# Patient Record
Sex: MALE | Race: OTHER | HISPANIC OR LATINO | URBAN - METROPOLITAN AREA
[De-identification: names, ages, dates, MRNs, and addresses within clinical notes are randomized per-mention and may not be internally consistent; named-entity substitution may affect disease eponyms.]

---

## 2024-04-16 ENCOUNTER — INPATIENT HOSPITAL (OUTPATIENT)
Dept: URBAN - METROPOLITAN AREA HOSPITAL 84 | Facility: HOSPITAL | Age: 34
End: 2024-04-16
Payer: MEDICAID

## 2024-04-16 ENCOUNTER — HOSPITAL ENCOUNTER (INPATIENT)
Facility: HOSPITAL | Age: 34
LOS: 6 days | Discharge: HOME | End: 2024-04-22
Attending: STUDENT IN AN ORGANIZED HEALTH CARE EDUCATION/TRAINING PROGRAM | Admitting: INTERNAL MEDICINE
Payer: MEDICAID

## 2024-04-16 ENCOUNTER — APPOINTMENT (OUTPATIENT)
Dept: GENERAL RADIOLOGY | Facility: HOSPITAL | Age: 34
End: 2024-04-16
Payer: MEDICAID

## 2024-04-16 ENCOUNTER — APPOINTMENT (OUTPATIENT)
Dept: CT IMAGING | Facility: HOSPITAL | Age: 34
End: 2024-04-16
Payer: MEDICAID

## 2024-04-16 DIAGNOSIS — F10.10 ETOH ABUSE: Primary | ICD-10-CM

## 2024-04-16 DIAGNOSIS — D72.829 ELEVATED WHITE BLOOD CELL COUNT, UNSPECIFIED: ICD-10-CM

## 2024-04-16 DIAGNOSIS — D53.9 NUTRITIONAL ANEMIA, UNSPECIFIED: ICD-10-CM

## 2024-04-16 DIAGNOSIS — R74.01 ELEVATION OF LEVELS OF LIVER TRANSAMINASE LEVELS: ICD-10-CM

## 2024-04-16 DIAGNOSIS — K72.00 ACUTE AND SUBACUTE HEPATIC FAILURE WITHOUT COMA: ICD-10-CM

## 2024-04-16 DIAGNOSIS — E87.8 OTHER DISORDERS OF ELECTROLYTE AND FLUID BALANCE, NOT ELSEWH: ICD-10-CM

## 2024-04-16 DIAGNOSIS — R17 UNSPECIFIED JAUNDICE: ICD-10-CM

## 2024-04-16 DIAGNOSIS — F10.10 ALCOHOL ABUSE, UNCOMPLICATED: ICD-10-CM

## 2024-04-16 LAB
ALBUMIN SERPL-MCNC: 2.6 G/DL (ref 3.5–5.2)
ALBUMIN SERPL-MCNC: 3.3 G/DL (ref 3.5–5.2)
ALBUMIN/GLOB SERPL: 0.6 G/DL
ALBUMIN/GLOB SERPL: 0.7 G/DL
ALP SERPL-CCNC: 190 U/L (ref 39–117)
ALP SERPL-CCNC: 268 U/L (ref 39–117)
ALPHA1 GLOB MFR UR ELPH: 252 MG/DL (ref 90–200)
ALT SERPL W P-5'-P-CCNC: 52 U/L (ref 1–41)
ALT SERPL W P-5'-P-CCNC: 65 U/L (ref 1–41)
AMMONIA BLD-SCNC: 92 UMOL/L (ref 16–60)
AMMONIA BLD-SCNC: 99 UMOL/L (ref 16–60)
ANION GAP SERPL CALCULATED.3IONS-SCNC: 15 MMOL/L (ref 5–15)
ANION GAP SERPL CALCULATED.3IONS-SCNC: 15 MMOL/L (ref 5–15)
AST SERPL-CCNC: 150 U/L (ref 1–40)
AST SERPL-CCNC: 203 U/L (ref 1–40)
BACTERIA UR QL AUTO: ABNORMAL /HPF
BASOPHILS # BLD AUTO: 0.04 10*3/MM3 (ref 0–0.2)
BASOPHILS # BLD AUTO: 0.08 10*3/MM3 (ref 0–0.2)
BASOPHILS NFR BLD AUTO: 0.2 % (ref 0–1.5)
BASOPHILS NFR BLD AUTO: 0.4 % (ref 0–1.5)
BILIRUB SERPL-MCNC: 25 MG/DL (ref 0–1.2)
BILIRUB SERPL-MCNC: 30.2 MG/DL (ref 0–1.2)
BILIRUB UR QL STRIP: ABNORMAL
BUN SERPL-MCNC: 3 MG/DL (ref 6–20)
BUN SERPL-MCNC: 3 MG/DL (ref 6–20)
BUN/CREAT SERPL: 6.1 (ref 7–25)
BUN/CREAT SERPL: 8.8 (ref 7–25)
CALCIUM SPEC-SCNC: 8 MG/DL (ref 8.6–10.5)
CALCIUM SPEC-SCNC: 9.1 MG/DL (ref 8.6–10.5)
CERULOPLASMIN SERPL-MCNC: 33 MG/DL (ref 16–31)
CHLORIDE SERPL-SCNC: 94 MMOL/L (ref 98–107)
CHLORIDE SERPL-SCNC: 99 MMOL/L (ref 98–107)
CLARITY UR: CLEAR
CO2 SERPL-SCNC: 20 MMOL/L (ref 22–29)
CO2 SERPL-SCNC: 22 MMOL/L (ref 22–29)
COLOR UR: ABNORMAL
CREAT SERPL-MCNC: 0.34 MG/DL (ref 0.76–1.27)
CREAT SERPL-MCNC: 0.49 MG/DL (ref 0.76–1.27)
DEPRECATED RDW RBC AUTO: 60.1 FL (ref 37–54)
DEPRECATED RDW RBC AUTO: 60.4 FL (ref 37–54)
EGFRCR SERPLBLD CKD-EPI 2021: 139.8 ML/MIN/1.73
EGFRCR SERPLBLD CKD-EPI 2021: 156.1 ML/MIN/1.73
EOSINOPHIL # BLD AUTO: 0.11 10*3/MM3 (ref 0–0.4)
EOSINOPHIL # BLD AUTO: 0.29 10*3/MM3 (ref 0–0.4)
EOSINOPHIL NFR BLD AUTO: 0.6 % (ref 0.3–6.2)
EOSINOPHIL NFR BLD AUTO: 1.3 % (ref 0.3–6.2)
ERYTHROCYTE [DISTWIDTH] IN BLOOD BY AUTOMATED COUNT: 16.9 % (ref 12.3–15.4)
ERYTHROCYTE [DISTWIDTH] IN BLOOD BY AUTOMATED COUNT: 17.1 % (ref 12.3–15.4)
FERRITIN SERPL-MCNC: 1801 NG/ML (ref 30–400)
FOLATE SERPL-MCNC: 12.4 NG/ML (ref 4.78–24.2)
GLOBULIN UR ELPH-MCNC: 4.5 GM/DL
GLOBULIN UR ELPH-MCNC: 4.9 GM/DL
GLUCOSE SERPL-MCNC: 109 MG/DL (ref 65–99)
GLUCOSE SERPL-MCNC: 185 MG/DL (ref 65–99)
GLUCOSE UR STRIP-MCNC: NEGATIVE MG/DL
HAV IGM SERPL QL IA: NORMAL
HAV IGM SERPL QL IA: NORMAL
HBV CORE IGM SERPL QL IA: NORMAL
HBV CORE IGM SERPL QL IA: NORMAL
HBV SURFACE AG SERPL QL IA: NORMAL
HBV SURFACE AG SERPL QL IA: NORMAL
HCT VFR BLD AUTO: 29.8 % (ref 37.5–51)
HCT VFR BLD AUTO: 32.3 % (ref 37.5–51)
HCV AB SER DONR QL: NORMAL
HCV AB SER DONR QL: NORMAL
HGB BLD-MCNC: 10.8 G/DL (ref 13–17.7)
HGB BLD-MCNC: 11.6 G/DL (ref 13–17.7)
HGB UR QL STRIP.AUTO: ABNORMAL
HYALINE CASTS UR QL AUTO: ABNORMAL /LPF
IMM GRANULOCYTES # BLD AUTO: 0.25 10*3/MM3 (ref 0–0.05)
IMM GRANULOCYTES # BLD AUTO: 0.29 10*3/MM3 (ref 0–0.05)
IMM GRANULOCYTES NFR BLD AUTO: 1.3 % (ref 0–0.5)
IMM GRANULOCYTES NFR BLD AUTO: 1.4 % (ref 0–0.5)
INR PPP: 1.62 (ref 0.93–1.1)
IRON 24H UR-MRATE: 94 MCG/DL (ref 59–158)
KETONES UR QL STRIP: NEGATIVE
LEUKOCYTE ESTERASE UR QL STRIP.AUTO: ABNORMAL
LYMPHOCYTES # BLD AUTO: 0.55 10*3/MM3 (ref 0.7–3.1)
LYMPHOCYTES # BLD AUTO: 1.8 10*3/MM3 (ref 0.7–3.1)
LYMPHOCYTES NFR BLD AUTO: 3 % (ref 19.6–45.3)
LYMPHOCYTES NFR BLD AUTO: 7.9 % (ref 19.6–45.3)
MAGNESIUM SERPL-MCNC: 2 MG/DL (ref 1.6–2.6)
MAGNESIUM SERPL-MCNC: 2 MG/DL (ref 1.6–2.6)
MCH RBC QN AUTO: 35.3 PG (ref 26.6–33)
MCH RBC QN AUTO: 35.5 PG (ref 26.6–33)
MCHC RBC AUTO-ENTMCNC: 35.9 G/DL (ref 31.5–35.7)
MCHC RBC AUTO-ENTMCNC: 36.2 G/DL (ref 31.5–35.7)
MCV RBC AUTO: 98 FL (ref 79–97)
MCV RBC AUTO: 98.2 FL (ref 79–97)
MONOCYTES # BLD AUTO: 0.8 10*3/MM3 (ref 0.1–0.9)
MONOCYTES # BLD AUTO: 1.61 10*3/MM3 (ref 0.1–0.9)
MONOCYTES NFR BLD AUTO: 4.3 % (ref 5–12)
MONOCYTES NFR BLD AUTO: 7.1 % (ref 5–12)
NEUTROPHILS NFR BLD AUTO: 16.71 10*3/MM3 (ref 1.7–7)
NEUTROPHILS NFR BLD AUTO: 18.59 10*3/MM3 (ref 1.7–7)
NEUTROPHILS NFR BLD AUTO: 82 % (ref 42.7–76)
NEUTROPHILS NFR BLD AUTO: 90.5 % (ref 42.7–76)
NITRITE UR QL STRIP: POSITIVE
NRBC BLD AUTO-RTO: 0.2 /100 WBC (ref 0–0.2)
NRBC BLD AUTO-RTO: 0.2 /100 WBC (ref 0–0.2)
PH UR STRIP.AUTO: 7.5 [PH] (ref 5–8)
PHOSPHATE SERPL-MCNC: 1.8 MG/DL (ref 2.5–4.5)
PHOSPHATE SERPL-MCNC: 2 MG/DL (ref 2.5–4.5)
PHOSPHATE SERPL-MCNC: 2.1 MG/DL (ref 2.5–4.5)
PLATELET # BLD AUTO: 211 10*3/MM3 (ref 140–450)
PLATELET # BLD AUTO: 225 10*3/MM3 (ref 140–450)
PMV BLD AUTO: 10.5 FL (ref 6–12)
PMV BLD AUTO: 10.8 FL (ref 6–12)
POTASSIUM SERPL-SCNC: 2.9 MMOL/L (ref 3.5–5.2)
POTASSIUM SERPL-SCNC: 3.1 MMOL/L (ref 3.5–5.2)
PROT SERPL-MCNC: 7.1 G/DL (ref 6–8.5)
PROT SERPL-MCNC: 8.2 G/DL (ref 6–8.5)
PROT UR QL STRIP: NEGATIVE
PROTHROMBIN TIME: 17 SECONDS (ref 9.6–11.7)
RBC # BLD AUTO: 3.04 10*6/MM3 (ref 4.14–5.8)
RBC # BLD AUTO: 3.29 10*6/MM3 (ref 4.14–5.8)
RBC # UR STRIP: ABNORMAL /HPF
REF LAB TEST METHOD: ABNORMAL
SODIUM SERPL-SCNC: 131 MMOL/L (ref 136–145)
SODIUM SERPL-SCNC: 134 MMOL/L (ref 136–145)
SP GR UR STRIP: 1.01 (ref 1–1.03)
SQUAMOUS #/AREA URNS HPF: ABNORMAL /HPF
UROBILINOGEN UR QL STRIP: ABNORMAL
VIT B12 BLD-MCNC: 968 PG/ML (ref 211–946)
WBC # UR STRIP: ABNORMAL /HPF
WBC NRBC COR # BLD AUTO: 18.46 10*3/MM3 (ref 3.4–10.8)
WBC NRBC COR # BLD AUTO: 22.66 10*3/MM3 (ref 3.4–10.8)

## 2024-04-16 PROCEDURE — 85025 COMPLETE CBC W/AUTO DIFF WBC: CPT | Performed by: NURSE PRACTITIONER

## 2024-04-16 PROCEDURE — 71046 X-RAY EXAM CHEST 2 VIEWS: CPT

## 2024-04-16 PROCEDURE — 99222 1ST HOSP IP/OBS MODERATE 55: CPT | Performed by: NURSE PRACTITIONER

## 2024-04-16 PROCEDURE — 86038 ANTINUCLEAR ANTIBODIES: CPT | Performed by: NURSE PRACTITIONER

## 2024-04-16 PROCEDURE — 85610 PROTHROMBIN TIME: CPT | Performed by: NURSE PRACTITIONER

## 2024-04-16 PROCEDURE — 82103 ALPHA-1-ANTITRYPSIN TOTAL: CPT | Performed by: NURSE PRACTITIONER

## 2024-04-16 PROCEDURE — 82607 VITAMIN B-12: CPT | Performed by: NURSE PRACTITIONER

## 2024-04-16 PROCEDURE — 25010000002 THIAMINE HCL 200 MG/2ML SOLUTION: Performed by: NURSE PRACTITIONER

## 2024-04-16 PROCEDURE — 83540 ASSAY OF IRON: CPT | Performed by: NURSE PRACTITIONER

## 2024-04-16 PROCEDURE — 25010000002 ACETYLCYSTEINE PER 100 MG: Performed by: NURSE PRACTITIONER

## 2024-04-16 PROCEDURE — 86015 ACTIN ANTIBODY EACH: CPT | Performed by: NURSE PRACTITIONER

## 2024-04-16 PROCEDURE — 83735 ASSAY OF MAGNESIUM: CPT | Performed by: NURSE PRACTITIONER

## 2024-04-16 PROCEDURE — 81001 URINALYSIS AUTO W/SCOPE: CPT | Performed by: NURSE PRACTITIONER

## 2024-04-16 PROCEDURE — 82746 ASSAY OF FOLIC ACID SERUM: CPT | Performed by: NURSE PRACTITIONER

## 2024-04-16 PROCEDURE — 86381 MITOCHONDRIAL ANTIBODY EACH: CPT | Performed by: NURSE PRACTITIONER

## 2024-04-16 PROCEDURE — 80053 COMPREHEN METABOLIC PANEL: CPT | Performed by: NURSE PRACTITIONER

## 2024-04-16 PROCEDURE — 63710000001 METHYLPREDNISOLONE PER 4 MG: Performed by: INTERNAL MEDICINE

## 2024-04-16 PROCEDURE — 82140 ASSAY OF AMMONIA: CPT | Performed by: NURSE PRACTITIONER

## 2024-04-16 PROCEDURE — 82728 ASSAY OF FERRITIN: CPT | Performed by: NURSE PRACTITIONER

## 2024-04-16 PROCEDURE — 0 DEXTROSE 5 % SOLUTION 250 ML FLEX CONT: Performed by: NURSE PRACTITIONER

## 2024-04-16 PROCEDURE — 86376 MICROSOMAL ANTIBODY EACH: CPT | Performed by: NURSE PRACTITIONER

## 2024-04-16 PROCEDURE — 82390 ASSAY OF CERULOPLASMIN: CPT | Performed by: NURSE PRACTITIONER

## 2024-04-16 PROCEDURE — 74176 CT ABD & PELVIS W/O CONTRAST: CPT

## 2024-04-16 PROCEDURE — 0 DEXTROSE 5 % SOLUTION 500 ML FLEX CONT: Performed by: NURSE PRACTITIONER

## 2024-04-16 PROCEDURE — 80074 ACUTE HEPATITIS PANEL: CPT | Performed by: NURSE PRACTITIONER

## 2024-04-16 PROCEDURE — 25810000003 SODIUM CHLORIDE 0.9 % SOLUTION: Performed by: NURSE PRACTITIONER

## 2024-04-16 PROCEDURE — 0 DEXTROSE 5 % SOLUTION 1,000 ML FLEX CONT: Performed by: NURSE PRACTITIONER

## 2024-04-16 PROCEDURE — 84100 ASSAY OF PHOSPHORUS: CPT | Performed by: INTERNAL MEDICINE

## 2024-04-16 PROCEDURE — 84100 ASSAY OF PHOSPHORUS: CPT | Performed by: NURSE PRACTITIONER

## 2024-04-16 PROCEDURE — 25010000002 CEFTRIAXONE PER 250 MG: Performed by: NURSE PRACTITIONER

## 2024-04-16 RX ORDER — POLYETHYLENE GLYCOL 3350 17 G/17G
17 POWDER, FOR SOLUTION ORAL DAILY PRN
Status: DISCONTINUED | OUTPATIENT
Start: 2024-04-16 | End: 2024-04-22 | Stop reason: HOSPADM

## 2024-04-16 RX ORDER — POTASSIUM CHLORIDE 20 MEQ/1
40 TABLET, EXTENDED RELEASE ORAL EVERY 4 HOURS
Qty: 6 TABLET | Refills: 0 | Status: COMPLETED | OUTPATIENT
Start: 2024-04-16 | End: 2024-04-17

## 2024-04-16 RX ORDER — SODIUM CHLORIDE 0.9 % (FLUSH) 0.9 %
10 SYRINGE (ML) INJECTION EVERY 12 HOURS SCHEDULED
Status: DISCONTINUED | OUTPATIENT
Start: 2024-04-16 | End: 2024-04-22 | Stop reason: HOSPADM

## 2024-04-16 RX ORDER — FENTANYL/ROPIVACAINE/NS/PF 2-625MCG/1
15 PLASTIC BAG, INJECTION (ML) EPIDURAL
Qty: 500 ML | Refills: 0 | Status: COMPLETED | OUTPATIENT
Start: 2024-04-16 | End: 2024-04-16

## 2024-04-16 RX ORDER — SODIUM CHLORIDE 9 MG/ML
40 INJECTION, SOLUTION INTRAVENOUS AS NEEDED
Status: DISCONTINUED | OUTPATIENT
Start: 2024-04-16 | End: 2024-04-22 | Stop reason: HOSPADM

## 2024-04-16 RX ORDER — POTASSIUM CHLORIDE 20 MEQ/1
40 TABLET, EXTENDED RELEASE ORAL ONCE
Status: COMPLETED | OUTPATIENT
Start: 2024-04-16 | End: 2024-04-16

## 2024-04-16 RX ORDER — BISACODYL 5 MG/1
5 TABLET, DELAYED RELEASE ORAL DAILY PRN
Status: DISCONTINUED | OUTPATIENT
Start: 2024-04-16 | End: 2024-04-22 | Stop reason: HOSPADM

## 2024-04-16 RX ORDER — ZINC SULFATE 50(220)MG
220 CAPSULE ORAL DAILY
Status: DISCONTINUED | OUTPATIENT
Start: 2024-04-16 | End: 2024-04-22 | Stop reason: HOSPADM

## 2024-04-16 RX ORDER — LORAZEPAM 2 MG/ML
2 INJECTION INTRAMUSCULAR
Status: ACTIVE | OUTPATIENT
Start: 2024-04-16 | End: 2024-04-21

## 2024-04-16 RX ORDER — AMOXICILLIN 250 MG
2 CAPSULE ORAL 2 TIMES DAILY PRN
Status: DISCONTINUED | OUTPATIENT
Start: 2024-04-16 | End: 2024-04-22 | Stop reason: HOSPADM

## 2024-04-16 RX ORDER — BISACODYL 10 MG
10 SUPPOSITORY, RECTAL RECTAL DAILY PRN
Status: DISCONTINUED | OUTPATIENT
Start: 2024-04-16 | End: 2024-04-22 | Stop reason: HOSPADM

## 2024-04-16 RX ORDER — ONDANSETRON 2 MG/ML
4 INJECTION INTRAMUSCULAR; INTRAVENOUS EVERY 6 HOURS PRN
Status: DISCONTINUED | OUTPATIENT
Start: 2024-04-16 | End: 2024-04-22 | Stop reason: HOSPADM

## 2024-04-16 RX ORDER — METHYLPREDNISOLONE 4 MG/1
40 TABLET ORAL
Status: DISCONTINUED | OUTPATIENT
Start: 2024-04-16 | End: 2024-04-18

## 2024-04-16 RX ORDER — DIPHENOXYLATE HYDROCHLORIDE AND ATROPINE SULFATE 2.5; .025 MG/1; MG/1
1 TABLET ORAL DAILY
Status: DISCONTINUED | OUTPATIENT
Start: 2024-04-16 | End: 2024-04-22 | Stop reason: HOSPADM

## 2024-04-16 RX ORDER — LORAZEPAM 1 MG/1
2 TABLET ORAL EVERY 6 HOURS
Qty: 8 TABLET | Refills: 0 | Status: COMPLETED | OUTPATIENT
Start: 2024-04-16 | End: 2024-04-16

## 2024-04-16 RX ORDER — THIAMINE HYDROCHLORIDE 100 MG/ML
200 INJECTION, SOLUTION INTRAMUSCULAR; INTRAVENOUS EVERY 8 HOURS SCHEDULED
Qty: 30 ML | Refills: 0 | Status: COMPLETED | OUTPATIENT
Start: 2024-04-16 | End: 2024-04-20

## 2024-04-16 RX ORDER — LACTULOSE 10 G/15ML
30 SOLUTION ORAL 3 TIMES DAILY
Status: DISCONTINUED | OUTPATIENT
Start: 2024-04-16 | End: 2024-04-17

## 2024-04-16 RX ORDER — LORAZEPAM 2 MG/ML
1 INJECTION INTRAMUSCULAR
Status: ACTIVE | OUTPATIENT
Start: 2024-04-16 | End: 2024-04-21

## 2024-04-16 RX ORDER — NITROGLYCERIN 0.4 MG/1
0.4 TABLET SUBLINGUAL
Status: DISCONTINUED | OUTPATIENT
Start: 2024-04-16 | End: 2024-04-22 | Stop reason: HOSPADM

## 2024-04-16 RX ORDER — LORAZEPAM 2 MG/ML
2 INJECTION INTRAMUSCULAR
Status: DISPENSED | OUTPATIENT
Start: 2024-04-16 | End: 2024-04-21

## 2024-04-16 RX ORDER — LACTULOSE 10 G/15ML
300 SOLUTION ORAL ONCE
Status: DISCONTINUED | OUTPATIENT
Start: 2024-04-16 | End: 2024-04-16

## 2024-04-16 RX ORDER — LORAZEPAM 1 MG/1
1 TABLET ORAL
Status: DISPENSED | OUTPATIENT
Start: 2024-04-16 | End: 2024-04-21

## 2024-04-16 RX ORDER — LORAZEPAM 1 MG/1
2 TABLET ORAL
Status: ACTIVE | OUTPATIENT
Start: 2024-04-16 | End: 2024-04-21

## 2024-04-16 RX ORDER — NICOTINE 21 MG/24HR
1 PATCH, TRANSDERMAL 24 HOURS TRANSDERMAL DAILY
Status: DISCONTINUED | OUTPATIENT
Start: 2024-04-16 | End: 2024-04-22 | Stop reason: HOSPADM

## 2024-04-16 RX ORDER — LORAZEPAM 1 MG/1
1 TABLET ORAL EVERY 6 HOURS
Qty: 4 TABLET | Refills: 0 | Status: DISPENSED | OUTPATIENT
Start: 2024-04-17 | End: 2024-04-18

## 2024-04-16 RX ORDER — FOLIC ACID 1 MG/1
1 TABLET ORAL DAILY
Status: DISCONTINUED | OUTPATIENT
Start: 2024-04-16 | End: 2024-04-22 | Stop reason: HOSPADM

## 2024-04-16 RX ORDER — SODIUM CHLORIDE 0.9 % (FLUSH) 0.9 %
10 SYRINGE (ML) INJECTION AS NEEDED
Status: DISCONTINUED | OUTPATIENT
Start: 2024-04-16 | End: 2024-04-22 | Stop reason: HOSPADM

## 2024-04-16 RX ADMIN — ACETYLCYSTEINE 11380 MG: 6 INJECTION, SOLUTION INTRAVENOUS at 08:44

## 2024-04-16 RX ADMIN — Medication 220 MG: at 10:08

## 2024-04-16 RX ADMIN — THIAMINE HYDROCHLORIDE 200 MG: 100 INJECTION, SOLUTION INTRAMUSCULAR; INTRAVENOUS at 14:31

## 2024-04-16 RX ADMIN — Medication 10 ML: at 21:24

## 2024-04-16 RX ADMIN — THERA TABS 1 TABLET: TAB at 10:08

## 2024-04-16 RX ADMIN — POTASSIUM CHLORIDE 40 MEQ: 1500 TABLET, EXTENDED RELEASE ORAL at 02:07

## 2024-04-16 RX ADMIN — THIAMINE HYDROCHLORIDE 200 MG: 100 INJECTION, SOLUTION INTRAMUSCULAR; INTRAVENOUS at 01:18

## 2024-04-16 RX ADMIN — POTASSIUM PHOSPHATE, MONOBASIC AND POTASSIUM PHOSPHATE, DIBASIC 15 MMOL: 224; 236 INJECTION, SOLUTION, CONCENTRATE INTRAVENOUS at 03:10

## 2024-04-16 RX ADMIN — POTASSIUM, SODIUM PHOSPHATES 280 MG-160 MG-250 MG ORAL POWDER PACKET 2 PACKET: POWDER IN PACKET at 15:42

## 2024-04-16 RX ADMIN — LORAZEPAM 2 MG: 1 TABLET ORAL at 21:23

## 2024-04-16 RX ADMIN — ACETYLCYSTEINE 6.25 MG/KG/HR: 200 INJECTION, SOLUTION INTRAVENOUS at 14:31

## 2024-04-16 RX ADMIN — RIFAXIMIN 550 MG: 550 TABLET ORAL at 21:23

## 2024-04-16 RX ADMIN — Medication 10 ML: at 01:19

## 2024-04-16 RX ADMIN — LACTULOSE 30 G: 20 SOLUTION ORAL at 02:34

## 2024-04-16 RX ADMIN — CEFTRIAXONE 2000 MG: 2 INJECTION, POWDER, FOR SOLUTION INTRAMUSCULAR; INTRAVENOUS at 02:34

## 2024-04-16 RX ADMIN — FOLIC ACID 1 MG: 1 TABLET ORAL at 08:10

## 2024-04-16 RX ADMIN — RIFAXIMIN 550 MG: 550 TABLET ORAL at 10:08

## 2024-04-16 RX ADMIN — Medication 10 ML: at 10:09

## 2024-04-16 RX ADMIN — Medication 1 PATCH: at 01:19

## 2024-04-16 RX ADMIN — THIAMINE HYDROCHLORIDE 200 MG: 100 INJECTION, SOLUTION INTRAMUSCULAR; INTRAVENOUS at 21:24

## 2024-04-16 RX ADMIN — DEXTROSE MONOHYDRATE 3800 MG: 50 INJECTION, SOLUTION INTRAVENOUS at 10:20

## 2024-04-16 RX ADMIN — POTASSIUM CHLORIDE 40 MEQ: 1500 TABLET, EXTENDED RELEASE ORAL at 21:23

## 2024-04-16 RX ADMIN — LACTULOSE 30 G: 20 SOLUTION ORAL at 15:42

## 2024-04-16 RX ADMIN — METHYLPREDNISOLONE 40 MG: 4 TABLET ORAL at 08:43

## 2024-04-16 RX ADMIN — LORAZEPAM 2 MG: 1 TABLET ORAL at 01:18

## 2024-04-16 RX ADMIN — LORAZEPAM 2 MG: 1 TABLET ORAL at 08:10

## 2024-04-16 RX ADMIN — POTASSIUM PHOSPHATE, MONOBASIC AND POTASSIUM PHOSPHATE, DIBASIC 15 MMOL: 224; 236 INJECTION, SOLUTION, CONCENTRATE INTRAVENOUS at 06:06

## 2024-04-16 RX ADMIN — LACTULOSE 30 G: 20 SOLUTION ORAL at 21:24

## 2024-04-16 RX ADMIN — LORAZEPAM 2 MG: 1 TABLET ORAL at 14:30

## 2024-04-16 RX ADMIN — RIFAXIMIN 550 MG: 550 TABLET ORAL at 02:34

## 2024-04-16 RX ADMIN — LACTULOSE 30 G: 20 SOLUTION ORAL at 08:44

## 2024-04-16 RX ADMIN — LORAZEPAM 1 MG: 1 TABLET ORAL at 22:43

## 2024-04-16 RX ADMIN — POTASSIUM CHLORIDE 40 MEQ: 1500 TABLET, EXTENDED RELEASE ORAL at 15:42

## 2024-04-16 NOTE — PLAN OF CARE
Goal Outcome Evaluation:      Pt resting in bed with no complaints of pain. Weakness in lower extremities noted by RN, walker provided as well as stand-by assistance when ambulating. Acetylcysteine currently infusing continuously, see MAR. Pt CIWA score throughout the day less than 2, scheduled Ativan given as ordered. Safety measures in place and call light within reach.

## 2024-04-16 NOTE — PROGRESS NOTES
[facility] MEDICINE SERVICE  DAILY PROGRESS NOTE    NAME: Duglas Candelaria  : 1990  MRN: [id number]      LOS: 0 days     PROVIDER OF SERVICE: Austen Montanez MD    Chief Complaint: Jaundice    Subjective:     Interval History: Patient feels well today, denies any significant withdrawal symptoms at this time.  He denies any nausea, vomiting or abdominal pain.    Review of Systems:   Review of Systems    Objective:     Vital Signs  Temp:  [97.8 °F (36.6 °C)-98.9 °F (37.2 °C)] 98.9 °F (37.2 °C)  Heart Rate:  [] 121  Resp:  [17-27] 27  BP: (115-132)/(66-73) 115/66   Body mass index is 27.01 kg/m².    Physical Exam  Physical Exam  General Appearance:  Alert, cooperative, no distress, appears stated age  Head:  Normocephalic, without obvious abnormality, atraumatic  Eyes:  PERRL, conjunctiva/corneas clear, EOM's intact, fundi benign, bilateral icteric sclera  Ears:  Normal TM's and external ear canals, both ears  Nose: Nares normal, septum midline, mucosa normal, no drainage or sinus tenderness  Throat: Lips, mucosa, and tongue normal; teeth and gums normal  Neck: Supple, symmetrical, trachea midline, no adenopathy, thyroid: not enlarged, symmetric, no tenderness/mass/nodules, no carotid bruit or JVD  Lungs:   Clear to auscultation bilaterally, respirations unlabored  Heart:  Regular rate and rhythm, S1, S2 normal, no murmur, rub or gallop  Abdomen:  Soft, non-tender, bowel sounds active all four quadrants,  no masses, no organomegaly  Extremities: Extremities normal, atraumatic, no cyanosis or edema  Pulses: 2+ and symmetric  Skin: Skin color, texture, turgor normal, no rashes or lesions, jaundiced  Neurologic: Normal      Scheduled Meds   acetylcysteine (ACETADOTE) 3,800 mg in dextrose (D5W) 5 % 500 mL infusion, 50 mg/kg, Intravenous, Once  cefTRIAXone, 2,000 mg, Intravenous, Q24H  folic acid, 1 mg, Oral, Daily  lactulose, 30 g, Oral, TID  LORazepam, 2 mg, Oral, Q6H   Followed by  [START ON 2024]  LORazepam, 1 mg, Oral, Q6H  methylPREDNISolone, 40 mg, Oral, Daily With Breakfast  multivitamin, 1 tablet, Oral, Daily  nicotine, 1 patch, Transdermal, Daily  rifAXIMin, 550 mg, Oral, Q12H  sodium chloride, 10 mL, Intravenous, Q12H  thiamine (B-1) IV, 200 mg, Intravenous, Q8H   Followed by  [START ON 4/21/2024] thiamine, 100 mg, Oral, Daily  zinc sulfate, 220 mg, Oral, Daily       PRN Meds     senna-docusate sodium **AND** polyethylene glycol **AND** bisacodyl **AND** bisacodyl    LORazepam **OR** LORazepam **OR** LORazepam **OR** LORazepam **OR** LORazepam **OR** LORazepam    Magnesium Standard Dose Replacement - Follow Nurse / BPA Driven Protocol    nitroglycerin    ondansetron    sodium chloride    sodium chloride   Infusions  acetylcysteine (ACETADOTE) 6000 mg in D5W infusion (NON-APAP LIVER FAILURE) Third Dose, 6.25 mg/kg/hr          Diagnostic Data    Results from last 7 days   Lab Units 04/16/24  1159   WBC 10*3/mm3 18.46*   HEMOGLOBIN g/dL 10.8*   HEMATOCRIT % 29.8*   PLATELETS 10*3/mm3 211   GLUCOSE mg/dL 185*   CREATININE mg/dL 0.34*   BUN mg/dL 3*   SODIUM mmol/L 134*   POTASSIUM mmol/L 3.1*   AST (SGOT) U/L 150*   ALT (SGPT) U/L 52*   ALK PHOS U/L 190*   BILIRUBIN mg/dL 25.0*   ANION GAP mmol/L 15.0       XR Chest PA & Lateral    Result Date: 4/16/2024  Impression: No acute cardiopulmonary findings. Electronically Signed: Barron Mcdaniel MD  4/16/2024 10:14 AM EDT  Workstation ID: [id number]    CT Abdomen Pelvis Without Contrast    Result Date: 4/16/2024  Impression: 1. Hepatosplenomegaly. 2. Mesenteric edema and small volume ascites likely related to portal hypertension. 3. Areas of colonic wall thickening and adjacent edema that might relate to portal hypertensive colopathy or possibly colitis. Correlate with symptoms. Electronically Signed: Almas Werner MD  4/16/2024 2:22 AM EDT  Workstation ID: [id number]       I reviewed the patient's new clinical results.    Assessment/Plan:     Active and Resolved  Problems  Active Hospital Problems    Diagnosis  POA    **Jaundice [R17]  Yes      Resolved Hospital Problems   No resolved problems to display.       Alcohol abuse with acute alcoholic hepatitis  -Patient has daily alcohol use for the last several years-presents with significant other bilirubin, slightly elevated AST and ALT consistent with alcoholic hepatitis  -Elevated INR consistent with coagulopathy related to acute liver dysfunction  -Maddrey discriminant function score of 57.8 suggests patient would benefit from steroid therapy; I have initiated oral methylprednisolone  -Check Lille score on day 7 to assess efficacy of steroid therapy  -Initiated on N-acetylcysteine protocol per GI  -CT of the abdomen/pelvis does not show any acute pathology, although there is evidence of hepatosplenomegaly with mesenteric edema and small ascites all consistent with liver dysfunction  -Encourage p.o. intake  -Initiated on CIWA protocol for possible alcohol withdrawal with addition of multivitamin, folic acid, zinc, thiamine  -Continue Xifaxan  -May need to initiate lactulose to titrate for 2-3 BM per day    Acute hypophosphatemia  -Replete    Acute hypokalemia  -Replete        DVT prophylaxis:  Mechanical DVT prophylaxis orders are present.         Code status is   Code Status and Medical Interventions:   Ordered at: 04/16/24 0046     Code Status (Patient has no pulse and is not breathing):    CPR (Attempt to Resuscitate)     Medical Interventions (Patient has pulse or is breathing):    Full Support       Plan for disposition: Anticipate patient will discharge home hopefully on 4/20    Time: 30 minutes    Signature: Electronically signed by Austen Montanez MD, 04/16/24, 13:35 EDT.  [facility] Hospitalist Team

## 2024-04-16 NOTE — CASE MANAGEMENT/SOCIAL WORK
Social Work Assessment   [place]     Patient Name: Duglas Candelaria  MRN: [id number]  Today's Date: 4/16/2024    Admit Date: 4/16/2024     Discharge Needs Assessment       Row Name 04/16/24 1519       Living Environment    People in Home alone    Current Living Arrangements home    Potentially Unsafe Housing Conditions none    In the past 12 months has the electric, gas, oil, or water company threatened to shut off services in your home? No    Primary Care Provided by self    Provides Primary Care For no one    Family Caregiver if Needed friend(s)    Quality of Family Relationships helpful;supportive    Able to Return to Prior Arrangements yes       Resource/Environmental Concerns    Resource/Environmental Concerns reliable transportation    Transportation Concerns no car       Transportation Needs    In the past 12 months, has lack of transportation kept you from medical appointments or from getting medications? no  Relies on friends for transport.    In the past 12 months, has lack of transportation kept you from meetings, work, or from getting things needed for daily living? No       Food Insecurity    Within the past 12 months, you worried that your food would run out before you got the money to buy more. Never true    Within the past 12 months, the food you bought just didn't last and you didn't have money to get more. Never true       Transition Planning    Patient/Family Anticipated Services at Transition none    Transportation Anticipated family or friend will provide  States a friend could transport, does not have Medicaid transport (PE coverage).       Discharge Needs Assessment    Readmission Within the Last 30 Days no previous admission in last 30 days    Equipment Currently Used at Home none    Concerns to be Addressed care coordination/care conferences;discharge planning;substance/tobacco abuse/use    Anticipated Changes Related to Illness none    Equipment Needed After Discharge none    Current Discharge  Risk substance use/abuse                   Discharge Plan       Row Name 04/16/24 1525       Plan    Plan Anticipate return home alone at d/c. [facility] PCP apt ([place]) to be made closer to d/c.    Patient/Family in Agreement with Plan yes    Plan Comments LSW met with patient for ETOH screen and completed discharge planning/SDOH while in room. Patient's primary language is Qatari, utilized  through [employer] (: [place] (?), ID: [id number]). See note details. Patient reports living at home alone and works as a . Patient relies on friends for transport or walks to work. Patient handles his own cooking, cleaning, hygiene, etc. Patient reports a friend may be able to transport at d/c. Agreeable to PCP apt closer in [place], [place]. [facility]acy[facility] ([place]), but agreeable to [facility]. Patient with PE coveage. DME: none. Will follow.              Demographic Summary       Row Name 04/16/24 1515       General Information    Admission Type inpatient    Arrived From hospital  Transfer from [facility]    Referral Source admission list    Reason for Consult care coordination/care conference;discharge planning;substance use concerns    Preferred Language English       Contact Information    Permission Granted to Share Info With                    Functional Status       Row Name 04/16/24 1516       Functional Status    Usual Activity Tolerance good    Current Activity Tolerance good       Functional Status, IADL    Medications independent    Meal Preparation independent    Housekeeping independent    Laundry independent    Shopping independent    IADL Comments Does not have a car, walks to work or catches a ride with friends.       Mental Status    General Appearance WDL WDL       Mental Status Summary    Recent Changes in Mental Status/Cognitive Functioning no changes       Employment/    Employment Status employed full-time    Shift Worked first shift    Current or  Previous Occupation service industry    Employment/ Comments Employed at [employer]                   Substance Abuse       Row Name 04/16/24 1518       Substance Use    Substance Use Status current alcohol use    Last Alcohol Use 04/15/24    Environment Typically Uses Alcohol private residence;bar/restaurant    Readiness to Change Alcohol Use contemplation    Attempts to Quit Alcohol none    Alcohol Withdrawal Pattern nausea;tremor(s);vomiting    Previous Substance Use Treatment none    Substance Use Comment Patient reports ETOH use of 10-12 beers per day. Informed he was told he needed to pursue cessation. Agreeable to stopping. Receptive to resources for ETOH treatment. Left IP and 1:1 mental health list. Patient to explore local AA options. Reports he was drinking for fun after work, because he enjoyed it. Denies any mental health history contributing.              04/16/24 1508   Living Situation   Current Living Arrangements home   Potentially Unsafe Housing Conditions none   Food Insecurity   Within the past 12 months, you worried that your food would run out before you got the money to buy more. Never true   Within the past 12 months, the food you bought just didn't last and you didn't have money to get more. Never true   Transportation Needs   In the past 12 months, has lack of transportation kept you from medical appointments or from getting medications? no   In the past 12 months, has lack of transportation kept you from meetings, work, or from getting things needed for daily living? No  (Does not have a car, employment is within walking distance. Friends assist with transport to other needs and for groceries.)   Utilities   In the past 12 months has the electric, gas, oil, or water company threatened to shut off services in your home? No   Abuse Screen (yes response referral indicated)   Feels Unsafe at Home or Work/School no   Feels Threatened by Someone no   Does Anyone Try to Keep  You From Having Contact with Others or Doing Things Outside Your Home? no   Physical Signs of Abuse Present no   Financial Resource Strain   How hard is it for you to pay for the very basics like food, housing, medical care, and heating? Not hard   Employment   Do you want help finding or keeping work or a job? I do not need or want help  (Employed as a  at a resturant.)   Family and Community Support   If for any reason you need help with day-to-day activities such as bathing, preparing meals, shopping, managing finances, etc., do you get the help you need? I don't need any help   How often do you feel lonely or isolated from those around you? Never   Education   Preferred Language English   Do you want help with school or training? For example, starting or completing job training or getting a high school diploma, GED or equivalent No   Physical Activity   On average, how many days per week do you engage in moderate to strenuous exercise (like a brisk walk)? 0 days  (Walks to work at times.)   On average, how many minutes do you engage in exercise at this level? 0 min   Number of minutes of exercise per week (!) 0   Alcohol Use   Q1: How often do you have a drink containing alcohol? 4 or more ti   Q2: How many drinks containing alcohol do you have on a typical day when you are drinking? 10 or more   Q3: How often do you have six or more drinks on one occasion? Daily   Stress   Do you feel stress - tense, restless, nervous, or anxious, or unable to sleep at night because your mind is troubled all the time - these days? Not at all   Mental Health   Little Interest or Pleasure in Doing Things 0-->not at all   Feeling Down, Depressed or Hopeless 0-->not at all   Disabilities   Concentrating, Remembering or Making Decisions Difficulty no   Doing Errands Independently Difficulty (such as shopping) yes   Errands Management Relies on friends for transport.     Sofiya Miranda, TERRANCEW, LSW, CCM    Phone:  [phone]  Cell: [phone]  Fax: [phone]  [email or web address]

## 2024-04-16 NOTE — PLAN OF CARE
Goal Outcome Evaluation:           Progress: no change  Outcome Evaluation: Pt is Mauritian SPEAKING. Direct admit from [facility]. Pt arrived with #22 to R Hand that was SL. #20 placed to LAC upon admission. Pt skin & sclera are jaundiced, prompting pt to seek tx. Pt reports having diarrhea previously but none today. ABD is distended & firm. Trace edema to legs & feet. Pt reports that he drinks 8-10 beers/day, pt reports last drink was the previous noc. Pt reports that he does not take any home medications, nor does he have a pharmacy. GI has been consulted per orders. K+, Phos replaced per orders. Pt is NPO except sips of water, ice chips, & PO medications. No C/O voiced per pt at this time. Pt resting abed with call light within reach. Plan of care ongoing.        Problem: Adult Inpatient Plan of Care  Goal: Plan of Care Review  Outcome: Ongoing, Progressing  Flowsheets (Taken 4/16/2024 0352)  Progress: no change  Outcome Evaluation: Pt is Maur[place] SPEAKING. Direct admit from [facility]. Pt arrived with #22 to R Hand that was SL. #20 placed to LAC upon admission. Pt skin & sclera are jaundiced, prompting pt to seek tx. Pt reports having diarrhea previously but none today. ABD is distended & firm. Trace edema to legs & feet. Pt reports that he drinks 8-10 beers/day, pt reports last drink was the previous noc. Pt reports that he does not take any home medications, nor does he have a pharmacy. GI has been consulted per orders. K+, Phos replaced per orders. Pt is NPO except sips of water, ice chips, & PO medications. No C/O voiced per pt at this time. Pt resting abed with call light within reach. Plan of care ongoing.  Goal: Patient-Specific Goal (Individualized)  Outcome: Ongoing, Progressing  Goal: Absence of Hospital-Acquired Illness or Injury  Outcome: Ongoing, Progressing  Intervention: Identify and Manage Fall Risk  Recent Flowsheet Documentation  Taken 4/16/2024 0200 by Nate Noble, CLINT  Safety  Promotion/Fall Prevention:   safety round/check completed   fall prevention program maintained  Taken 4/16/2024 0128 by Nate Noble RN  Safety Promotion/Fall Prevention:   safety round/check completed   nonskid shoes/slippers when out of bed   fall prevention program maintained   clutter free environment maintained   assistive device/personal items within reach  Intervention: Prevent Skin Injury  Recent Flowsheet Documentation  Taken 4/16/2024 0128 by Nate Noble RN  Body Position: position changed independently  Skin Protection:   adhesive use limited   tubing/devices free from skin contact  Intervention: Prevent and Manage VTE (Venous Thromboembolism) Risk  Recent Flowsheet Documentation  Taken 4/16/2024 0128 by Nate Noble RN  Activity Management: up ad mason  Intervention: Prevent Infection  Recent Flowsheet Documentation  Taken 4/16/2024 0200 by Nate Noble RN  Infection Prevention:   hand hygiene promoted   personal protective equipment utilized   rest/sleep promoted   single patient room provided  Taken 4/16/2024 0128 by Nate Noble RN  Infection Prevention:   hand hygiene promoted   personal protective equipment utilized   rest/sleep promoted   single patient room provided  Goal: Optimal Comfort and Wellbeing  Outcome: Ongoing, Progressing  Intervention: Monitor Pain and Promote Comfort  Recent Flowsheet Documentation  Taken 4/16/2024 0128 by Nate Noble RN  Pain Management Interventions:   care clustered   pillow support provided   position adjusted   quiet environment facilitated   relaxation techniques promoted   unnecessary movement minimized  Intervention: Provide Person-Centered Care  Recent Flowsheet Documentation  Taken 4/16/2024 0128 by Nate Noble RN  Trust Relationship/Rapport:   care explained   choices provided   questions answered   questions encouraged   reassurance provided   thoughts/feelings acknowledged  Goal: Readiness for Transition of Care  Outcome:  Ongoing, Progressing  Intervention: Mutually Develop Transition Plan  Recent Flowsheet Documentation  Taken 4/16/2024 0128 by Nate Noble RN  Equipment Currently Used at Home: none  Transportation Anticipated:   family or friend will provide   health plan transportation  Transportation Concerns: no car  Patient/Family Anticipated Services at Transition:      durable medical equipment   home health care  Patient/Family Anticipates Transition to: home     Problem: Alcohol Withdrawal  Goal: Alcohol Withdrawal Symptom Control  Outcome: Ongoing, Progressing  Intervention: Minimize or Manage Alcohol Withdrawal Symptoms  Recent Flowsheet Documentation  Taken 4/16/2024 0128 by Nate Noble RN  Sensory Stimulation Regulation:   care clustered   lighting decreased   quiet environment promoted     Problem: Acute Neurologic Deterioration (Alcohol Withdrawal)  Goal: Optimal Neurologic Function  Outcome: Ongoing, Progressing  Intervention: Minimize or Manage Acute Neurologic Symptoms  Recent Flowsheet Documentation  Taken 4/16/2024 0128 by Nate Noble RN  Sensory Stimulation Regulation:   care clustered   lighting decreased   quiet environment promoted  Airway/Ventilation Management:   pulmonary hygiene promoted   airway patency maintained  Cerebral Perfusion Promotion: blood pressure monitored     Problem: Substance Misuse (Alcohol Withdrawal)  Goal: Readiness for Change Identified  Outcome: Ongoing, Progressing  Intervention: Partner to Facilitate Behavior Change  Recent Flowsheet Documentation  Taken 4/16/2024 0128 by Nate Noble RN  Supportive Measures:   active listening utilized   verbalization of feelings encouraged     Problem: Adjustment to Illness (Liver Failure)  Goal: Optimal Coping with Liver Failure  Outcome: Ongoing, Progressing  Intervention: Support Response to Liver Disease  Recent Flowsheet Documentation  Taken 4/16/2024 0128 by Nate Noble RN  Supportive Measures:   active  listening utilized   verbalization of feelings encouraged  Diversional Activities: smartphone     Problem: Fluid and Electrolyte Imbalance (Liver Failure)  Goal: Fluid and Electrolyte Balance  Outcome: Ongoing, Progressing     Problem: Gastrointestinal Complications (Liver Failure)  Goal: Optimal Gastrointestinal Function  Outcome: Ongoing, Progressing  Intervention: Monitor and Support Gastrointestinal Function  Recent Flowsheet Documentation  Taken 4/16/2024 0128 by Nate Noble RN  Body Position: position changed independently     Problem: Impaired Coagulation (Liver Failure)  Goal: Optimal Coagulation Function  Outcome: Ongoing, Progressing     Problem: Infection (Liver Failure)  Goal: Absence of Infection Signs and Symptoms  Outcome: Ongoing, Progressing     Problem: Neurologic Function Impaired (Liver Failure)  Goal: Optimal Neurologic Function  Outcome: Ongoing, Progressing  Intervention: Monitor and Optimize Neurologic Status  Recent Flowsheet Documentation  Taken 4/16/2024 0128 by Nate Noble RN  Sensory Stimulation Regulation:   care clustered   lighting decreased   quiet environment promoted     Problem: Oral Intake Inadequate (Liver Failure)  Goal: Improved Oral Intake  Outcome: Ongoing, Progressing  Intervention: Promote and Optimize Nutrition  Recent Flowsheet Documentation  Taken 4/16/2024 0128 by Nate Noble RN  Environmental Support: calm environment promoted     Problem: Pain (Liver Failure)  Goal: Optimal Pain Control  Outcome: Ongoing, Progressing  Intervention: Prevent or Manage Pain  Recent Flowsheet Documentation  Taken 4/16/2024 0128 by Nate Noble RN  Pain Management Interventions:   care clustered   pillow support provided   position adjusted   quiet environment facilitated   relaxation techniques promoted   unnecessary movement minimized  Sleep/Rest Enhancement:   awakenings minimized   consistent schedule promoted   regular sleep/rest pattern promoted   relaxation  techniques promoted     Problem: Renal Dysfunction (Liver Failure)  Goal: Optimize Renal Function  Outcome: Ongoing, Progressing     Problem: Respiratory Compromise (Liver Failure)  Goal: Effective Oxygenation and Ventilation  Outcome: Ongoing, Progressing  Intervention: Optimize Oxygenation and Ventilation  Recent Flowsheet Documentation  Taken 4/16/2024 0128 by Nate Noble, RN  Head of Bed (HOB) Positioning: HOB elevated  Airway/Ventilation Management:   pulmonary hygiene promoted   airway patency maintained  Intervention: Promote Airway Secretion Clearance  Recent Flowsheet Documentation  Taken 4/16/2024 0128 by Nate Noble, RN  Activity Management: up ad mason  Breathing Techniques/Airway Clearance: deep/controlled cough encouraged  Cough And Deep Breathing: done independently per patient

## 2024-04-16 NOTE — H&P
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  2. Mesenteric edema and small volume ascites likely related to portal hypertension.  3. Areas of colonic wall thickening and adjacent edema that might relate to portal hypertensive colopathy or possibly colitis. Correlate with symptoms.  - GI consult    Electrolyte abnormality  Hypokalemia  Hyponatremia  - check a mag-2.o  - phos 1.8- will replace   - give 40meq oral potassium and repeat in 4hrs     Alcohol dependency  - CIWA protocol  - pt reports consumes 11-12 beers /day    Tobacco dependency  - nicotine patch       Code Status (Patient has no pulse and is not breathing): CPR (Attempt to Resuscitate)  Medical Interventions (Patient has pulse or is breathing): Full Support       Nutrition:   NPO Diet NPO Type: Sips with Meds, Ice Chips     DVT prophylaxis:  Mechanical DVT prophylaxis orders are present.         History of Present illness     Duglas Candelaria is a 34 y.o. male with no significant medial presented to the hospital as a transfer from [facility] and was admitted with a principal diagnosis of Jaundice.     Patient is [place]itanian-speaking and history has been obtained through tele-   Patient reports he presented for treatment because skin and eyes were yellow in color.  Denies any chest pain, shortness of breath, edema, nausea or vomiting or change in bowel habits.  Possibly some slight diarrhea.  Patient reports he is positive for tobacco  use and consumes approximately 11-12 beers a day that he is done over the past several years.  Over the past 5 to 6 weeks he had noticed a change in skin color and eye color.  Mild cough.  Patient reports that has had symptoms of withdrawal in the past    Patient arrived noted a disc was sent but no printed report of labs or imaging.  Was reported per charge nurse that facility had reported was attempting to get records from another outside facility where patient had labs drawn.  Get CT of abdomen pelvis and repeat labs.  Consult GI in place on [facility] protocol      Subjective / Review of systems     Review of Systems   Skin:  Positive for color change.   All other systems reviewed and are negative.       Past Medical/Surgical/Social/Family History & Allergies     History reviewed. No pertinent past medical history.   History reviewed. No pertinent surgical history.   Social History     Socioeconomic History    Marital status: Single   Tobacco Use    Smoking status: Every Day     Current packs/day: 0.25     Types: Cigarettes     Passive exposure: Current   Vaping Use    Vaping status: Unknown   Substance and Sexual Activity    Alcohol use: Yes    Drug use: Never    Sexual activity: Defer      History reviewed. No pertinent family history.   No Known Allergies   Social Determinants of Health     Tobacco Use: High Risk (4/16/2024)    Patient History     Smoking Tobacco Use: Every Day     Smokeless Tobacco Use: Unknown     Passive Exposure: Current   Alcohol Use: Alcohol Misuse (4/16/2024)    AUDIT-C     Frequency of Alcohol Consumption: 4 or more times a week     Average Number of Drinks: 10 or more     Frequency of Binge Drinking: Daily or almost daily   Financial Resource Strain: Not on file   Food Insecurity: Not on file   Transportation Needs: Not on file   Physical Activity: Not on file   Stress: Not on file   Social Connections: Unknown (4/15/2024)    Family and Community Support     Help with Day-to-Day  Activities: Not on file     Lonely or Isolated: Not on file   Interpersonal Safety: Not At Risk (4/16/2024)    Abuse Screen     Unsafe at Home or Work/School: no     Feels Threatened by Someone?: no     Does Anyone Keep You from Contacting Others or Doint Things Outside the Home?: no     Physical Sign of Abuse Present: no   Depression: Not on file   Housing Stability: Unknown (4/16/2024)    Housing Stability     Current Living Arrangements: home     Potentially Unsafe Housing Conditions: Not on file   Utilities: Not on file   Health Literacy: Unknown (4/15/2024)    Education     Help with school or training?: Not on file     Preferred Language: Not on file   Employment: Unknown (4/15/2024)    Employment     Do you want help finding or keeping work or a job?: Not on file   Disabilities: At Risk (4/16/2024)    Disabilities     Concentrating, Remembering, or Making Decisions Difficulty: no     Doing Errands Independently Difficulty: yes        Home Medications     Prior to Admission medications    Not on File        Objective / Physical Exam     Vital signs:  Pending     Admission Weight: pending     Physical Exam  Constitutional:       Appearance: Normal appearance.   Eyes:      General: Scleral icterus present.   Cardiovascular:      Rate and Rhythm: Normal rate and regular rhythm.   Pulmonary:      Effort: Pulmonary effort is normal.      Breath sounds: Normal breath sounds.   Abdominal:      General: There is distension.      Palpations: Abdomen is soft.   Musculoskeletal:         General: Normal range of motion.      Right lower leg: No edema.      Left lower leg: No edema.   Skin:     Coloration: Skin is jaundiced.   Neurological:      Mental Status: He is alert and oriented to person, place, and time.   Psychiatric:         Behavior: Behavior normal.            Labs     pending    Imaging     CT abdomen and pelvis pending     Current Medications     Scheduled Meds:  cefTRIAXone, 2,000 mg, Intravenous,  Q24H  folic acid, 1 mg, Oral, Daily  lactulose, 30 g, Oral, TID  LORazepam, 2 mg, Oral, Q6H   Followed by  [START ON 4/17/2024] LORazepam, 1 mg, Oral, Q6H  nicotine, 1 patch, Transdermal, Daily  potassium phosphate, 15 mmol, Intravenous, Q3H  rifAXIMin, 550 mg, Oral, Q8H  sodium chloride, 10 mL, Intravenous, Q12H  thiamine (B-1) IV, 200 mg, Intravenous, Q8H   Followed by  [START ON 4/21/2024] thiamine, 100 mg, Oral, Daily           [facility]  04/16/24   02:18 EDT

## 2024-04-16 NOTE — CONSULTS
GI CONSULT  NOTE:    Referring Provider:  Dr. Montanez    Chief complaint: Acute liver failure    Subjective . Worsening jaundice for 6 weeks    History of present illness: Duglas Candelaria is a 34 y.o. male who presents with changes of jaundice that have been mildly worsening over the last 6 weeks.  No history of similar complaints.  He reports 2 looser bowel movements per day but no melena or rectal bleeding.  No abdominal pain.  No nausea or vomiting.  No difficulty swallowing.  No known personal history of liver disease.  No NSAID, new medications or antibiotic use.  He drinks 10-12 standard beers per day.  He does report a history of getting shaky when he does not drink but reports no issues currently with last drink 2 days ago.  Nurse assisted with history taking with  saud as patient is Egyptian-speaking.    Endoscopic history:  None    Past Medical History:  History reviewed. No pertinent past medical history.    Past Surgical History:  History reviewed. No pertinent surgical history.    Social History:  Social History     Tobacco Use    Smoking status: Every Day     Current packs/day: 0.25     Types: Cigarettes     Passive exposure: Current   Vaping Use    Vaping status: Unknown   Substance Use Topics    Alcohol use: Yes    Drug use: Never   Drinks 10-12 beers per day    Family History:  No family history of liver disease    Medications:  No medications prior to admission.       Scheduled Meds:acetylcysteine (ACETADOTE) 3,800 mg in dextrose (D5W) 5 % 500 mL infusion, 50 mg/kg, Intravenous, Once  cefTRIAXone, 2,000 mg, Intravenous, Q24H  folic acid, 1 mg, Oral, Daily  lactulose, 30 g, Oral, TID  LORazepam, 2 mg, Oral, Q6H   Followed by  [START ON 4/17/2024] LORazepam, 1 mg, Oral, Q6H  methylPREDNISolone, 40 mg, Oral, Daily With Breakfast  multivitamin, 1 tablet, Oral, Daily  nicotine, 1 patch, Transdermal, Daily  rifAXIMin, 550 mg, Oral, Q12H  sodium chloride, 10 mL, Intravenous, Q12H  thiamine (B-1) IV,  "200 mg, Intravenous, Q8H   Followed by  [START ON 4/21/2024] thiamine, 100 mg, Oral, Daily  zinc sulfate, 220 mg, Oral, Daily      Continuous Infusions:acetylcysteine (ACETADOTE) 6000 mg in D5W infusion (NON-APAP LIVER FAILURE) Third Dose, 6.25 mg/kg/hr      PRN Meds:.  senna-docusate sodium **AND** polyethylene glycol **AND** bisacodyl **AND** bisacodyl    LORazepam **OR** LORazepam **OR** LORazepam **OR** LORazepam **OR** LORazepam **OR** LORazepam    Magnesium Standard Dose Replacement - Follow Nurse / BPA Driven Protocol    nitroglycerin    ondansetron    sodium chloride    sodium chloride    ALLERGIES:  Patient has no known allergies.    ROS:  The following systems were reviewed   Constitution:  No fevers, chills, no unintentional weight loss  Skin: no rash, no jaundice  Eyes:  No blurry vision, no eye pain  HENT:  No change in hearing or smell  Resp:  No dyspnea or cough  CV:  No chest pain or palpitations  :  No dysuria, hematuria  Musculoskeletal:  No leg cramps or arthralgias  Neuro:  No tremor, no numbness  Psych:  No depression or confusion    Objective resting in bed, no acute distress, no family present but nurse in room    Vital Signs:   Vitals:    04/16/24 0046 04/16/24 0513 04/16/24 0935   BP: 132/70 118/73 115/66   BP Location: Left arm Left arm Right arm   Patient Position: Lying Lying Lying   Pulse: 95 105 (!) 121   Resp: 17 21 27   Temp: 98.2 °F (36.8 °C) 97.8 °F (36.6 °C) 98.9 °F (37.2 °C)   TempSrc: Oral Oral Oral   SpO2: 100% 100% 99%   Weight: 75.9 kg (167 lb 5.3 oz) 75.9 kg (167 lb 5.3 oz)    Height: 167.6 cm (66\")         Physical Exam:       General Appearance:    Awake and alert, in no acute distress   Head:    Normocephalic, without obvious abnormality, atraumatic   Throat:   No oral lesions, no thrush, oral mucosa moist   Lungs:     Respirations regular, even and unlabored   Chest Wall:    No abnormalities observed   Abdomen:   Nondistended   Rectal:     Deferred   Extremities:   " "Moves all extremities, no edema, no cyanosis       Skin:   No rash, + jaundice, normal palpation       Neurologic:   Cranial nerves 2 - 12 grossly intact       Results Review:   I reviewed the patient's labs and imaging.  CBC    Results from last 7 days   Lab Units 04/16/24  0055   WBC 10*3/mm3 22.66*   HEMOGLOBIN g/dL 11.6*   PLATELETS 10*3/mm3 225     CMP   Results from last 7 days   Lab Units 04/16/24  0111 04/16/24  0055   SODIUM mmol/L  --  131*   POTASSIUM mmol/L  --  2.9*   CHLORIDE mmol/L  --  94*   CO2 mmol/L  --  22.0   BUN mg/dL  --  3*   CREATININE mg/dL  --  0.49*   GLUCOSE mg/dL  --  109*   ALBUMIN g/dL  --  3.3*   BILIRUBIN mg/dL  --  30.2*   ALK PHOS U/L  --  268*   AST (SGOT) U/L  --  203*   ALT (SGPT) U/L  --  65*   MAGNESIUM mg/dL  --  2.0   PHOSPHORUS mg/dL  --  1.8*   AMMONIA umol/L 92*  --      Cr Clearance Estimated Creatinine Clearance: 232.3 mL/min (A) (by C-G formula based on SCr of 0.49 mg/dL (L)).  Coag   Results from last 7 days   Lab Units 04/16/24  0055   INR  1.62*     HbA1C No results found for: \"HGBA1C\"  Blood Glucose No results found for: \"POCGLU\"  Infection     UA      Imaging Results (Last 72 Hours)       Procedure Component Value Units Date/Time    XR Chest PA & Lateral [[id number]] Collected: 04/16/24 1012     Updated: 04/16/24 1016    Narrative:      XR CHEST PA AND LATERAL    Date of Exam: 4/16/2024 10:03 AM EDT    Indication: leukocytosis    Comparison: None available.    Findings:  No focal pulmonary consolidations. Pulmonary vascularity appears within normal limits. No pneumothorax or pleural fluid identified. Heart size and mediastinal contour are within normal limits.      Impression:      Impression:  No acute cardiopulmonary findings.      Electronically Signed: Barron Mcdaniel MD    4/16/2024 10:14 AM EDT    Workstation ID: [id number]    CT Abdomen Pelvis Without Contrast [[id number]] Collected: 04/16/24 0217     Updated: 04/16/24 0224    Narrative:      CT ABDOMEN PELVIS WO " CONTRAST    Date of Exam: 4/16/2024 1:50 AM EDT    Indication: Liver failure.    Comparison: None available.    Technique: Axial CT images were obtained of the abdomen and pelvis without the administration of contrast. Sagittal and coronal reconstructions were performed.  Automated exposure control and iterative reconstruction methods were used.      Findings:  The lung bases are clear. Distal esophagus is unremarkable. Heart is normal size. No acute findings in the superficial soft tissues. No acute osseous abnormalities or destructive bone lesions. No significant osseous degenerative changes.    The liver is enlarged measuring up to 23 cm craniocaudal. The liver appears homogeneous. There is gallbladder wall thickening. The bile ducts, pancreas, stomach and adrenal glands appear within normal limits. The spleen is enlarged measuring up to 15.1   cm. Kidneys appear homogeneous. Ureters are nondistended. Urinary bladder is nondistended. The appendix is adjacent to the liver in the right upper quadrant and appears unremarkable. There is mild low-density proximal colon wall thickening and there is   thickening of the wall of the rectum and mid sigmoid colon with adjacent fat stranding. This could be related to colitis or portal hypertensive colopathy. Small bowel is nondistended. There is mild diffuse mesenteric edema. There is a small volume of   perihepatic ascites. No pneumoperitoneum or lymphadenopathy. Aorta is normal diameter.      Impression:      Impression:    1. Hepatosplenomegaly.  2. Mesenteric edema and small volume ascites likely related to portal hypertension.  3. Areas of colonic wall thickening and adjacent edema that might relate to portal hypertensive colopathy or possibly colitis. Correlate with symptoms.            Electronically Signed: Almas Werner MD    4/16/2024 2:22 AM EDT    Workstation ID: [id number]            ASSESSMENT:  Elevated LFTs/jaundice  Elevated ammonia  Mild macrocytic  anemia  Leukocytosis  Alcohol abuse  Electrolyte abnormalities    PLAN:  Patient is a 34-year-old male who presents with a 6-week history of jaundice in the setting of daily alcohol abuse.  Last drink of alcohol 2 days ago.    Total bilirubin 30.2, alk phos 268,  with ALT 65.  INR 1.62.  Maddrey discriminant function 57.8, suggesting need for prednisolone.  Primary already started prednisolone but significant leukocytosis with WBC 22.6.  We will check UA and chest x-ray to rule out infection.  CT abdomen/pelvis WO showed hepatosplenomegaly with mesenteric edema and small ascites with changes of portal hypertension.  No abdominal pain.  Would recommend Acetadote per protocol.  Good nutrition encouraged, add Ensure/snacks for nutritional support.    Monitor for alcohol withdrawal, on CIWA protocol.  Add multivitamin, folic acid, zinc and thiamine.  Elevated ammonia at 92, add Xifaxan twice daily.  He is currently having 2 bowel movements daily, monitor stool output and consider lactulose if needed.  Full liver serology workup ordered, acute hepatitis panel negative.    Magnesium 2.0, Phos 1.8, sodium 131 and potassium 2.9.  Electrolyte replacement per primary.  Hemoglobin 11.6 with MCV 98.2, check B12, folate and iron.  Supplementation as indicated.  Continue supportive care and we will follow.  Discussed with bedside RN this morning on rounds.    I discussed the patients findings and my recommendations with the patient.    We appreciate the referral    Electronically signed by GULSHAN Brantley, 04/16/24, 10:48 AM EDT.

## 2024-04-17 ENCOUNTER — INPATIENT HOSPITAL (OUTPATIENT)
Dept: URBAN - METROPOLITAN AREA HOSPITAL 84 | Facility: HOSPITAL | Age: 34
End: 2024-04-17
Payer: MEDICAID

## 2024-04-17 DIAGNOSIS — R94.5 ABNORMAL RESULTS OF LIVER FUNCTION STUDIES: ICD-10-CM

## 2024-04-17 LAB
ALBUMIN SERPL-MCNC: 2.5 G/DL (ref 3.5–5.2)
ALBUMIN/GLOB SERPL: 0.6 G/DL
ALP SERPL-CCNC: 188 U/L (ref 39–117)
ALT SERPL W P-5'-P-CCNC: 48 U/L (ref 1–41)
AMMONIA BLD-SCNC: 87 UMOL/L (ref 16–60)
ANA SER QL: NEGATIVE
ANION GAP SERPL CALCULATED.3IONS-SCNC: 11 MMOL/L (ref 5–15)
AST SERPL-CCNC: 111 U/L (ref 1–40)
BASOPHILS # BLD AUTO: 0.03 10*3/MM3 (ref 0–0.2)
BASOPHILS NFR BLD AUTO: 0.2 % (ref 0–1.5)
BILIRUB SERPL-MCNC: 24 MG/DL (ref 0–1.2)
BUN SERPL-MCNC: 5 MG/DL (ref 6–20)
BUN/CREAT SERPL: 16.1 (ref 7–25)
CALCIUM SPEC-SCNC: 8.2 MG/DL (ref 8.6–10.5)
CHLORIDE SERPL-SCNC: 99 MMOL/L (ref 98–107)
CO2 SERPL-SCNC: 22 MMOL/L (ref 22–29)
CREAT SERPL-MCNC: 0.31 MG/DL (ref 0.76–1.27)
DEPRECATED RDW RBC AUTO: 61.2 FL (ref 37–54)
EGFRCR SERPLBLD CKD-EPI 2021: 160.6 ML/MIN/1.73
EOSINOPHIL # BLD AUTO: 0 10*3/MM3 (ref 0–0.4)
EOSINOPHIL NFR BLD AUTO: 0 % (ref 0.3–6.2)
ERYTHROCYTE [DISTWIDTH] IN BLOOD BY AUTOMATED COUNT: 17.1 % (ref 12.3–15.4)
GLOBULIN UR ELPH-MCNC: 4.4 GM/DL
GLUCOSE SERPL-MCNC: 159 MG/DL (ref 65–99)
HCT VFR BLD AUTO: 28.5 % (ref 37.5–51)
HGB BLD-MCNC: 10.3 G/DL (ref 13–17.7)
IMM GRANULOCYTES # BLD AUTO: 0.25 10*3/MM3 (ref 0–0.05)
IMM GRANULOCYTES NFR BLD AUTO: 1.3 % (ref 0–0.5)
INR PPP: 1.96 (ref 0.93–1.1)
LKM-1 AB SER-ACNC: 1.1 UNITS (ref 0–20)
LYMPHOCYTES # BLD AUTO: 0.77 10*3/MM3 (ref 0.7–3.1)
LYMPHOCYTES NFR BLD AUTO: 4.1 % (ref 19.6–45.3)
MAGNESIUM SERPL-MCNC: 2 MG/DL (ref 1.6–2.6)
MCH RBC QN AUTO: 35.6 PG (ref 26.6–33)
MCHC RBC AUTO-ENTMCNC: 36.1 G/DL (ref 31.5–35.7)
MCV RBC AUTO: 98.6 FL (ref 79–97)
MITOCHONDRIA M2 IGG SER-ACNC: <20 UNITS (ref 0–20)
MONOCYTES # BLD AUTO: 0.7 10*3/MM3 (ref 0.1–0.9)
MONOCYTES NFR BLD AUTO: 3.7 % (ref 5–12)
NEUTROPHILS NFR BLD AUTO: 17.1 10*3/MM3 (ref 1.7–7)
NEUTROPHILS NFR BLD AUTO: 90.7 % (ref 42.7–76)
NRBC BLD AUTO-RTO: 0.3 /100 WBC (ref 0–0.2)
PHOSPHATE SERPL-MCNC: 1.8 MG/DL (ref 2.5–4.5)
PHOSPHATE SERPL-MCNC: 1.8 MG/DL (ref 2.5–4.5)
PLATELET # BLD AUTO: 179 10*3/MM3 (ref 140–450)
PMV BLD AUTO: 10.7 FL (ref 6–12)
POTASSIUM SERPL-SCNC: 3 MMOL/L (ref 3.5–5.2)
POTASSIUM SERPL-SCNC: 3.5 MMOL/L (ref 3.5–5.2)
POTASSIUM SERPL-SCNC: 4.5 MMOL/L (ref 3.5–5.2)
PROT SERPL-MCNC: 6.9 G/DL (ref 6–8.5)
PROTHROMBIN TIME: 20.3 SECONDS (ref 9.6–11.7)
RBC # BLD AUTO: 2.89 10*6/MM3 (ref 4.14–5.8)
SMA IGG SER-ACNC: 6 UNITS (ref 0–19)
SODIUM SERPL-SCNC: 132 MMOL/L (ref 136–145)
WBC NRBC COR # BLD AUTO: 18.85 10*3/MM3 (ref 3.4–10.8)

## 2024-04-17 PROCEDURE — 63710000001 METHYLPREDNISOLONE PER 4 MG: Performed by: INTERNAL MEDICINE

## 2024-04-17 PROCEDURE — 25010000002 PHYTONADIONE 10 MG/ML SOLUTION: Performed by: INTERNAL MEDICINE

## 2024-04-17 PROCEDURE — 80053 COMPREHEN METABOLIC PANEL: CPT | Performed by: NURSE PRACTITIONER

## 2024-04-17 PROCEDURE — 0 DEXTROSE 5 % SOLUTION 1,000 ML FLEX CONT: Performed by: NURSE PRACTITIONER

## 2024-04-17 PROCEDURE — 84100 ASSAY OF PHOSPHORUS: CPT | Performed by: INTERNAL MEDICINE

## 2024-04-17 PROCEDURE — 25010000002 ACETYLCYSTEINE PER 100 MG: Performed by: NURSE PRACTITIONER

## 2024-04-17 PROCEDURE — 25010000002 LORAZEPAM PER 2 MG: Performed by: NURSE PRACTITIONER

## 2024-04-17 PROCEDURE — 84132 ASSAY OF SERUM POTASSIUM: CPT | Performed by: INTERNAL MEDICINE

## 2024-04-17 PROCEDURE — 97161 PT EVAL LOW COMPLEX 20 MIN: CPT

## 2024-04-17 PROCEDURE — 25810000003 SODIUM CHLORIDE 0.9 % SOLUTION: Performed by: INTERNAL MEDICINE

## 2024-04-17 PROCEDURE — 85610 PROTHROMBIN TIME: CPT | Performed by: NURSE PRACTITIONER

## 2024-04-17 PROCEDURE — 85025 COMPLETE CBC W/AUTO DIFF WBC: CPT | Performed by: NURSE PRACTITIONER

## 2024-04-17 PROCEDURE — 99232 SBSQ HOSP IP/OBS MODERATE 35: CPT | Performed by: NURSE PRACTITIONER

## 2024-04-17 PROCEDURE — 25010000002 CEFTRIAXONE PER 250 MG: Performed by: NURSE PRACTITIONER

## 2024-04-17 PROCEDURE — 82140 ASSAY OF AMMONIA: CPT | Performed by: NURSE PRACTITIONER

## 2024-04-17 PROCEDURE — 25010000002 THIAMINE PER 100 MG: Performed by: NURSE PRACTITIONER

## 2024-04-17 PROCEDURE — 83735 ASSAY OF MAGNESIUM: CPT | Performed by: NURSE PRACTITIONER

## 2024-04-17 RX ORDER — LACTULOSE 10 G/15ML
10 SOLUTION ORAL DAILY
Status: DISCONTINUED | OUTPATIENT
Start: 2024-04-17 | End: 2024-04-22 | Stop reason: HOSPADM

## 2024-04-17 RX ORDER — FENTANYL/ROPIVACAINE/NS/PF 2-625MCG/1
15 PLASTIC BAG, INJECTION (ML) EPIDURAL ONCE
Status: COMPLETED | OUTPATIENT
Start: 2024-04-17 | End: 2024-04-17

## 2024-04-17 RX ORDER — FENTANYL/ROPIVACAINE/NS/PF 2-625MCG/1
15 PLASTIC BAG, INJECTION (ML) EPIDURAL ONCE
Qty: 250 ML | Refills: 0 | Status: COMPLETED | OUTPATIENT
Start: 2024-04-17 | End: 2024-04-17

## 2024-04-17 RX ORDER — POTASSIUM CHLORIDE 20 MEQ/1
20 TABLET, EXTENDED RELEASE ORAL ONCE
Status: COMPLETED | OUTPATIENT
Start: 2024-04-17 | End: 2024-04-17

## 2024-04-17 RX ORDER — POTASSIUM CHLORIDE 1.5 G/1.58G
40 POWDER, FOR SOLUTION ORAL EVERY 4 HOURS
Status: COMPLETED | OUTPATIENT
Start: 2024-04-17 | End: 2024-04-17

## 2024-04-17 RX ORDER — PHYTONADIONE 2 MG/ML
5 INJECTION, EMULSION INTRAMUSCULAR; INTRAVENOUS; SUBCUTANEOUS ONCE
Status: COMPLETED | OUTPATIENT
Start: 2024-04-17 | End: 2024-04-17

## 2024-04-17 RX ADMIN — LORAZEPAM 2 MG: 2 INJECTION INTRAMUSCULAR; INTRAVENOUS at 23:10

## 2024-04-17 RX ADMIN — RIFAXIMIN 550 MG: 550 TABLET ORAL at 21:30

## 2024-04-17 RX ADMIN — THIAMINE HYDROCHLORIDE 200 MG: 100 INJECTION, SOLUTION INTRAMUSCULAR; INTRAVENOUS at 13:32

## 2024-04-17 RX ADMIN — ACETYLCYSTEINE 6.25 MG/KG/HR: 200 INJECTION, SOLUTION INTRAVENOUS at 03:12

## 2024-04-17 RX ADMIN — POTASSIUM CHLORIDE 20 MEQ: 1500 TABLET, EXTENDED RELEASE ORAL at 05:58

## 2024-04-17 RX ADMIN — THIAMINE HYDROCHLORIDE 200 MG: 100 INJECTION, SOLUTION INTRAMUSCULAR; INTRAVENOUS at 21:30

## 2024-04-17 RX ADMIN — ACETYLCYSTEINE 6.25 MG/KG/HR: 200 INJECTION, SOLUTION INTRAVENOUS at 18:00

## 2024-04-17 RX ADMIN — POTASSIUM CHLORIDE 40 MEQ: 1500 TABLET, EXTENDED RELEASE ORAL at 01:55

## 2024-04-17 RX ADMIN — POTASSIUM PHOSPHATE, MONOBASIC AND POTASSIUM PHOSPHATE, DIBASIC 15 MMOL: 224; 236 INJECTION, SOLUTION, CONCENTRATE INTRAVENOUS at 03:12

## 2024-04-17 RX ADMIN — LACTULOSE 30 G: 20 SOLUTION ORAL at 09:09

## 2024-04-17 RX ADMIN — POTASSIUM CHLORIDE 40 MEQ: 1.5 POWDER, FOR SOLUTION ORAL at 16:54

## 2024-04-17 RX ADMIN — LORAZEPAM 1 MG: 1 TABLET ORAL at 09:09

## 2024-04-17 RX ADMIN — THIAMINE HYDROCHLORIDE 200 MG: 100 INJECTION, SOLUTION INTRAMUSCULAR; INTRAVENOUS at 05:58

## 2024-04-17 RX ADMIN — METHYLPREDNISOLONE 40 MG: 4 TABLET ORAL at 09:08

## 2024-04-17 RX ADMIN — Medication 10 ML: at 09:09

## 2024-04-17 RX ADMIN — POTASSIUM PHOSPHATE, MONOBASIC AND POTASSIUM PHOSPHATE, DIBASIC 15 MMOL: 224; 236 INJECTION, SOLUTION, CONCENTRATE INTRAVENOUS at 13:31

## 2024-04-17 RX ADMIN — CEFTRIAXONE 2000 MG: 2 INJECTION, POWDER, FOR SOLUTION INTRAMUSCULAR; INTRAVENOUS at 01:54

## 2024-04-17 RX ADMIN — Medication 10 ML: at 21:31

## 2024-04-17 RX ADMIN — POTASSIUM CHLORIDE 40 MEQ: 1.5 POWDER, FOR SOLUTION ORAL at 13:31

## 2024-04-17 RX ADMIN — RIFAXIMIN 550 MG: 550 TABLET ORAL at 09:09

## 2024-04-17 RX ADMIN — PHYTONADIONE 5 MG: 10 INJECTION, EMULSION INTRAMUSCULAR; INTRAVENOUS; SUBCUTANEOUS at 10:58

## 2024-04-17 RX ADMIN — LORAZEPAM 1 MG: 1 TABLET ORAL at 13:32

## 2024-04-17 RX ADMIN — Medication 220 MG: at 09:09

## 2024-04-17 RX ADMIN — FOLIC ACID 1 MG: 1 TABLET ORAL at 09:09

## 2024-04-17 RX ADMIN — LORAZEPAM 1 MG: 1 TABLET ORAL at 21:30

## 2024-04-17 RX ADMIN — POTASSIUM, SODIUM PHOSPHATES 280 MG-160 MG-250 MG ORAL POWDER PACKET 2 PACKET: POWDER IN PACKET at 22:42

## 2024-04-17 RX ADMIN — THERA TABS 1 TABLET: TAB at 09:09

## 2024-04-17 NOTE — PROGRESS NOTES
" LOS: 1 day   Patient Care Team:  Provider, No Known as PCP - General      Subjective \"Doing ok\"    Interval History:     Subjective: No abdominal pain.  No nausea or vomiting.  Patient reports he is eating a lot.    ROS:   No chest pain, shortness of breath, or cough.      Medication Review:     Current Facility-Administered Medications:     [COMPLETED] acetylcysteine (ACETADOTE) 11,380 mg in dextrose (D5W) 5 % 200 mL infusion, 150 mg/kg, Intravenous, Once, Last Rate: 256.9 mL/hr at 04/16/24 0844, 11,380 mg at 04/16/24 0844 **FOLLOWED BY** [COMPLETED] acetylcysteine (ACETADOTE) 3,800 mg in dextrose (D5W) 5 % 500 mL infusion, 50 mg/kg, Intravenous, Once, Last Rate: 125 mL/hr at 04/16/24 1020, 3,800 mg at 04/16/24 1020 **FOLLOWED BY** acetylcysteine (ACETADOTE) 6,000 mg in dextrose (D5W) 5 % 1,000 mL infusion, 6.25 mg/kg/hr, Intravenous, Continuous, Keturah Barrett APRN, Last Rate: 79.1 mL/hr at 04/17/24 0312, 6.25 mg/kg/hr at 04/17/24 0312    sennosides-docusate (PERICOLACE) 8.6-50 MG per tablet 2 tablet, 2 tablet, Oral, BID PRN **AND** polyethylene glycol (MIRALAX) packet 17 g, 17 g, Oral, Daily PRN **AND** bisacodyl (DULCOLAX) EC tablet 5 mg, 5 mg, Oral, Daily PRN **AND** bisacodyl (DULCOLAX) suppository 10 mg, 10 mg, Rectal, Daily PRN, Ashlee Pitt, APRN    cefTRIAXone (ROCEPHIN) 2,000 mg in sodium chloride 0.9 % 100 mL MBP, 2,000 mg, Intravenous, Q24H, Ashlee Pitt APRTENNILLE, Last Rate: 200 mL/hr at 04/17/24 0154, 2,000 mg at 04/17/24 0154    folic acid (FOLVITE) tablet 1 mg, 1 mg, Oral, Daily, Ashlee Pitt APRN, 1 mg at 04/17/24 0909    lactulose (CHRONULAC) 10 GM/15ML solution 30 g, 30 g, Oral, TID, Ashlee Pitt APRN, 30 g at 04/17/24 0909    LORazepam (ATIVAN) tablet 1 mg, 1 mg, Oral, Q1H PRN, 1 mg at 04/16/24 2248 **OR** LORazepam (ATIVAN) injection 1 mg, 1 mg, Intravenous, Q1H PRN **OR** LORazepam (ATIVAN) tablet 2 mg, 2 mg, Oral, Q1H PRN **OR** LORazepam (ATIVAN) injection 2 mg, 2 mg, " Intravenous, Q1H PRN **OR** LORazepam (ATIVAN) injection 2 mg, 2 mg, Intravenous, Q15 Min PRN **OR** LORazepam (ATIVAN) injection 2 mg, 2 mg, Intramuscular, Q15 Min PRN, Ashlee Pitt APRN    [COMPLETED] LORazepam (ATIVAN) tablet 2 mg, 2 mg, Oral, Q6H, 2 mg at 04/16/24 2123 **FOLLOWED BY** LORazepam (ATIVAN) tablet 1 mg, 1 mg, Oral, Q6H, Ashlee Pitt APRN, 1 mg at 04/17/24 0909    Magnesium Standard Dose Replacement - Follow Nurse / BPA Driven Protocol, , Does not apply, Yoadny ELEDR Vanessa, APRN    methylPREDNISolone (MEDROL) tablet 40 mg, 40 mg, Oral, Daily With Breakfast, Austen Montanez MD, 40 mg at 04/17/24 0908    multivitamin (THERAGRAN) tablet 1 tablet, 1 tablet, Oral, Daily, Keturah Barrett APRN, 1 tablet at 04/17/24 0909    nicotine (NICODERM CQ) 21 MG/24HR patch 1 patch, 1 patch, Transdermal, Daily, Ashlee Pitt APRN, 1 patch at 04/16/24 0119    nitroglycerin (NITROSTAT) SL tablet 0.4 mg, 0.4 mg, Sublingual, Q5 Min PRN, Ashlee Pitt APRN    ondansetron (ZOFRAN) injection 4 mg, 4 mg, Intravenous, Q6H PRN, Ashlee Pitt APRN    Phosphorus Replacement - Follow Nurse / BPA Driven Protocol, , Does not apply, Tarik ELDER Tushar, MD    Potassium Replacement - Follow Nurse / BPA Driven Protocol, , Does not apply, Tarik ELDER Tushar, MD    riFAXIMin (XIFAXAN) tablet 550 mg, 550 mg, Oral, Q12H, Keturah Barrett APRN, 550 mg at 04/17/24 0909    sodium chloride 0.9 % flush 10 mL, 10 mL, Intravenous, Q12H, Ashlee Pitt APRN, 10 mL at 04/17/24 0909    sodium chloride 0.9 % flush 10 mL, 10 mL, Intravenous, PRN, Ashlee Pitt, GULSHAN    sodium chloride 0.9 % infusion 40 mL, 40 mL, Intravenous, PRN, Ashlee Pitt APRN    thiamine (B-1) injection 200 mg, 200 mg, Intravenous, Q8H, 200 mg at 04/17/24 0558 **FOLLOWED BY** [START ON 4/21/2024] thiamine (VITAMIN B-1) tablet 100 mg, 100 mg, Oral, Daily, Ashlee Pitt APRN    vitamin K1 (PHYTONADIONE) 1 MG/1 ML oral solution 5 mg, 5 mg,  Oral, Once, Austen Montanez MD    zinc sulfate (ZINCATE) capsule 220 mg, 220 mg, Oral, Daily, BarrettKeturah, APRN, 220 mg at 04/17/24 0909      Objective resting in bed, no acute distress, no family present    Vital Signs  Vitals:    04/17/24 0048 04/17/24 0500 04/17/24 0529 04/17/24 0928   BP: 123/77  113/59 141/86   BP Location: Right arm  Left arm    Patient Position: Lying  Lying    Pulse: 99  98 113   Resp: 22  25 20   Temp: 97.8 °F (36.6 °C)  98.3 °F (36.8 °C) 98.2 °F (36.8 °C)   TempSrc: Oral  Oral Oral   SpO2: 97%  97%    Weight:  76.2 kg (167 lb 15.9 oz)     Height:           Physical Exam:     General Appearance:    Awake and alert, in no acute distress   Head:    Normocephalic, without obvious abnormality   Eyes:          Conjunctivae normal, icteric sclera   Throat:   No oral lesions, no thrush, oral mucosa moist   Neck:   No adenopathy, supple, no JVD   Lungs:     respirations regular, even and unlabored   Abdomen:   Nondistended   Rectal:     Deferred   Extremities:   No edema, no cyanosis   Skin:   No bruising or rash, + jaundice        Results Review:    CBC    Results from last 7 days   Lab Units 04/17/24  0309 04/16/24  1159 04/16/24  0055   WBC 10*3/mm3 18.85* 18.46* 22.66*   HEMOGLOBIN g/dL 10.3* 10.8* 11.6*   PLATELETS 10*3/mm3 179 211 225     CMP   Results from last 7 days   Lab Units 04/17/24  0309 04/16/24  2229 04/16/24  1159 04/16/24  0111 04/16/24  0055   SODIUM mmol/L 132*  --  134*  --  131*   POTASSIUM mmol/L 3.0*  --  3.1*  --  2.9*   CHLORIDE mmol/L 99  --  99  --  94*   CO2 mmol/L 22.0  --  20.0*  --  22.0   BUN mg/dL 5*  --  3*  --  3*   CREATININE mg/dL 0.31*  --  0.34*  --  0.49*   GLUCOSE mg/dL 159*  --  185*  --  109*   ALBUMIN g/dL 2.5*  --  2.6*  --  3.3*   BILIRUBIN mg/dL 24.0*  --  25.0*  --  30.2*   ALK PHOS U/L 188*  --  190*  --  268*   AST (SGOT) U/L 111*  --  150*  --  203*   ALT (SGPT) U/L 48*  --  52*  --  65*   MAGNESIUM mg/dL 2.0  --  2.0  --  2.0   PHOSPHORUS  "mg/dL  --  2.1* 2.0*  --  1.8*   AMMONIA umol/L 87*  --  99* 92*  --      Cr Clearance Estimated Creatinine Clearance: 368.7 mL/min (A) (by C-G formula based on SCr of 0.31 mg/dL (L)).  Coag   Results from last 7 days   Lab Units 04/17/24  0309 04/16/24  0055   INR  1.96* 1.62*     HbA1C No results found for: \"HGBA1C\"      Infection     UA    Results from last 7 days   Lab Units 04/16/24  1635   NITRITE UA  Positive*   WBC UA /HPF 0-2   BACTERIA UA /HPF None Seen   SQUAM EPITHEL UA /HPF 0-2     Microbiology Results (last 10 days)       ** No results found for the last 240 hours. **          Imaging Results (Last 72 Hours)       Procedure Component Value Units Date/Time    XR Chest PA & Lateral [[id number]] Collected: 04/16/24 1012     Updated: 04/16/24 1016    Narrative:      XR CHEST PA AND LATERAL    Date of Exam: 4/16/2024 10:03 AM EDT    Indication: leukocytosis    Comparison: None available.    Findings:  No focal pulmonary consolidations. Pulmonary vascularity appears within normal limits. No pneumothorax or pleural fluid identified. Heart size and mediastinal contour are within normal limits.      Impression:      Impression:  No acute cardiopulmonary findings.      Electronically Signed: Barron Mcdaniel MD    4/16/2024 10:14 AM EDT    Workstation ID: [id number]    CT Abdomen Pelvis Without Contrast [[id number]] Collected: 04/16/24 0217     Updated: 04/16/24 0224    Narrative:      CT ABDOMEN PELVIS WO CONTRAST    Date of Exam: 4/16/2024 1:50 AM EDT    Indication: Liver failure.    Comparison: None available.    Technique: Axial CT images were obtained of the abdomen and pelvis without the administration of contrast. Sagittal and coronal reconstructions were performed.  Automated exposure control and iterative reconstruction methods were used.      Findings:  The lung bases are clear. Distal esophagus is unremarkable. Heart is normal size. No acute findings in the superficial soft tissues. No acute osseous " abnormalities or destructive bone lesions. No significant osseous degenerative changes.    The liver is enlarged measuring up to 23 cm craniocaudal. The liver appears homogeneous. There is gallbladder wall thickening. The bile ducts, pancreas, stomach and adrenal glands appear within normal limits. The spleen is enlarged measuring up to 15.1   cm. Kidneys appear homogeneous. Ureters are nondistended. Urinary bladder is nondistended. The appendix is adjacent to the liver in the right upper quadrant and appears unremarkable. There is mild low-density proximal colon wall thickening and there is   thickening of the wall of the rectum and mid sigmoid colon with adjacent fat stranding. This could be related to colitis or portal hypertensive colopathy. Small bowel is nondistended. There is mild diffuse mesenteric edema. There is a small volume of   perihepatic ascites. No pneumoperitoneum or lymphadenopathy. Aorta is normal diameter.      Impression:      Impression:    1. Hepatosplenomegaly.  2. Mesenteric edema and small volume ascites likely related to portal hypertension.  3. Areas of colonic wall thickening and adjacent edema that might relate to portal hypertensive colopathy or possibly colitis. Correlate with symptoms.            Electronically Signed: Almas Werner MD    4/16/2024 2:22 AM EDT    Workstation ID: [id number]            Assessment & Plan   Elevated LFTs/jaundice  Elevated ammonia  Mild macrocytic anemia  Leukocytosis  Alcohol abuse  Electrolyte abnormalities     PLAN:  Patient is a 34-year-old male who presents with a 6-week history of jaundice in the setting of daily alcohol abuse.  Last drink of alcohol 2 days prior to admission.    Total bilirubin improved to 24 from 25.  Alk phos 188,  and ALT 48.  Maddrey discriminant function 57.8 on admission and primary started prednisolone.  Continue Acetadote per protocol.  Chest x-ray negative but UA concerning for UTI.  On ceftriaxone.  Leukocytosis  remains elevated at 18.85, no bands.  CT abdomen/pelvis WO showed hepatosplenomegaly with mesenteric edema and small ascites with changes of portal hypertension.  No abdominal pain.   Full liver serology workup in progress, acute hepatitis panel negative.    He is eating well including boost/Ensure.  Continue good nutritional support.  Elevated ammonia noted, continue Xifaxan twice daily.  We will add a single dose of lactulose daily, monitor stool output.  He reports 2 bowel movements daily at baseline.    Macrocytic anemia noted without report of overt GI bleeding.  B12 968, folate 12.4 and iron 94.     Electrolyte replacement per primary.  Sodium 132, potassium 3.0, magnesium 2.0 with Phos 2.1.  Continue supportive care we will follow.    Electronically signed by GULSHAN Brantley, 04/17/24, 10:29 AM EDT.

## 2024-04-17 NOTE — PLAN OF CARE
Goal Outcome Evaluation:  Plan of Care Reviewed With: patient        Progress: no change  Outcome Evaluation: Patient remains on CIWA protocol and seizure precautions. Patient continues on acetylcysteine gtt. Patient receiving potassium and phosphate replacement. On room air.  being used.

## 2024-04-17 NOTE — THERAPY EVALUATION
Patient Name: Duglas Candelaria  : 1990    MRN: [id number]                              Today's Date: 2024       Admit Date: 2024    Visit Dx: No diagnosis found.  Patient Active Problem List   Diagnosis    Jaundice     History reviewed. No pertinent past medical history.  History reviewed. No pertinent surgical history.   General Information       Row Name 24 1455          Physical Therapy Time and Intention    Document Type evaluation  -AO     Mode of Treatment physical therapy  -AO       Row Name 24 1455          General Information    Patient Profile Reviewed yes  -AO     Prior Level of Function --  Independent w/ mobility/ADLs, works as a , friends assist with transportation or patient walks to work  -AO     Existing Precautions/Restrictions seizures  CIWA protocol  -AO     Barriers to Rehab none identified  -AO       Row Name 24 145          Living Environment    People in Home alone  -AO       Row Name 24 145          Cognition    Orientation Status (Cognition) unable/difficult to assess  -AO       Row Name 24 1450          Safety Issues, Functional Mobility    Impairments Affecting Function (Mobility) balance;endurance/activity tolerance;strength  -AO               User Key  (r) = Recorded By, (t) = Taken By, (c) = Cosigned By      Initials Name Provider Type    AO Luzmaria Reid, PT Physical Therapist                   Mobility       Row Name 24 1453          Bed Mobility    Bed Mobility supine-sit  -AO     Supine-Sit [place] (Bed Mobility) supervision  -AO     Assistive Device (Bed Mobility) bed rails  -AO       Row Name 24 1453          Sit-Stand Transfer    Sit-Stand [place] (Transfers) contact guard  -AO     Assistive Device (Sit-Stand Transfers) walker, front-wheeled  -AO       Row Name 24 1459          Gait/Stairs (Locomotion)    [place] Level (Gait) minimum assist (75% patient effort)  -AO     Assistive Device  (Gait) walker, front-wheeled  -AO     Patient was able to Ambulate yes  -AO     Distance in Feet (Gait) 15  -AO     Comment, (Gait/Stairs) Tremulous gait, mild ataxia  -AO               User Key  (r) = Recorded By, (t) = Taken By, (c) = Cosigned By      Initials Name Provider Type    AO Luzmaria Reid, PT Physical Therapist                   Obj/Interventions       Row Name 04/17/24 1455          Range of Motion Comprehensive    General Range of Motion no range of motion deficits identified  -AO       Row Name 04/17/24 1455          Strength Comprehensive (MMT)    General Manual Muscle Testing (MMT) Assessment no strength deficits identified  -AO       Row Name 04/17/24 1455          Balance    Balance Assessment sitting static balance;sitting dynamic balance;sit to stand dynamic balance;standing static balance;standing dynamic balance  -AO     Static Sitting Balance independent  -AO     Dynamic Sitting Balance standby assist  -AO     Position, Sitting Balance unsupported;sitting edge of bed  -AO     Sit to Stand Dynamic Balance contact guard  -AO     Static Standing Balance contact guard  -AO     Dynamic Standing Balance minimal assist  -AO     Position/Device Used, Standing Balance supported;walker, rolling  -AO       Row Name 04/17/24 1454          Sensory Assessment (Somatosensory)    Sensory Assessment (Somatosensory) sensation intact  -AO               User Key  (r) = Recorded By, (t) = Taken By, (c) = Cosigned By      Initials Name Provider Type    AO Luzmaria Reid PT Physical Therapist                   Goals/Plan       Row Name 04/17/24 1457          Bed Mobility Goal 1 (PT)    Activity/Assistive Device (Bed Mobility Goal 1, PT) bed mobility activities, all  -AO     [place] Level/Cues Needed (Bed Mobility Goal 1, PT) independent  -AO     Time Frame (Bed Mobility Goal 1, PT) long term goal (LTG);2 weeks  -AO     Progress/Outcomes (Bed Mobility Goal 1, PT) goal not met  -AO       Row Name 04/17/24  1455          Transfer Goal 1 (PT)    Activity/Assistive Device (Transfer Goal 1, PT) transfers, all  -AO     [place] Level/Cues Needed (Transfer Goal 1, PT) independent  -AO     Time Frame (Transfer Goal 1, PT) long term goal (LTG);2 weeks  -AO     Progress/Outcome (Transfer Goal 1, PT) goal not met  -AO       Row Name 04/17/24 1455          Gait Training Goal 1 (PT)    Activity/Assistive Device (Gait Training Goal 1, PT) gait (walking locomotion)  -AO     [place] Level (Gait Training Goal 1, PT) independent  -AO     Distance (Gait Training Goal 1, PT) 400 ft  -AO     Time Frame (Gait Training Goal 1, PT) long term goal (LTG);2 weeks  -AO     Progress/Outcome (Gait Training Goal 1, PT) goal not met  -AO       Row Name 04/17/24 1455          Therapy Assessment/Plan (PT)    Planned Therapy Interventions (PT) balance training;bed mobility training;gait training;home exercise program;neuromuscular re-education;patient/family education;strengthening;transfer training  -AO               User Key  (r) = Recorded By, (t) = Taken By, (c) = Cosigned By      Initials Name Provider Type    AO Luzmaria Reid, PT Physical Therapist                   Clinical Impression       Row Name 04/17/24 1450          Pain    Pretreatment Pain Rating 0/10 - no pain  -AO     Posttreatment Pain Rating 0/10 - no pain  -AO       Row Name 04/17/24 1455          Plan of Care Review    Plan of Care Reviewed With patient  -AO     Progress no change  -AO     Outcome Evaluation Pt is a 33 y/o M who presented to [facility] w/ jaundice and trace BLE edema, currently on acetylcysteine protocol for liver failure, direct admit from [place] (drinks 10-12 beers/day). At baseline, pt Speaks Japanese (social hx/PLOF obtained through chart review) and lives alone, was working as a  and would either walk to work or receive transportation from friends. During eval, pt supine in bed, no tremors noted, required supervision for bed mobility, CGA for  transfers with RW with onset of tremors, and CGA for gait training 15 ft w/ RW with tremulous gait, mild ataxia, B foot slap, and disontinuous steps. Pt also with tachycardia with -122 bpm throughout session. At this time, pt not safe for home alone, recommending substance abuse rehab vs. SNF. Plan to see 3x/week at [facility] for progression of mobility. PPE: gloves  -AO       Row Name 04/17/24 1455          Therapy Assessment/Plan (PT)    Rehab Potential (PT) good, to achieve stated therapy goals  -AO     Criteria for Skilled Interventions Met (PT) yes;meets criteria;skilled treatment is necessary  -AO     Therapy Frequency (PT) 3 times/wk  -AO     Predicted Duration of Therapy Intervention (PT) until d/c  -AO       Row Name 04/17/24 1455          Vital Signs    Recovery Time Tachycardia w/ -122 bpm throughout session  -AO       Row Name 04/17/24 1455          Positioning and Restraints    Pre-Treatment Position in bed  -AO     Post Treatment Position chair  -AO     In Chair notified nsg;sitting;call light within reach;encouraged to call for assist;exit alarm on  -AO               User Key  (r) = Recorded By, (t) = Taken By, (c) = Cosigned By      Initials Name Provider Type    AO Luzmaria Reid, PT Physical Therapist                   Outcome Measures       Row Name 04/17/24 1455 04/17/24 0800       How much help from another person do you currently need...    Turning from your back to your side while in flat bed without using bedrails? 4  -AO 4  -HI    Moving from lying on back to sitting on the side of a flat bed without bedrails? 4  -AO 4  -HI    Moving to and from a bed to a chair (including a wheelchair)? 3  -AO 4  -HI    Standing up from a chair using your arms (e.g., wheelchair, bedside chair)? 3  -AO 4  -HI    Climbing 3-5 steps with a railing? 3  -AO 3  -HI    To walk in hospital room? 3  -AO 3  -HI    AM-PAC 6 Clicks Score (PT) 20  -AO 22  -HI    Highest Level of Mobility Goal 6 --> Walk 10 steps  or more  -AO 7 --> Walk 25 feet or more  -HI      Row Name 04/17/24 1453          Functional Assessment    Outcome Measure Options AM-PAC 6 Clicks Basic Mobility (PT)  -AO               User Key  (r) = Recorded By, (t) = Taken By, (c) = Cosigned By      Initials Name Provider Type    AO Luzmaria Reid, PT Physical Therapist    Shannan Oropeza, RN Registered Nurse                                 Physical Therapy Education       Title: PT OT SLP Therapies (Done)       Topic: Physical Therapy (Done)       Point: Mobility training (Done)       Learning Progress Summary             Patient Acceptance, E,TB, VU by AO at 4/17/2024 1541                         Point: Precautions (Done)       Learning Progress Summary             Patient Acceptance, E,TB, VU by AO at 4/17/2024 1541                                         User Key       Initials Effective Dates Name Provider Type Discipline    AO 06/16/21 -  Luzmaria Reid PT Physical Therapist PT                  PT Recommendation and Plan  Planned Therapy Interventions (PT): balance training, bed mobility training, gait training, home exercise program, neuromuscular re-education, patient/family education, strengthening, transfer training  Plan of Care Reviewed With: patient  Progress: no change  Outcome Evaluation: Pt is a 35 y/o M who presented to [facility] w/ jaundice and trace BLE edema, currently on acetylcysteine protocol for liver failure, direct admit from [place] (drinks 10-12 beers/day). At baseline, pt Speaks Uzbek (social hx/PLOF obtained through chart review) and lives alone, was working as a  and would either walk to work or receive transportation from friends. During eval, pt supine in bed, no tremors noted, required supervision for bed mobility, CGA for transfers with RW with onset of tremors, and CGA for gait training 15 ft w/ RW with tremulous gait, mild ataxia, B foot slap, and disontinuous steps. Pt also with tachycardia with -122 bpm  throughout session. At this time, pt not safe for home alone, recommending substance abuse rehab vs. SNF. Plan to see 3x/week at [facility] for progression of mobility. PPE: gloves     Time Calculation:   PT Evaluation Complexity  History, PT Evaluation Complexity: 1-2 personal factors and/or comorbidities  Examination of Body Systems (PT Eval Complexity): 1-2 elements  Clinical Presentation (PT Evaluation Complexity): stable  Clinical Decision Making (PT Evaluation Complexity): low complexity  Overall Complexity (PT Evaluation Complexity): low complexity     PT Charges       Row Name 04/17/24 1542             Time Calculation    Start Time 1455  -AO      Stop Time 1510  -AO      Time Calculation (min) 15 min  -AO      PT Received On 04/17/24  -AO      PT - Next Appointment 04/18/24  -AO      PT Goal Re-Cert Due Date 05/01/24  -AO         Time Calculation- PT    Total Timed Code Minutes- PT 0 minute(s)  -AO                User Key  (r) = Recorded By, (t) = Taken By, (c) = Cosigned By      Initials Name Provider Type    Luzmaria Handley, PT Physical Therapist                  Therapy Charges for Today       Code Description Service Date Service Provider Modifiers Qty    [id number]  PT EVAL LOW COMPLEXITY 3 4/17/2024 Luzmaria Reid, PT GP 1            PT G-Codes  Outcome Measure Options: AM-PAC 6 Clicks Basic Mobility (PT)  AM-PAC 6 Clicks Score (PT): 20  PT Discharge Summary  Anticipated Discharge Disposition (PT):  (Substance abuse rehab vs. SNF)    Luzmaria Reid, BRIANNA  4/17/2024

## 2024-04-17 NOTE — PAYOR COMM NOTE
"  Inpatient order 4/16/24    ------------------  AUTHORIZATION PENDING:   PLEASE FAX OR CALL DETERMINATION TO CONTACT BELOW:       THANK YOU,    HENNA Prado, RN  Utilization Review  [facility]  Phone: [phone]  Fax: [phone]      NPI: [id number]  Tax ID: [id number]            Duglas Candelaria (34 y.o. Male)       Date of Birth   1990    Social Security Number       Address   [address] [place] [address]    Home Phone   [phone]    MRN   [id number]       Orthodox   None    Marital Status   Single                            Admission Date   4/16/24    Admission Type   Urgent    Admitting Provider       Attending Provider   Austen Montanez MD    Department, Room/Bed   [facility] PROGRESS CARE, 2104/1       Discharge Date       Discharge Disposition       Discharge Destination                                 Attending Provider: Austen Montanez MD    Allergies: No Known Allergies    Isolation: None   Infection: None   Code Status: CPR    Ht: 167.6 cm (66\")   Wt: 76.2 kg (167 lb 15.9 oz)    Admission Cmt: None   Principal Problem: Jaundice [R17]                   Active Insurance as of 4/16/2024       Primary Coverage       Payor Plan Insurance Group Employer/Plan Group    [place]        Payor Plan Address Payor Plan Phone Number Payor Plan Fax Number Effective Dates    [address]   4/15/2024 - None Entered    [place] [id number]         Subscriber Name Subscriber Birth Date Member ID       DUGLAS CANDELARIA 1990 [id number]                     Emergency Contacts        (Rel.) Home Phone Work Phone Mobile Phone    BARRIE DIAZ (Friend) -- -- [phone]    CARMENZA SCALES (Friend) -- -- [phone]                 History & Physical        Ashlee iPtt APRN at 04/16/24 0046       Attestation signed by Husam Kingsley DO at 04/16/24 0430    I have reviewed this documentation and agree.      Electronically signed by " Husam Kingsley DO, 24, 4:30 AM EDT.                      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  2. Mesenteric edema and small volume ascites likely related to portal hypertension.  3. Areas of colonic wall thickening and adjacent edema that might relate to portal hypertensive colopathy or possibly colitis. Correlate with symptoms.  - GI consult    Electrolyte abnormality  Hypokalemia  Hyponatremia  - check a mag-2.o  - phos 1.8- will replace   - give 40meq oral potassium and repeat in 4hrs     Alcohol dependency  - CIWA protocol  - pt reports consumes 11-12 beers /day    Tobacco dependency  - nicotine patch       Code Status (Patient has no pulse and is not breathing): CPR (Attempt to Resuscitate)  Medical Interventions (Patient has pulse or is breathing): Full Support       Nutrition:   NPO Diet NPO Type: Sips with Meds, Ice Chips     DVT prophylaxis:  Mechanical DVT prophylaxis orders are present.         History of Present illness     Duglas Candelaria is a 34 y.o. male with no significant medial presented to the hospital as a transfer from [facility] and was admitted with a principal diagnosis of Jaundice.     Patient is Romanian-speaking and history has been obtained through tele-   Patient reports he presented for treatment because skin and eyes were yellow in color.  Denies any chest pain, shortness of breath, edema, nausea or vomiting or change in bowel habits.  Possibly some  slight diarrhea.  Patient reports he is positive for tobacco use and consumes approximately 11-12 beers a day that he is done over the past several years.  Over the past 5 to 6 weeks he had noticed a change in skin color and eye color.  Mild cough.  Patient reports that has had symptoms of withdrawal in the past    Patient arrived noted a disc was sent but no printed report of labs or imaging.  Was reported per charge nurse that facility had reported was attempting to get records from another outside facility where patient had labs drawn.  Get CT of abdomen pelvis and repeat labs.  Consult GI in place on CIWA protocol      Subjective / Review of systems     Review of Systems   Skin:  Positive for color change.   All other systems reviewed and are negative.       Past Medical/Surgical/Social/Family History & Allergies     History reviewed. No pertinent past medical history.   History reviewed. No pertinent surgical history.   Social History     Socioeconomic History    Marital status: Single   Tobacco Use    Smoking status: Every Day     Current packs/day: 0.25     Types: Cigarettes     Passive exposure: Current   Vaping Use    Vaping status: Unknown   Substance and Sexual Activity    Alcohol use: Yes    Drug use: Never    Sexual activity: Defer      History reviewed. No pertinent family history.   No Known Allergies   Social Determinants of Health     Tobacco Use: High Risk (4/16/2024)    Patient History     Smoking Tobacco Use: Every Day     Smokeless Tobacco Use: Unknown     Passive Exposure: Current   Alcohol Use: Alcohol Misuse (4/16/2024)    AUDIT-C     Frequency of Alcohol Consumption: 4 or more times a week     Average Number of Drinks: 10 or more     Frequency of Binge Drinking: Daily or almost daily   Financial Resource Strain: Not on file   Food Insecurity: Not on file   Transportation Needs: Not on file   Physical Activity: Not on file   Stress: Not on file   Social Connections: Unknown (4/15/2024)     Family and Community Support     Help with Day-to-Day Activities: Not on file     Lonely or Isolated: Not on file   Interpersonal Safety: Not At Risk (4/16/2024)    Abuse Screen     Unsafe at Home or Work/School: no     Feels Threatened by Someone?: no     Does Anyone Keep You from Contacting Others or Doint Things Outside the Home?: no     Physical Sign of Abuse Present: no   Depression: Not on file   Housing Stability: Unknown (4/16/2024)    Housing Stability     Current Living Arrangements: home     Potentially Unsafe Housing Conditions: Not on file   Utilities: Not on file   Health Literacy: Unknown (4/15/2024)    Education     Help with school or training?: Not on file     Preferred Language: Not on file   Employment: Unknown (4/15/2024)    Employment     Do you want help finding or keeping work or a job?: Not on file   Disabilities: At Risk (4/16/2024)    Disabilities     Concentrating, Remembering, or Making Decisions Difficulty: no     Doing Errands Independently Difficulty: yes        Home Medications     Prior to Admission medications    Not on File        Objective / Physical Exam     Vital signs:  Pending     Admission Weight: pending     Physical Exam  Constitutional:       Appearance: Normal appearance.   Eyes:      General: Scleral icterus present.   Cardiovascular:      Rate and Rhythm: Normal rate and regular rhythm.   Pulmonary:      Effort: Pulmonary effort is normal.      Breath sounds: Normal breath sounds.   Abdominal:      General: There is distension.      Palpations: Abdomen is soft.   Musculoskeletal:         General: Normal range of motion.      Right lower leg: No edema.      Left lower leg: No edema.   Skin:     Coloration: Skin is jaundiced.   Neurological:      Mental Status: He is alert and oriented to person, place, and time.   Psychiatric:         Behavior: Behavior normal.            Labs     pending    Imaging     CT abdomen and pelvis pending     Current Medications     Scheduled  Meds:  cefTRIAXone, 2,000 mg, Intravenous, Q24H  folic acid, 1 mg, Oral, Daily  lactulose, 30 g, Oral, TID  LORazepam, 2 mg, Oral, Q6H   Followed by  [START ON 4/17/2024] LORazepam, 1 mg, Oral, Q6H  nicotine, 1 patch, Transdermal, Daily  potassium phosphate, 15 mmol, Intravenous, Q3H  rifAXIMin, 550 mg, Oral, Q8H  sodium chloride, 10 mL, Intravenous, Q12H  thiamine (B-1) IV, 200 mg, Intravenous, Q8H   Followed by  [START ON 4/21/2024] thiamine, 100 mg, Oral, Daily           Ashlee Pitt APR[facility]  04/16/24   02:18 EDT      Electronically signed by Husam Kingsley DO at 04/16/24 0430       Emergency Department Notes    No notes of this type exist for this encounter.       Vital Signs (last day)       Date/Time Temp Temp src Pulse Resp BP Patient Position SpO2    04/17/24 0928 98.2 (36.8) Oral 113 20 141/86 -- --    04/17/24 0529 98.3 (36.8) Oral 98 25 113/59 Lying 97    04/17/24 0048 97.8 (36.6) Oral 99 22 123/77 Lying 97    04/16/24 2040 98.1 (36.7) Oral 95 24 115/74 Lying 95    04/16/24 1727 98.1 (36.7) Oral 110 25 126/83 Lying 99    04/16/24 1336 98.4 (36.9) Oral 105 21 128/82 Lying 100    04/16/24 0935 98.9 (37.2) Oral 121 27 115/66 Lying 99    04/16/24 0513 97.8 (36.6) Oral 105 21 118/73 Lying 100    04/16/24 0046 98.2 (36.8) Oral 95 17 132/70 Lying 100          Facility-Administered Medications as of 4/17/2024   Medication Dose Route Frequency Provider Last Rate Last Admin    [COMPLETED] acetylcysteine (ACETADOTE) 11,380 mg in dextrose (D5W) 5 % 200 mL infusion  150 mg/kg Intravenous Once Keturah Barrett APRN 256.9 mL/hr at 04/16/24 0844 11,380 mg at 04/16/24 0844    Followed by    [COMPLETED] acetylcysteine (ACETADOTE) 3,800 mg in dextrose (D5W) 5 % 500 mL infusion  50 mg/kg Intravenous Once Keturah Barrett APRN 125 mL/hr at 04/16/24 1020 3,800 mg at 04/16/24 1020    Followed by    acetylcysteine (ACETADOTE) 6,000 mg in dextrose (D5W) 5 % 1,000 mL infusion  6.25 mg/kg/hr Intravenous  Continuous Keturah Barrett APRN 79.1 mL/hr at 04/17/24 0312 6.25 mg/kg/hr at 04/17/24 0312    sennosides-docusate (PERICOLACE) 8.6-50 MG per tablet 2 tablet  2 tablet Oral BID PRN Pitt, Ashlee, APRN        And    polyethylene glycol (MIRALAX) packet 17 g  17 g Oral Daily PRN Pitt, Ashlee, APRN        And    bisacodyl (DULCOLAX) EC tablet 5 mg  5 mg Oral Daily PRN Pitt, Ashlee, APRN        And    bisacodyl (DULCOLAX) suppository 10 mg  10 mg Rectal Daily PRN Pitt, Ashlee, APRN        cefTRIAXone (ROCEPHIN) 2,000 mg in sodium chloride 0.9 % 100 mL MBP  2,000 mg Intravenous Q24H Pitt, Ashlee, APRN 200 mL/hr at 04/17/24 0154 2,000 mg at 04/17/24 0154    folic acid (FOLVITE) tablet 1 mg  1 mg Oral Daily Pitt, Ashlee, APRN   1 mg at 04/17/24 0909    lactulose (CHRONULAC) 10 GM/15ML solution 30 g  30 g Oral TID Pitt, Ashlee, APRN   30 g at 04/17/24 0909    LORazepam (ATIVAN) tablet 1 mg  1 mg Oral Q1H PRN Pitt, Ashlee, APRN   1 mg at 04/16/24 2243    Or    LORazepam (ATIVAN) injection 1 mg  1 mg Intravenous Q1H PRN Pitt, Ashlee, APRN        Or    LORazepam (ATIVAN) tablet 2 mg  2 mg Oral Q1H PRN Pitt, Ashlee, APRN        Or    LORazepam (ATIVAN) injection 2 mg  2 mg Intravenous Q1H PRN Pitt, Ashlee, APRN        Or    LORazepam (ATIVAN) injection 2 mg  2 mg Intravenous Q15 Min PRN Pitt, Ashlee, APRN        Or    LORazepam (ATIVAN) injection 2 mg  2 mg Intramuscular Q15 Min PRN Pitt, Ashlee, APRN        [COMPLETED] LORazepam (ATIVAN) tablet 2 mg  2 mg Oral Q6H Pitt, Ashlee, APRN   2 mg at 04/16/24 2123    Followed by    LORazepam (ATIVAN) tablet 1 mg  1 mg Oral Q6H Ashlee Pitt APRN   1 mg at 04/17/24 0909    Magnesium Standard Dose Replacement - Follow Nurse / BPA Driven Protocol   Does not apply PRN Ashlee Pitt APRN        methylPREDNISolone (MEDROL) tablet 40 mg  40 mg Oral Daily With Breakfast Austen Montanez MD   40 mg at 04/17/24 0908    multivitamin  (THERAGRAN) tablet 1 tablet  1 tablet Oral Daily Keturah Barrett APRN   1 tablet at 04/17/24 0909    nicotine (NICODERM CQ) 21 MG/24HR patch 1 patch  1 patch Transdermal Daily Ashlee Pitt APRN   1 patch at 04/16/24 0119    nitroglycerin (NITROSTAT) SL tablet 0.4 mg  0.4 mg Sublingual Q5 Min PRN Ashlee Pitt APRN        ondansetron (ZOFRAN) injection 4 mg  4 mg Intravenous Q6H PRN Ashlee Pitt APRN        Phosphorus Replacement - Follow Nurse / BPA Driven Protocol   Does not apply PRN Austen Montanez MD        [COMPLETED] potassium & sodium phosphates (PHOS-NAK) 280-160-250 MG packet 2 packet  2 packet Oral Once Austen Montanez MD   2 packet at 04/16/24 1542    [COMPLETED] potassium chloride (KLOR-CON M20) CR tablet 20 mEq  20 mEq Oral Once Ashlee Pitt APRN   20 mEq at 04/17/24 0558    [COMPLETED] potassium chloride (KLOR-CON M20) CR tablet 40 mEq  40 mEq Oral Once Ashlee Pitt APRN   40 mEq at 04/16/24 0207    [COMPLETED] potassium chloride (KLOR-CON M20) CR tablet 40 mEq  40 mEq Oral Q4H Austen Montanez MD   40 mEq at 04/17/24 0155    [COMPLETED] potassium phosphate 15 mmol in 0.9% normal saline 250 mL IVPB  15 mmol Intravenous Q3H Ashlee Pitt APRN   15 mmol at 04/16/24 0606    [COMPLETED] potassium phosphate 15 mmol in 0.9% normal saline 250 mL IVPB  15 mmol Intravenous Once Austen Montnaez MD   15 mmol at 04/17/24 0312    Potassium Replacement - Follow Nurse / BPA Driven Protocol   Does not apply PRN Austen Montanez MD        riFAXIMin (XIFAXAN) tablet 550 mg  550 mg Oral Q12H Keturah Barrett APRN   550 mg at 04/17/24 0909    sodium chloride 0.9 % flush 10 mL  10 mL Intravenous Q12H Ashlee Pitt APRN   10 mL at 04/17/24 0909    sodium chloride 0.9 % flush 10 mL  10 mL Intravenous PRN Ashlee Pitt, GULSHAN        sodium chloride 0.9 % infusion 40 mL  40 mL Intravenous PRN Ashlee Pitt APRN        thiamine (B-1) injection 200 mg  200 mg Intravenous Q8H Ashlee Pitt, APRN    200 mg at 04/17/24 0558    Followed by    [START ON 4/21/2024] thiamine (VITAMIN B-1) tablet 100 mg  100 mg Oral Daily Ashlee Pitt APRN        vitamin K1 (PHYTONADIONE) 1 MG/1 ML oral solution 5 mg  5 mg Oral Once Austen Montanez MD        zinc sulfate (ZINCATE) capsule 220 mg  220 mg Oral Daily Keturah Barrett APRN   220 mg at 04/17/24 0909     Lab Results (last 24 hours)       Procedure Component Value Units Date/Time    Comprehensive Metabolic Panel [[id number]]  (Abnormal) Collected: 04/17/24 0309    Specimen: Blood Updated: 04/17/24 0350     Glucose 159 mg/dL      BUN 5 mg/dL      Creatinine 0.31 mg/dL      Sodium 132 mmol/L      Potassium 3.0 mmol/L      Chloride 99 mmol/L      CO2 22.0 mmol/L      Calcium 8.2 mg/dL      Total Protein 6.9 g/dL      Albumin 2.5 g/dL      ALT (SGPT) 48 U/L      AST (SGOT) 111 U/L      Alkaline Phosphatase 188 U/L      Total Bilirubin 24.0 mg/dL      Globulin 4.4 gm/dL      A/G Ratio 0.6 g/dL      BUN/Creatinine Ratio 16.1     Anion Gap 11.0 mmol/L      eGFR 160.6 mL/min/1.73     Narrative:      GFR Normal >60  Chronic Kidney Disease <60  Kidney Failure <15      Magnesium [[id number]]  (Normal) Collected: 04/17/24 0309    Specimen: Blood Updated: 04/17/24 0350     Magnesium 2.0 mg/dL     Protime-INR [[id number]]  (Abnormal) Collected: 04/17/24 0309    Specimen: Blood Updated: 04/17/24 0339     Protime 20.3 Seconds      INR 1.96    Ammonia [[id number]]  (Abnormal) Collected: 04/17/24 0309    Specimen: Blood Updated: 04/17/24 0333     Ammonia 87 umol/L     CBC & Differential [[id number]]  (Abnormal) Collected: 04/17/24 0309    Specimen: Blood Updated: 04/17/24 0315    Narrative:      The following orders were created for panel order CBC & Differential.  Procedure                               Abnormality         Status                     ---------                               -----------         ------                     CBC Auto Differential[[id number]]        Abnormal             Final result                 Please view results for these tests on the individual orders.    CBC Auto Differential [[id number]]  (Abnormal) Collected: 04/17/24 0309    Specimen: Blood Updated: 04/17/24 0315     WBC 18.85 10*3/mm3      RBC 2.89 10*6/mm3      Hemoglobin 10.3 g/dL      Hematocrit 28.5 %      MCV 98.6 fL      MCH 35.6 pg      MCHC 36.1 g/dL      RDW 17.1 %      RDW-SD 61.2 fl      MPV 10.7 fL      Platelets 179 10*3/mm3      Neutrophil % 90.7 %      Lymphocyte % 4.1 %      Monocyte % 3.7 %      Eosinophil % 0.0 %      Basophil % 0.2 %      Immature Grans % 1.3 %      Neutrophils, Absolute 17.10 10*3/mm3      Lymphocytes, Absolute 0.77 10*3/mm3      Monocytes, Absolute 0.70 10*3/mm3      Eosinophils, Absolute 0.00 10*3/mm3      Basophils, Absolute 0.03 10*3/mm3      Immature Grans, Absolute 0.25 10*3/mm3      nRBC 0.3 /100 WBC     Phosphorus [[id number]]  (Abnormal) Collected: 04/16/24 2229    Specimen: Blood Updated: 04/16/24 2253     Phosphorus 2.1 mg/dL     Urinalysis With Microscopic If Indicated (No Culture) - Urine, Clean Catch [[id number]]  (Abnormal) Collected: 04/16/24 1635    Specimen: Urine, Clean Catch Updated: 04/16/24 1713     Color, UA [place]     Comment: Any Substance that causes an abnormal urine color can alter the accuracy of the chemical reactions.        Appearance, UA Clear     pH, UA 7.5     Specific Gravity, UA 1.014     Glucose, UA Negative     Ketones, UA Negative     Bilirubin, UA Large (3+)     Comment: Confirmation testing is unavailable.  A serum bilirubin is recommended for further assessment.        Blood, UA Trace     Protein, UA Negative     Leuk Esterase, UA Trace     Nitrite, UA Positive     Urobilinogen, UA 0.2 E.U./dL    Urinalysis, Microscopic Only - Urine, Clean Catch [[id number]]  (Abnormal) Collected: 04/16/24 1635    Specimen: Urine, Clean Catch Updated: 04/16/24 1713     RBC, UA 6-10 /HPF      WBC, UA 0-2 /HPF      Bacteria, UA None Seen /HPF      Squamous  Epithelial Cells, UA 0-2 /HPF      Hyaline Casts, UA 0-2 /LPF      Methodology Automated Microscopy    Vitamin B12 [[id number]]  (Abnormal) Collected: 04/16/24 1159    Specimen: Blood from Arm, Right Updated: 04/16/24 1634     Vitamin B-12 968 pg/mL     Narrative:      Results may be falsely increased if patient taking Biotin.      Folate [[id number]]  (Normal) Collected: 04/16/24 1159    Specimen: Blood from Arm, Right Updated: 04/16/24 1634     Folate 12.40 ng/mL     Narrative:      Results may be falsely increased if patient taking Biotin.      Ceruloplasmin [[id number]]  (Abnormal) Collected: 04/16/24 0055    Specimen: Blood from Arm, Left Updated: 04/16/24 1605     Ceruloplasmin 33 mg/dL     Alpha - 1 - Antitrypsin [[id number]]  (Abnormal) Collected: 04/16/24 0055    Specimen: Blood from Arm, Left Updated: 04/16/24 1605     ALPHA -1 ANTITRYPSIN 252 mg/dL     Comprehensive Metabolic Panel [[id number]]  (Abnormal) Collected: 04/16/24 1159    Specimen: Blood from Arm, Right Updated: 04/16/24 1239     Glucose 185 mg/dL      BUN 3 mg/dL      Creatinine 0.34 mg/dL      Sodium 134 mmol/L      Potassium 3.1 mmol/L      Chloride 99 mmol/L      CO2 20.0 mmol/L      Calcium 8.0 mg/dL      Total Protein 7.1 g/dL      Albumin 2.6 g/dL      ALT (SGPT) 52 U/L      Comment: Result checked          AST (SGOT) 150 U/L      Alkaline Phosphatase 190 U/L      Total Bilirubin 25.0 mg/dL      Globulin 4.5 gm/dL      A/G Ratio 0.6 g/dL      BUN/Creatinine Ratio 8.8     Anion Gap 15.0 mmol/L      eGFR 156.1 mL/min/1.73     Narrative:      GFR Normal >60  Chronic Kidney Disease <60  Kidney Failure <15      Magnesium [[id number]]  (Normal) Collected: 04/16/24 1159    Specimen: Blood from Arm, Right Updated: 04/16/24 1239     Magnesium 2.0 mg/dL     Phosphorus [[id number]]  (Abnormal) Collected: 04/16/24 1159    Specimen: Blood from Arm, Right Updated: 04/16/24 1225     Phosphorus 2.0 mg/dL     Ammonia [[id number]]  (Abnormal) Collected: 04/16/24  1159    Specimen: Blood from Arm, Right Updated: 04/16/24 1223     Ammonia 99 umol/L     CBC & Differential [[id number]]  (Abnormal) Collected: 04/16/24 1159    Specimen: Blood from Arm, Right Updated: 04/16/24 1206    Narrative:      The following orders were created for panel order CBC & Differential.  Procedure                               Abnormality         Status                     ---------                               -----------         ------                     CBC Auto Differential[[id number]]        Abnormal            Final result                 Please view results for these tests on the individual orders.    CBC Auto Differential [[id number]]  (Abnormal) Collected: 04/16/24 1159    Specimen: Blood from Arm, Right Updated: 04/16/24 1206     WBC 18.46 10*3/mm3      RBC 3.04 10*6/mm3      Hemoglobin 10.8 g/dL      Hematocrit 29.8 %      MCV 98.0 fL      MCH 35.5 pg      MCHC 36.2 g/dL      RDW 17.1 %      RDW-SD 60.1 fl      MPV 10.8 fL      Platelets 211 10*3/mm3      Neutrophil % 90.5 %      Lymphocyte % 3.0 %      Monocyte % 4.3 %      Eosinophil % 0.6 %      Basophil % 0.2 %      Immature Grans % 1.4 %      Neutrophils, Absolute 16.71 10*3/mm3      Lymphocytes, Absolute 0.55 10*3/mm3      Monocytes, Absolute 0.80 10*3/mm3      Eosinophils, Absolute 0.11 10*3/mm3      Basophils, Absolute 0.04 10*3/mm3      Immature Grans, Absolute 0.25 10*3/mm3      nRBC 0.2 /100 WBC           Imaging Results (Last 48 Hours)       Procedure Component Value Units Date/Time    XR Chest PA & Lateral [[id number]] Collected: 04/16/24 1012     Updated: 04/16/24 1016    Narrative:      XR CHEST PA AND LATERAL    Date of Exam: 4/16/2024 10:03 AM EDT    Indication: leukocytosis    Comparison: None available.    Findings:  No focal pulmonary consolidations. Pulmonary vascularity appears within normal limits. No pneumothorax or pleural fluid identified. Heart size and mediastinal contour are within normal limits.       Impression:      Impression:  No acute cardiopulmonary findings.      Electronically Signed: Barron Mcdaniel MD    4/16/2024 10:14 AM EDT    Workstation ID: [id number]    CT Abdomen Pelvis Without Contrast [[id number]] Collected: 04/16/24 0217     Updated: 04/16/24 0224    Narrative:      CT ABDOMEN PELVIS WO CONTRAST    Date of Exam: 4/16/2024 1:50 AM EDT    Indication: Liver failure.    Comparison: None available.    Technique: Axial CT images were obtained of the abdomen and pelvis without the administration of contrast. Sagittal and coronal reconstructions were performed.  Automated exposure control and iterative reconstruction methods were used.      Findings:  The lung bases are clear. Distal esophagus is unremarkable. Heart is normal size. No acute findings in the superficial soft tissues. No acute osseous abnormalities or destructive bone lesions. No significant osseous degenerative changes.    The liver is enlarged measuring up to 23 cm craniocaudal. The liver appears homogeneous. There is gallbladder wall thickening. The bile ducts, pancreas, stomach and adrenal glands appear within normal limits. The spleen is enlarged measuring up to 15.1   cm. Kidneys appear homogeneous. Ureters are nondistended. Urinary bladder is nondistended. The appendix is adjacent to the liver in the right upper quadrant and appears unremarkable. There is mild low-density proximal colon wall thickening and there is   thickening of the wall of the rectum and mid sigmoid colon with adjacent fat stranding. This could be related to colitis or portal hypertensive colopathy. Small bowel is nondistended. There is mild diffuse mesenteric edema. There is a small volume of   perihepatic ascites. No pneumoperitoneum or lymphadenopathy. Aorta is normal diameter.      Impression:      Impression:    1. Hepatosplenomegaly.  2. Mesenteric edema and small volume ascites likely related to portal hypertension.  3. Areas of colonic wall thickening and  adjacent edema that might relate to portal hypertensive colopathy or possibly colitis. Correlate with symptoms.            Electronically Signed: Almas Werner MD    2024 2:22 AM EDT    Workstation ID: [id number]             Physician Progress Notes (last 48 hours)        Austen Montanez MD at 24 0804              [facility] MEDICINE SERVICE  DAILY PROGRESS NOTE    NAME: Duglas Candelaria  : 1990  MRN: [id number]      LOS: 0 days     PROVIDER OF SERVICE: Austen Montaenz MD    Chief Complaint: Jaundice    Subjective:     Interval History: Patient feels well today, denies any significant withdrawal symptoms at this time.  He denies any nausea, vomiting or abdominal pain.    Review of Systems:   Review of Systems    Objective:     Vital Signs  Temp:  [97.8 °F (36.6 °C)-98.9 °F (37.2 °C)] 98.9 °F (37.2 °C)  Heart Rate:  [] 121  Resp:  [17-27] 27  BP: (115-132)/(66-73) 115/66   Body mass index is 27.01 kg/m².    Physical Exam  Physical Exam  General Appearance:  Alert, cooperative, no distress, appears stated age  Head:  Normocephalic, without obvious abnormality, atraumatic  Eyes:  PERRL, conjunctiva/corneas clear, EOM's intact, fundi benign, bilateral icteric sclera  Ears:  Normal TM's and external ear canals, both ears  Nose: Nares normal, septum midline, mucosa normal, no drainage or sinus tenderness  Throat: Lips, mucosa, and tongue normal; teeth and gums normal  Neck: Supple, symmetrical, trachea midline, no adenopathy, thyroid: not enlarged, symmetric, no tenderness/mass/nodules, no carotid bruit or JVD  Lungs:   Clear to auscultation bilaterally, respirations unlabored  Heart:  Regular rate and rhythm, S1, S2 normal, no murmur, rub or gallop  Abdomen:  Soft, non-tender, bowel sounds active all four quadrants,  no masses, no organomegaly  Extremities: Extremities normal, atraumatic, no cyanosis or edema  Pulses: 2+ and symmetric  Skin: Skin color, texture, turgor normal, no rashes or lesions,  jaundiced  Neurologic: Normal      Scheduled Meds   acetylcysteine (ACETADOTE) 3,800 mg in dextrose (D5W) 5 % 500 mL infusion, 50 mg/kg, Intravenous, Once  cefTRIAXone, 2,000 mg, Intravenous, Q24H  folic acid, 1 mg, Oral, Daily  lactulose, 30 g, Oral, TID  LORazepam, 2 mg, Oral, Q6H   Followed by  [START ON 4/17/2024] LORazepam, 1 mg, Oral, Q6H  methylPREDNISolone, 40 mg, Oral, Daily With Breakfast  multivitamin, 1 tablet, Oral, Daily  nicotine, 1 patch, Transdermal, Daily  rifAXIMin, 550 mg, Oral, Q12H  sodium chloride, 10 mL, Intravenous, Q12H  thiamine (B-1) IV, 200 mg, Intravenous, Q8H   Followed by  [START ON 4/21/2024] thiamine, 100 mg, Oral, Daily  zinc sulfate, 220 mg, Oral, Daily       PRN Meds     senna-docusate sodium **AND** polyethylene glycol **AND** bisacodyl **AND** bisacodyl    LORazepam **OR** LORazepam **OR** LORazepam **OR** LORazepam **OR** LORazepam **OR** LORazepam    Magnesium Standard Dose Replacement - Follow Nurse / BPA Driven Protocol    nitroglycerin    ondansetron    sodium chloride    sodium chloride   Infusions  acetylcysteine (ACETADOTE) 6000 mg in D5W infusion (NON-APAP LIVER FAILURE) Third Dose, 6.25 mg/kg/hr          Diagnostic Data    Results from last 7 days   Lab Units 04/16/24  1159   WBC 10*3/mm3 18.46*   HEMOGLOBIN g/dL 10.8*   HEMATOCRIT % 29.8*   PLATELETS 10*3/mm3 211   GLUCOSE mg/dL 185*   CREATININE mg/dL 0.34*   BUN mg/dL 3*   SODIUM mmol/L 134*   POTASSIUM mmol/L 3.1*   AST (SGOT) U/L 150*   ALT (SGPT) U/L 52*   ALK PHOS U/L 190*   BILIRUBIN mg/dL 25.0*   ANION GAP mmol/L 15.0       XR Chest PA & Lateral    Result Date: 4/16/2024  Impression: No acute cardiopulmonary findings. Electronically Signed: Barron Mcdaniel MD  4/16/2024 10:14 AM EDT  Workstation ID: [id number]    CT Abdomen Pelvis Without Contrast    Result Date: 4/16/2024  Impression: 1. Hepatosplenomegaly. 2. Mesenteric edema and small volume ascites likely related to portal hypertension. 3. Areas of colonic  wall thickening and adjacent edema that might relate to portal hypertensive colopathy or possibly colitis. Correlate with symptoms. Electronically Signed: Almas Werner MD  4/16/2024 2:22 AM EDT  Workstation ID: [id number]       I reviewed the patient's new clinical results.    Assessment/Plan:     Active and Resolved Problems  Active Hospital Problems    Diagnosis  POA    **Jaundice [R17]  Yes      Resolved Hospital Problems   No resolved problems to display.       Alcohol abuse with acute alcoholic hepatitis  -Patient has daily alcohol use for the last several years-presents with significant other bilirubin, slightly elevated AST and ALT consistent with alcoholic hepatitis  -Elevated INR consistent with coagulopathy related to acute liver dysfunction  -Maddrey discriminant function score of 57.8 suggests patient would benefit from steroid therapy; I have initiated oral methylprednisolone  -Check Lille score on day 7 to assess efficacy of steroid therapy  -Initiated on N-acetylcysteine protocol per GI  -CT of the abdomen/pelvis does not show any acute pathology, although there is evidence of hepatosplenomegaly with mesenteric edema and small ascites all consistent with liver dysfunction  -Encourage p.o. intake  -Initiated on CIWA protocol for possible alcohol withdrawal with addition of multivitamin, folic acid, zinc, thiamine  -Continue Xifaxan  -May need to initiate lactulose to titrate for 2-3 BM per day    Acute hypophosphatemia  -Replete    Acute hypokalemia  -Replete        DVT prophylaxis:  Mechanical DVT prophylaxis orders are present.         Code status is   Code Status and Medical Interventions:   Ordered at: 04/16/24 0046     Code Status (Patient has no pulse and is not breathing):    CPR (Attempt to Resuscitate)     Medical Interventions (Patient has pulse or is breathing):    Full Support       Plan for disposition: Anticipate patient will discharge home hopefully on 4/20    Time: 30  minutes    Signature: Electronically signed by Austen Montanez MD, 04/16/24, 13:35 EDT.  [facility]ist Team    Electronically signed by Austen Montanez MD at 04/16/24 1340          Consult Notes (last 48 hours)        Keturah Barrett APRN at 04/16/24 1047        Consult Orders    1. Inpatient Gastroenterology Consult [[id number]] ordered by Ashlee Pitt APRN at 04/16/24 0049              Attestation signed by Ronaldo Shen MD at 04/16/24 6743    I have reviewed this documentation and agree.  I have performed the physical and decision-making component of this encounter.  34-year-old male presents with changes of jaundice send been mildly worsening over the last 6 weeks.  No history of similar complaints  Physical exam  Abdomen is soft, nontender, nondistended  Eyes with scleral icterus and diffuse jaundice  Labs-hemoglobin 11.6, white blood cell count 22.6, bilirubin 30.2, alkaline phosphatase 262, , ALT 65, INR 1.62, ammonia 92  Imaging-CT scan shows hepatosplenomegaly small volume ascites likely related to portal hypertension  Assessment and plan  Elevated liver enzymes-likely alcoholic hepatitis-start prednisolone.  Will have to check urinalysis and chest x-ray first to ensure no underlying infection given his leukocytosis.  Start N-acetylcysteine protocol.  Encourage nutrition.  If bilirubin goes up tomorrow we will place a Dobbhoff and feed him enterically  Abnormal phosphorus, sodium, potassium-monitor for refeeding syndrome and replace aggressively  Liver disease-multivitamin folic acid zinc, thiamine  Elevated ammonia-no physical exam findings of encephalopathy.  Will check serological workup for underlying liver disease to ensure no other multifactorial reason for liver disease outside of alcohol                      GI CONSULT  NOTE:    Referring Provider:  Dr. Montanez    Chief complaint: Acute liver failure    Subjective . Worsening jaundice for 6 weeks    History of present illness:  Duglas Candelaria is a 34 y.o. male who presents with changes of jaundice that have been mildly worsening over the last 6 weeks.  No history of similar complaints.  He reports 2 looser bowel movements per day but no melena or rectal bleeding.  No abdominal pain.  No nausea or vomiting.  No difficulty swallowing.  No known personal history of liver disease.  No NSAID, new medications or antibiotic use.  He drinks 10-12 standard beers per day.  He does report a history of getting shaky when he does not drink but reports no issues currently with last drink 2 days ago.  Nurse assisted with history taking with  saud as patient is Togolese-speaking.    Endoscopic history:  None    Past Medical History:  History reviewed. No pertinent past medical history.    Past Surgical History:  History reviewed. No pertinent surgical history.    Social History:  Social History     Tobacco Use    Smoking status: Every Day     Current packs/day: 0.25     Types: Cigarettes     Passive exposure: Current   Vaping Use    Vaping status: Unknown   Substance Use Topics    Alcohol use: Yes    Drug use: Never   Drinks 10-12 beers per day    Family History:  No family history of liver disease    Medications:  No medications prior to admission.       Scheduled Meds:acetylcysteine (ACETADOTE) 3,800 mg in dextrose (D5W) 5 % 500 mL infusion, 50 mg/kg, Intravenous, Once  cefTRIAXone, 2,000 mg, Intravenous, Q24H  folic acid, 1 mg, Oral, Daily  lactulose, 30 g, Oral, TID  LORazepam, 2 mg, Oral, Q6H   Followed by  [START ON 4/17/2024] LORazepam, 1 mg, Oral, Q6H  methylPREDNISolone, 40 mg, Oral, Daily With Breakfast  multivitamin, 1 tablet, Oral, Daily  nicotine, 1 patch, Transdermal, Daily  rifAXIMin, 550 mg, Oral, Q12H  sodium chloride, 10 mL, Intravenous, Q12H  thiamine (B-1) IV, 200 mg, Intravenous, Q8H   Followed by  [START ON 4/21/2024] thiamine, 100 mg, Oral, Daily  zinc sulfate, 220 mg, Oral, Daily      Continuous Infusions:acetylcysteine  "(ACETADOTE) 6000 mg in D5W infusion (NON-APAP LIVER FAILURE) Third Dose, 6.25 mg/kg/hr      PRN Meds:.  senna-docusate sodium **AND** polyethylene glycol **AND** bisacodyl **AND** bisacodyl    LORazepam **OR** LORazepam **OR** LORazepam **OR** LORazepam **OR** LORazepam **OR** LORazepam    Magnesium Standard Dose Replacement - Follow Nurse / BPA Driven Protocol    nitroglycerin    ondansetron    sodium chloride    sodium chloride    ALLERGIES:  Patient has no known allergies.    ROS:  The following systems were reviewed   Constitution:  No fevers, chills, no unintentional weight loss  Skin: no rash, no jaundice  Eyes:  No blurry vision, no eye pain  HENT:  No change in hearing or smell  Resp:  No dyspnea or cough  CV:  No chest pain or palpitations  :  No dysuria, hematuria  Musculoskeletal:  No leg cramps or arthralgias  Neuro:  No tremor, no numbness  Psych:  No depression or confusion    Objective resting in bed, no acute distress, no family present but nurse in room    Vital Signs:   Vitals:    04/16/24 0046 04/16/24 0513 04/16/24 0935   BP: 132/70 118/73 115/66   BP Location: Left arm Left arm Right arm   Patient Position: Lying Lying Lying   Pulse: 95 105 (!) 121   Resp: 17 21 27   Temp: 98.2 °F (36.8 °C) 97.8 °F (36.6 °C) 98.9 °F (37.2 °C)   TempSrc: Oral Oral Oral   SpO2: 100% 100% 99%   Weight: 75.9 kg (167 lb 5.3 oz) 75.9 kg (167 lb 5.3 oz)    Height: 167.6 cm (66\")         Physical Exam:       General Appearance:    Awake and alert, in no acute distress   Head:    Normocephalic, without obvious abnormality, atraumatic   Throat:   No oral lesions, no thrush, oral mucosa moist   Lungs:     Respirations regular, even and unlabored   Chest Wall:    No abnormalities observed   Abdomen:   Nondistended   Rectal:     Deferred   Extremities:   Moves all extremities, no edema, no cyanosis       Skin:   No rash, + jaundice, normal palpation       Neurologic:   Cranial nerves 2 - 12 grossly intact       Results " "Review:   I reviewed the patient's labs and imaging.  CBC    Results from last 7 days   Lab Units 04/16/24  0055   WBC 10*3/mm3 22.66*   HEMOGLOBIN g/dL 11.6*   PLATELETS 10*3/mm3 225     CMP   Results from last 7 days   Lab Units 04/16/24  0111 04/16/24  0055   SODIUM mmol/L  --  131*   POTASSIUM mmol/L  --  2.9*   CHLORIDE mmol/L  --  94*   CO2 mmol/L  --  22.0   BUN mg/dL  --  3*   CREATININE mg/dL  --  0.49*   GLUCOSE mg/dL  --  109*   ALBUMIN g/dL  --  3.3*   BILIRUBIN mg/dL  --  30.2*   ALK PHOS U/L  --  268*   AST (SGOT) U/L  --  203*   ALT (SGPT) U/L  --  65*   MAGNESIUM mg/dL  --  2.0   PHOSPHORUS mg/dL  --  1.8*   AMMONIA umol/L 92*  --      Cr Clearance Estimated Creatinine Clearance: 232.3 mL/min (A) (by C-G formula based on SCr of 0.49 mg/dL (L)).  Coag   Results from last 7 days   Lab Units 04/16/24  0055   INR  1.62*     HbA1C No results found for: \"HGBA1C\"  Blood Glucose No results found for: \"POCGLU\"  Infection     UA      Imaging Results (Last 72 Hours)       Procedure Component Value Units Date/Time    XR Chest PA & Lateral [[id number]] Collected: 04/16/24 1012     Updated: 04/16/24 1016    Narrative:      XR CHEST PA AND LATERAL    Date of Exam: 4/16/2024 10:03 AM EDT    Indication: leukocytosis    Comparison: None available.    Findings:  No focal pulmonary consolidations. Pulmonary vascularity appears within normal limits. No pneumothorax or pleural fluid identified. Heart size and mediastinal contour are within normal limits.      Impression:      Impression:  No acute cardiopulmonary findings.      Electronically Signed: Barron Mcdaniel MD    4/16/2024 10:14 AM EDT    Workstation ID: [id number]    CT Abdomen Pelvis Without Contrast [[id number]] Collected: 04/16/24 0217     Updated: 04/16/24 0224    Narrative:      CT ABDOMEN PELVIS WO CONTRAST    Date of Exam: 4/16/2024 1:50 AM EDT    Indication: Liver failure.    Comparison: None available.    Technique: Axial CT images were obtained of the abdomen " and pelvis without the administration of contrast. Sagittal and coronal reconstructions were performed.  Automated exposure control and iterative reconstruction methods were used.      Findings:  The lung bases are clear. Distal esophagus is unremarkable. Heart is normal size. No acute findings in the superficial soft tissues. No acute osseous abnormalities or destructive bone lesions. No significant osseous degenerative changes.    The liver is enlarged measuring up to 23 cm craniocaudal. The liver appears homogeneous. There is gallbladder wall thickening. The bile ducts, pancreas, stomach and adrenal glands appear within normal limits. The spleen is enlarged measuring up to 15.1   cm. Kidneys appear homogeneous. Ureters are nondistended. Urinary bladder is nondistended. The appendix is adjacent to the liver in the right upper quadrant and appears unremarkable. There is mild low-density proximal colon wall thickening and there is   thickening of the wall of the rectum and mid sigmoid colon with adjacent fat stranding. This could be related to colitis or portal hypertensive colopathy. Small bowel is nondistended. There is mild diffuse mesenteric edema. There is a small volume of   perihepatic ascites. No pneumoperitoneum or lymphadenopathy. Aorta is normal diameter.      Impression:      Impression:    1. Hepatosplenomegaly.  2. Mesenteric edema and small volume ascites likely related to portal hypertension.  3. Areas of colonic wall thickening and adjacent edema that might relate to portal hypertensive colopathy or possibly colitis. Correlate with symptoms.            Electronically Signed: Almas Werner MD    4/16/2024 2:22 AM EDT    Workstation ID: [id number]            ASSESSMENT:  Elevated LFTs/jaundice  Elevated ammonia  Mild macrocytic anemia  Leukocytosis  Alcohol abuse  Electrolyte abnormalities    PLAN:  Patient is a 34-year-old male who presents with a 6-week history of jaundice in the setting of daily  alcohol abuse.  Last drink of alcohol 2 days ago.    Total bilirubin 30.2, alk phos 268,  with ALT 65.  INR 1.62.  Maddrey discriminant function 57.8, suggesting need for prednisolone.  Primary already started prednisolone but significant leukocytosis with WBC 22.6.  We will check UA and chest x-ray to rule out infection.  CT abdomen/pelvis WO showed hepatosplenomegaly with mesenteric edema and small ascites with changes of portal hypertension.  No abdominal pain.  Would recommend Acetadote per protocol.  Good nutrition encouraged, add Ensure/snacks for nutritional support.    Monitor for alcohol withdrawal, on CIWA protocol.  Add multivitamin, folic acid, zinc and thiamine.  Elevated ammonia at 92, add Xifaxan twice daily.  He is currently having 2 bowel movements daily, monitor stool output and consider lactulose if needed.  Full liver serology workup ordered, acute hepatitis panel negative.    Magnesium 2.0, Phos 1.8, sodium 131 and potassium 2.9.  Electrolyte replacement per primary.  Hemoglobin 11.6 with MCV 98.2, check B12, folate and iron.  Supplementation as indicated.  Continue supportive care and we will follow.  Discussed with bedside RN this morning on rounds.    I discussed the patients findings and my recommendations with the patient.    We appreciate the referral    Electronically signed by GULSHAN Brantley, 04/16/24, 10:48 AM EDT.                 Electronically signed by Ronaldo Shen MD at 04/16/24 3701

## 2024-04-17 NOTE — PROGRESS NOTES
[facility] MEDICINE SERVICE  DAILY PROGRESS NOTE    NAME: Duglas Candelaria  : 1990  MRN: [id number]      LOS: 1 day     PROVIDER OF SERVICE: Austen Montanez MD    Chief Complaint: Jaundice    Subjective:     Interval History: Patient continues to feel well, denies any withdrawal symptoms.  He is tolerating p.o. intake.  Denies any abdominal pain, nausea or vomiting.    Review of Systems:   Review of Systems    Objective:     Vital Signs  Temp:  [97.8 °F (36.6 °C)-98.4 °F (36.9 °C)] 98.2 °F (36.8 °C)  Heart Rate:  [] 113  Resp:  [20-25] 20  BP: (113-141)/(59-86) 141/86   Body mass index is 27.11 kg/m².    Physical Exam  Physical Exam  General Appearance:  Alert, cooperative, no distress, appears stated age  Head:  Normocephalic, without obvious abnormality, atraumatic  Eyes:  PERRL, conjunctiva/corneas clear, EOM's intact, fundi benign, bilateral icteric sclera  Ears:  Normal TM's and external ear canals, both ears  Nose: Nares normal, septum midline, mucosa normal, no drainage or sinus tenderness  Throat: Lips, mucosa, and tongue normal; teeth and gums normal  Neck: Supple, symmetrical, trachea midline, no adenopathy, thyroid: not enlarged, symmetric, no tenderness/mass/nodules, no carotid bruit or JVD  Lungs:   Clear to auscultation bilaterally, respirations unlabored  Heart:  Regular rate and rhythm, S1, S2 normal, no murmur, rub or gallop  Abdomen:  Soft, non-tender, bowel sounds active all four quadrants,  no masses, no organomegaly  Extremities: Extremities normal, atraumatic, no cyanosis or edema  Pulses: 2+ and symmetric  Skin: Skin color, texture, turgor normal, no rashes or lesions, jaundiced  Neurologic: Normal      Scheduled Meds   folic acid, 1 mg, Oral, Daily  lactulose, 10 g, Oral, Daily  LORazepam, 1 mg, Oral, Q6H  methylPREDNISolone, 40 mg, Oral, Daily With Breakfast  multivitamin, 1 tablet, Oral, Daily  nicotine, 1 patch, Transdermal, Daily  potassium chloride, 40 mEq, Oral,  Q4H  potassium phosphate, 15 mmol, Intravenous, Once  rifAXIMin, 550 mg, Oral, Q12H  sodium chloride, 10 mL, Intravenous, Q12H  thiamine (B-1) IV, 200 mg, Intravenous, Q8H   Followed by  [START ON 4/21/2024] thiamine, 100 mg, Oral, Daily  zinc sulfate, 220 mg, Oral, Daily       PRN Meds     senna-docusate sodium **AND** polyethylene glycol **AND** bisacodyl **AND** bisacodyl    LORazepam **OR** LORazepam **OR** LORazepam **OR** LORazepam **OR** LORazepam **OR** LORazepam    Magnesium Standard Dose Replacement - Follow Nurse / BPA Driven Protocol    nitroglycerin    ondansetron    Phosphorus Replacement - Follow Nurse / BPA Driven Protocol    Potassium Replacement - Follow Nurse / BPA Driven Protocol    sodium chloride    sodium chloride   Infusions  acetylcysteine (ACETADOTE) 6000 mg in D5W infusion (NON-APAP LIVER FAILURE) Third Dose, 6.25 mg/kg/hr, Last Rate: 6.25 mg/kg/hr (04/17/24 0312)          Diagnostic Data    Results from last 7 days   Lab Units 04/17/24  1100 04/17/24  0309   WBC 10*3/mm3  --  18.85*   HEMOGLOBIN g/dL  --  10.3*   HEMATOCRIT %  --  28.5*   PLATELETS 10*3/mm3  --  179   GLUCOSE mg/dL  --  159*   CREATININE mg/dL  --  0.31*   BUN mg/dL  --  5*   SODIUM mmol/L  --  132*   POTASSIUM mmol/L 3.5 3.0*   AST (SGOT) U/L  --  111*   ALT (SGPT) U/L  --  48*   ALK PHOS U/L  --  188*   BILIRUBIN mg/dL  --  24.0*   ANION GAP mmol/L  --  11.0       XR Chest PA & Lateral    Result Date: 4/16/2024  Impression: No acute cardiopulmonary findings. Electronically Signed: Barron Mcdaniel MD  4/16/2024 10:14 AM EDT  Workstation ID: [id number]    CT Abdomen Pelvis Without Contrast    Result Date: 4/16/2024  Impression: 1. Hepatosplenomegaly. 2. Mesenteric edema and small volume ascites likely related to portal hypertension. 3. Areas of colonic wall thickening and adjacent edema that might relate to portal hypertensive colopathy or possibly colitis. Correlate with symptoms. Electronically Signed: Almas Werner MD   4/16/2024 2:22 AM EDT  Workstation ID: [id number]       I reviewed the patient's new clinical results.    Assessment/Plan:     Active and Resolved Problems  Active Hospital Problems    Diagnosis  POA    **Jaundice [R17]  Yes      Resolved Hospital Problems   No resolved problems to display.       Alcohol abuse with acute alcoholic hepatitis  -Patient has daily alcohol use for the last several years  -Presents with significant elevated bilirubin, slightly elevated AST and ALT consistent with alcoholic hepatitis  -Elevated INR consistent with coagulopathy related to acute liver dysfunction  -Maddrey discriminant function score of 57.8 suggests patient would benefit from steroid therapy; he has been initiated on oral methylprednisolone  -Check Lille score on day 7 to assess efficacy of steroid therapy  -Initiated on N-acetylcysteine protocol per GI  -CT of the abdomen/pelvis does not show any acute pathology, although there is evidence of hepatosplenomegaly with mesenteric edema and small ascites all consistent with liver dysfunction  -Bilirubin improving although INR elevated today and patient will be given 1 dose of vitamin K  -Encourage p.o. intake  -Initiated on CIWA protocol for possible alcohol withdrawal with addition of multivitamin, folic acid, zinc, thiamine  -Continue Xifaxan  -Continue lactulose to titrate for 2-3 BM per day  -Patient with leukocytosis although no significant finding consistent with infectious process, given that chest x-ray is normal and urinalysis is not really consistent with UTI; can stop Rocephin    Acute hypophosphatemia  -Replete    Acute hypokalemia  -Replete        DVT prophylaxis:  Mechanical DVT prophylaxis orders are present.         Code status is   Code Status and Medical Interventions:   Ordered at: 04/16/24 0046     Code Status (Patient has no pulse and is not breathing):    CPR (Attempt to Resuscitate)     Medical Interventions (Patient has pulse or is breathing):    Full  Support       Plan for disposition: Anticipate patient will discharge home hopefully on 4/20    Time: 30 minutes    Signature: Electronically signed by Austen Montanez MD, 04/17/24, 12:13 EDT.  [facility]ist Team

## 2024-04-17 NOTE — PLAN OF CARE
Goal Outcome Evaluation:           Progress: no change  Outcome Evaluation: Sclera & skin remain jaundice. ABD remains distended & firm. Pt reports having BMs. Trace edema to BLE. Pt currently on acetylcysteine protocol for liver failure. Pt tolerating well. CIWA continues. Nutrition encouraged. Pt resting abed. Call light within reach. Plan of care ongoing.         Problem: Adult Inpatient Plan of Care  Goal: Plan of Care Review  Outcome: Ongoing, Progressing  Flowsheets (Taken 4/17/2024 0343)  Progress: no change  Outcome Evaluation: Sclera & skin remain jaundice. ABD remains distended & firm. Pt reports having BMs. Trace edema to BLE. Pt currently on acetylcysteine protocol for liver failure. Pt tolerating well. CIWA continues. Nutrition encouraged. Pt resting abed. Call light within reach. Plan of care ongoing.  Goal: Patient-Specific Goal (Individualized)  Outcome: Ongoing, Progressing  Goal: Absence of Hospital-Acquired Illness or Injury  Outcome: Ongoing, Progressing  Intervention: Identify and Manage Fall Risk  Recent Flowsheet Documentation  Taken 4/17/2024 0200 by Nate Noble, RN  Safety Promotion/Fall Prevention:   safety round/check completed   fall prevention program maintained  Taken 4/17/2024 0000 by Nate Noble, CLINT  Safety Promotion/Fall Prevention:   safety round/check completed   fall prevention program maintained  Taken 4/16/2024 2207 by Nate Noble, RN  Safety Promotion/Fall Prevention:   safety round/check completed   fall prevention program maintained  Taken 4/16/2024 2000 by Nate Noble, RN  Safety Promotion/Fall Prevention:   safety round/check completed   nonskid shoes/slippers when out of bed   fall prevention program maintained   clutter free environment maintained   assistive device/personal items within reach   activity supervised  Intervention: Prevent Skin Injury  Recent Flowsheet Documentation  Taken 4/17/2024 0000 by Nate Noble, RN  Body Position: position  changed independently  Skin Protection:   adhesive use limited   incontinence pads utilized   tubing/devices free from skin contact  Taken 4/16/2024 2000 by Nate Noble RN  Body Position: position changed independently  Skin Protection:   adhesive use limited   incontinence pads utilized   tubing/devices free from skin contact  Intervention: Prevent and Manage VTE (Venous Thromboembolism) Risk  Recent Flowsheet Documentation  Taken 4/17/2024 0000 by Nate Noble RN  Activity Management: activity minimized  Taken 4/16/2024 2115 by Nate Noble RN  Activity Management:   ambulated to bathroom   back to bed  Taken 4/16/2024 2000 by Nate Noble RN  Activity Management: activity encouraged  Intervention: Prevent Infection  Recent Flowsheet Documentation  Taken 4/17/2024 0200 by Nate Noble RN  Infection Prevention:   hand hygiene promoted   personal protective equipment utilized   rest/sleep promoted   single patient room provided  Taken 4/17/2024 0000 by Nate Noble RN  Infection Prevention:   hand hygiene promoted   personal protective equipment utilized   rest/sleep promoted   single patient room provided  Taken 4/16/2024 2207 by Nate Noble RN  Infection Prevention:   hand hygiene promoted   personal protective equipment utilized   rest/sleep promoted   single patient room provided  Taken 4/16/2024 2000 by Nate Noble RN  Infection Prevention:   hand hygiene promoted   personal protective equipment utilized   rest/sleep promoted   single patient room provided  Goal: Optimal Comfort and Wellbeing  Outcome: Ongoing, Progressing  Intervention: Monitor Pain and Promote Comfort  Recent Flowsheet Documentation  Taken 4/16/2024 2000 by Nate Noble RN  Pain Management Interventions:   care clustered   diversional activity provided   pillow support provided   position adjusted   quiet environment facilitated   relaxation techniques promoted   unnecessary movement  minimized  Intervention: Provide Person-Centered Care  Recent Flowsheet Documentation  Taken 4/16/2024 2000 by Nate Noble RN  Trust Relationship/Rapport:   care explained   choices provided   questions answered   questions encouraged   reassurance provided   thoughts/feelings acknowledged  Goal: Readiness for Transition of Care  Outcome: Ongoing, Progressing     Problem: Alcohol Withdrawal  Goal: Alcohol Withdrawal Symptom Control  Outcome: Ongoing, Progressing  Intervention: Minimize or Manage Alcohol Withdrawal Symptoms  Recent Flowsheet Documentation  Taken 4/16/2024 2000 by Nate Noble RN  Sensory Stimulation Regulation:   care clustered   quiet environment promoted   television on     Problem: Acute Neurologic Deterioration (Alcohol Withdrawal)  Goal: Optimal Neurologic Function  Outcome: Ongoing, Progressing  Intervention: Minimize or Manage Acute Neurologic Symptoms  Recent Flowsheet Documentation  Taken 4/17/2024 0000 by Nate Noble RN  Airway/Ventilation Management: calming measures promoted  Cerebral Perfusion Promotion: blood pressure monitored  Taken 4/16/2024 2000 by Nate Noble RN  Sensory Stimulation Regulation:   care clustered   quiet environment promoted   television on  Airway/Ventilation Management: calming measures promoted  Cerebral Perfusion Promotion: blood pressure monitored     Problem: Substance Misuse (Alcohol Withdrawal)  Goal: Readiness for Change Identified  Outcome: Ongoing, Progressing  Intervention: Partner to Facilitate Behavior Change  Recent Flowsheet Documentation  Taken 4/16/2024 2000 by Nate Noble RN  Supportive Measures:   active listening utilized   verbalization of feelings encouraged     Problem: Adjustment to Illness (Liver Failure)  Goal: Optimal Coping with Liver Failure  Outcome: Ongoing, Progressing  Intervention: Support Response to Liver Disease  Recent Flowsheet Documentation  Taken 4/16/2024 2000 by Nate Noble, RN  Supportive  Measures:   active listening utilized   verbalization of feelings encouraged  Diversional Activities: television     Problem: Fluid and Electrolyte Imbalance (Liver Failure)  Goal: Fluid and Electrolyte Balance  Outcome: Ongoing, Progressing  Intervention: Monitor and Manage Fluid and Electrolyte Balance  Recent Flowsheet Documentation  Taken 4/16/2024 2000 by Nate Noble RN  Fluid/Electrolyte Management: fluids provided     Problem: Gastrointestinal Complications (Liver Failure)  Goal: Optimal Gastrointestinal Function  Outcome: Ongoing, Progressing  Intervention: Monitor and Support Gastrointestinal Function  Recent Flowsheet Documentation  Taken 4/17/2024 0000 by Nate Noble RN  Body Position: position changed independently  Taken 4/16/2024 2000 by Nate Noble RN  Body Position: position changed independently     Problem: Impaired Coagulation (Liver Failure)  Goal: Optimal Coagulation Function  Outcome: Ongoing, Progressing     Problem: Infection (Liver Failure)  Goal: Absence of Infection Signs and Symptoms  Outcome: Ongoing, Progressing     Problem: Neurologic Function Impaired (Liver Failure)  Goal: Optimal Neurologic Function  Outcome: Ongoing, Progressing  Intervention: Monitor and Optimize Neurologic Status  Recent Flowsheet Documentation  Taken 4/16/2024 2000 by Nate Noble RN  Sensory Stimulation Regulation:   care clustered   quiet environment promoted   television on     Problem: Oral Intake Inadequate (Liver Failure)  Goal: Improved Oral Intake  Outcome: Ongoing, Progressing  Intervention: Promote and Optimize Nutrition  Recent Flowsheet Documentation  Taken 4/16/2024 2000 by Nate Noble RN  Environmental Support:   calm environment promoted   environmental consistency promoted     Problem: Pain (Liver Failure)  Goal: Optimal Pain Control  Outcome: Ongoing, Progressing  Intervention: Prevent or Manage Pain  Recent Flowsheet Documentation  Taken 4/16/2024 2000 by Real  CLINT Sullivan  Pain Management Interventions:   care clustered   diversional activity provided   pillow support provided   position adjusted   quiet environment facilitated   relaxation techniques promoted   unnecessary movement minimized  Sleep/Rest Enhancement:   awakenings minimized   consistent schedule promoted   regular sleep/rest pattern promoted   relaxation techniques promoted     Problem: Renal Dysfunction (Liver Failure)  Goal: Optimize Renal Function  Outcome: Ongoing, Progressing     Problem: Respiratory Compromise (Liver Failure)  Goal: Effective Oxygenation and Ventilation  Outcome: Ongoing, Progressing  Intervention: Optimize Oxygenation and Ventilation  Recent Flowsheet Documentation  Taken 4/17/2024 0000 by Nate Noble RN  Head of Bed ([facility]) Positioning: HOB elevated  Airway/Ventilation Management: calming measures promoted  Taken 4/16/2024 2000 by Nate Noble RN  Head of Bed ([facility]) Positioning: HOB elevated  Airway/Ventilation Management: calming measures promoted  Intervention: Promote Airway Secretion Clearance  Recent Flowsheet Documentation  Taken 4/17/2024 0000 by Nate Noble RN  Activity Management: activity minimized  Breathing Techniques/Airway Clearance: deep/controlled cough encouraged  Cough And Deep Breathing: done independently per patient  Taken 4/16/2024 2115 by Nate Noble, RN  Activity Management:   ambulated to bathroom   back to bed  Taken 4/16/2024 2000 by Nate Noble RN  Activity Management: activity encouraged  Breathing Techniques/Airway Clearance: deep/controlled cough encouraged  Cough And Deep Breathing: done independently per patient     Problem: Adjustment to Illness (Sepsis/Septic Shock)  Goal: Optimal Coping  Outcome: Ongoing, Progressing  Intervention: Optimize Psychosocial Adjustment to Illness  Recent Flowsheet Documentation  Taken 4/16/2024 2000 by Nate Noble, RN  Supportive Measures:   active listening utilized   verbalization of feelings  encouraged     Problem: Bleeding (Sepsis/Septic Shock)  Goal: Absence of Bleeding  Outcome: Ongoing, Progressing     Problem: Glycemic Control Impaired (Sepsis/Septic Shock)  Goal: Blood Glucose Level Within Desired Range  Outcome: Ongoing, Progressing  Intervention: Optimize Glycemic Control  Recent Flowsheet Documentation  Taken 4/17/2024 0000 by Nate Noble RN  Glycemic Management: oral hydration promoted  Taken 4/16/2024 2000 by Nate Noble RN  Glycemic Management: oral hydration promoted     Problem: Infection Progression (Sepsis/Septic Shock)  Goal: Absence of Infection Signs and Symptoms  Outcome: Ongoing, Progressing  Intervention: Initiate Sepsis Management  Recent Flowsheet Documentation  Taken 4/17/2024 0200 by Nate Noble RN  Infection Prevention:   hand hygiene promoted   personal protective equipment utilized   rest/sleep promoted   single patient room provided  Taken 4/17/2024 0000 by Nate Noble RN  Infection Prevention:   hand hygiene promoted   personal protective equipment utilized   rest/sleep promoted   single patient room provided  Taken 4/16/2024 2207 by Nate Noble RN  Infection Prevention:   hand hygiene promoted   personal protective equipment utilized   rest/sleep promoted   single patient room provided  Taken 4/16/2024 2000 by Nate Noble RN  Infection Prevention:   hand hygiene promoted   personal protective equipment utilized   rest/sleep promoted   single patient room provided  Intervention: Promote Recovery  Recent Flowsheet Documentation  Taken 4/17/2024 0000 by Nate Noble RN  Activity Management: activity minimized  Taken 4/16/2024 2115 by Nate Noble RN  Activity Management:   ambulated to bathroom   back to bed  Taken 4/16/2024 2000 by Nate Noble RN  Activity Management: activity encouraged  Sleep/Rest Enhancement:   awakenings minimized   consistent schedule promoted   regular sleep/rest pattern promoted   relaxation techniques  promoted  Intervention: Promote Stabilization  Recent Flowsheet Documentation  Taken 4/16/2024 2000 by Nate Noble, RN  Fluid/Electrolyte Management: fluids provided     Problem: Nutrition Impaired (Sepsis/Septic Shock)  Goal: Optimal Nutrition Intake  Outcome: Ongoing, Progressing

## 2024-04-17 NOTE — PLAN OF CARE
Goal Outcome Evaluation:  Plan of Care Reviewed With: patient        Progress: no change  Outcome Evaluation: Pt is a 35 y/o M who presented to [facility] w/ jaundice and trace BLE edema, currently on acetylcysteine protocol for liver failure, direct admit from [place] (drinks 10-12 beers/day). At baseline, pt Speaks French (social hx/PLOF obtained through chart review) and lives alone, was working as a  and would either walk to work or receive transportation from friends. During eval, pt supine in bed, no tremors noted, required supervision for bed mobility, CGA for transfers with RW with onset of tremors, and CGA for gait training 15 ft w/ RW with tremulous gait, mild ataxia, B foot slap, and disontinuous steps. Pt also with tachycardia with -122 bpm throughout session. At this time, pt not safe for home alone, recommending substance abuse rehab vs. SNF. Plan to see 3x/week at [facility] for progression of mobility. PPE: gloves      Anticipated Discharge Disposition (PT):  (Substance abuse rehab vs. SNF)

## 2024-04-18 ENCOUNTER — INPATIENT HOSPITAL (OUTPATIENT)
Dept: URBAN - METROPOLITAN AREA HOSPITAL 84 | Facility: HOSPITAL | Age: 34
End: 2024-04-18
Payer: MEDICAID

## 2024-04-18 DIAGNOSIS — R94.5 ABNORMAL RESULTS OF LIVER FUNCTION STUDIES: ICD-10-CM

## 2024-04-18 LAB
ALBUMIN SERPL-MCNC: 2.7 G/DL (ref 3.5–5.2)
ALBUMIN/GLOB SERPL: 0.6 G/DL
ALP SERPL-CCNC: 225 U/L (ref 39–117)
ALT SERPL W P-5'-P-CCNC: 54 U/L (ref 1–41)
ANION GAP SERPL CALCULATED.3IONS-SCNC: 12 MMOL/L (ref 5–15)
AST SERPL-CCNC: 95 U/L (ref 1–40)
BASOPHILS # BLD AUTO: 0.04 10*3/MM3 (ref 0–0.2)
BASOPHILS NFR BLD AUTO: 0.2 % (ref 0–1.5)
BILIRUB SERPL-MCNC: 23.2 MG/DL (ref 0–1.2)
BUN SERPL-MCNC: 7 MG/DL (ref 6–20)
BUN/CREAT SERPL: 17.1 (ref 7–25)
CALCIUM SPEC-SCNC: 8.6 MG/DL (ref 8.6–10.5)
CHLORIDE SERPL-SCNC: 104 MMOL/L (ref 98–107)
CO2 SERPL-SCNC: 19 MMOL/L (ref 22–29)
CREAT SERPL-MCNC: 0.41 MG/DL (ref 0.76–1.27)
DEPRECATED RDW RBC AUTO: 64.2 FL (ref 37–54)
EGFRCR SERPLBLD CKD-EPI 2021: 147.6 ML/MIN/1.73
EOSINOPHIL # BLD AUTO: 0 10*3/MM3 (ref 0–0.4)
EOSINOPHIL NFR BLD AUTO: 0 % (ref 0.3–6.2)
ERYTHROCYTE [DISTWIDTH] IN BLOOD BY AUTOMATED COUNT: 17.8 % (ref 12.3–15.4)
GLOBULIN UR ELPH-MCNC: 4.3 GM/DL
GLUCOSE SERPL-MCNC: 123 MG/DL (ref 65–99)
HCT VFR BLD AUTO: 31 % (ref 37.5–51)
HGB BLD-MCNC: 11 G/DL (ref 13–17.7)
IMM GRANULOCYTES # BLD AUTO: 0.76 10*3/MM3 (ref 0–0.05)
IMM GRANULOCYTES NFR BLD AUTO: 3.1 % (ref 0–0.5)
INR PPP: 1.79 (ref 0.93–1.1)
LYMPHOCYTES # BLD AUTO: 1.2 10*3/MM3 (ref 0.7–3.1)
LYMPHOCYTES NFR BLD AUTO: 4.9 % (ref 19.6–45.3)
MAGNESIUM SERPL-MCNC: 2.3 MG/DL (ref 1.6–2.6)
MCH RBC QN AUTO: 35.7 PG (ref 26.6–33)
MCHC RBC AUTO-ENTMCNC: 35.5 G/DL (ref 31.5–35.7)
MCV RBC AUTO: 100.6 FL (ref 79–97)
MONOCYTES # BLD AUTO: 0.95 10*3/MM3 (ref 0.1–0.9)
MONOCYTES NFR BLD AUTO: 3.8 % (ref 5–12)
NEUTROPHILS NFR BLD AUTO: 21.73 10*3/MM3 (ref 1.7–7)
NEUTROPHILS NFR BLD AUTO: 88 % (ref 42.7–76)
NRBC BLD AUTO-RTO: 0.3 /100 WBC (ref 0–0.2)
PHOSPHATE SERPL-MCNC: 2.1 MG/DL (ref 2.5–4.5)
PHOSPHATE SERPL-MCNC: 2.1 MG/DL (ref 2.5–4.5)
PHOSPHATE SERPL-MCNC: 2.2 MG/DL (ref 2.5–4.5)
PLATELET # BLD AUTO: 223 10*3/MM3 (ref 140–450)
PMV BLD AUTO: 11.3 FL (ref 6–12)
POTASSIUM SERPL-SCNC: 4 MMOL/L (ref 3.5–5.2)
PROT SERPL-MCNC: 7 G/DL (ref 6–8.5)
PROTHROMBIN TIME: 18.7 SECONDS (ref 9.6–11.7)
RBC # BLD AUTO: 3.08 10*6/MM3 (ref 4.14–5.8)
SODIUM SERPL-SCNC: 135 MMOL/L (ref 136–145)
WBC NRBC COR # BLD AUTO: 24.68 10*3/MM3 (ref 3.4–10.8)

## 2024-04-18 PROCEDURE — 63710000001 METHYLPREDNISOLONE PER 4 MG: Performed by: INTERNAL MEDICINE

## 2024-04-18 PROCEDURE — 83735 ASSAY OF MAGNESIUM: CPT | Performed by: NURSE PRACTITIONER

## 2024-04-18 PROCEDURE — 85025 COMPLETE CBC W/AUTO DIFF WBC: CPT | Performed by: NURSE PRACTITIONER

## 2024-04-18 PROCEDURE — 84100 ASSAY OF PHOSPHORUS: CPT | Performed by: INTERNAL MEDICINE

## 2024-04-18 PROCEDURE — 25010000002 ACETYLCYSTEINE PER 100 MG: Performed by: NURSE PRACTITIONER

## 2024-04-18 PROCEDURE — 0 DEXTROSE 5 % SOLUTION 1,000 ML FLEX CONT: Performed by: NURSE PRACTITIONER

## 2024-04-18 PROCEDURE — 97116 GAIT TRAINING THERAPY: CPT

## 2024-04-18 PROCEDURE — 25010000002 THIAMINE HCL 200 MG/2ML SOLUTION: Performed by: NURSE PRACTITIONER

## 2024-04-18 PROCEDURE — 80053 COMPREHEN METABOLIC PANEL: CPT | Performed by: NURSE PRACTITIONER

## 2024-04-18 PROCEDURE — 25010000002 THIAMINE PER 100 MG: Performed by: NURSE PRACTITIONER

## 2024-04-18 PROCEDURE — 25010000002 LORAZEPAM PER 2 MG: Performed by: NURSE PRACTITIONER

## 2024-04-18 PROCEDURE — 85610 PROTHROMBIN TIME: CPT | Performed by: NURSE PRACTITIONER

## 2024-04-18 PROCEDURE — 99233 SBSQ HOSP IP/OBS HIGH 50: CPT | Mod: FS | Performed by: NURSE PRACTITIONER

## 2024-04-18 PROCEDURE — 63710000001 PREDNISOLONE PER 5 MG: Performed by: NURSE PRACTITIONER

## 2024-04-18 RX ORDER — PREDNISOLONE SODIUM PHOSPHATE 15 MG/5ML
40 SOLUTION ORAL DAILY
Status: DISCONTINUED | OUTPATIENT
Start: 2024-04-18 | End: 2024-04-22 | Stop reason: HOSPADM

## 2024-04-18 RX ORDER — CHLORDIAZEPOXIDE HYDROCHLORIDE 25 MG/1
25 CAPSULE, GELATIN COATED ORAL EVERY 8 HOURS SCHEDULED
Status: DISCONTINUED | OUTPATIENT
Start: 2024-04-18 | End: 2024-04-22 | Stop reason: HOSPADM

## 2024-04-18 RX ADMIN — CHLORDIAZEPOXIDE HYDROCHLORIDE 25 MG: 25 CAPSULE ORAL at 13:39

## 2024-04-18 RX ADMIN — ACETYLCYSTEINE 6.25 MG/KG/HR: 200 INJECTION, SOLUTION INTRAVENOUS at 19:00

## 2024-04-18 RX ADMIN — Medication 10 ML: at 08:38

## 2024-04-18 RX ADMIN — Medication 10 ML: at 22:08

## 2024-04-18 RX ADMIN — FOLIC ACID 1 MG: 1 TABLET ORAL at 08:37

## 2024-04-18 RX ADMIN — THIAMINE HYDROCHLORIDE 200 MG: 100 INJECTION, SOLUTION INTRAMUSCULAR; INTRAVENOUS at 04:52

## 2024-04-18 RX ADMIN — Medication 1 PATCH: at 08:37

## 2024-04-18 RX ADMIN — RIFAXIMIN 550 MG: 550 TABLET ORAL at 21:35

## 2024-04-18 RX ADMIN — THERA TABS 1 TABLET: TAB at 08:37

## 2024-04-18 RX ADMIN — LACTULOSE 10 G: 20 SOLUTION ORAL at 08:37

## 2024-04-18 RX ADMIN — PREDNISOLONE SODIUM PHOSPHATE 40 MG: 15 SOLUTION ORAL at 13:39

## 2024-04-18 RX ADMIN — POTASSIUM, SODIUM PHOSPHATES 280 MG-160 MG-250 MG ORAL POWDER PACKET 2 PACKET: POWDER IN PACKET at 13:39

## 2024-04-18 RX ADMIN — THIAMINE HYDROCHLORIDE 200 MG: 100 INJECTION, SOLUTION INTRAMUSCULAR; INTRAVENOUS at 21:34

## 2024-04-18 RX ADMIN — CHLORDIAZEPOXIDE HYDROCHLORIDE 25 MG: 25 CAPSULE ORAL at 21:35

## 2024-04-18 RX ADMIN — METHYLPREDNISOLONE 40 MG: 4 TABLET ORAL at 08:37

## 2024-04-18 RX ADMIN — POTASSIUM, SODIUM PHOSPHATES 280 MG-160 MG-250 MG ORAL POWDER PACKET 2 PACKET: POWDER IN PACKET at 05:49

## 2024-04-18 RX ADMIN — LORAZEPAM 2 MG: 2 INJECTION INTRAMUSCULAR; INTRAVENOUS at 04:51

## 2024-04-18 RX ADMIN — Medication 220 MG: at 08:37

## 2024-04-18 RX ADMIN — THIAMINE HYDROCHLORIDE 200 MG: 100 INJECTION, SOLUTION INTRAMUSCULAR; INTRAVENOUS at 13:39

## 2024-04-18 RX ADMIN — RIFAXIMIN 550 MG: 550 TABLET ORAL at 08:37

## 2024-04-18 RX ADMIN — ACETYLCYSTEINE 6.25 MG/KG/HR: 200 INJECTION, SOLUTION INTRAVENOUS at 05:49

## 2024-04-18 NOTE — PLAN OF CARE
Goal Outcome Evaluation:               Duglas Candelaria presents with functional mobility impairments which indicate the need for skilled intervention. Pt suffering ETOH w/d, tremulous and unsteady standing and walking putting him at high risk for falls.  At  this time, pt not safe for home alone and recommend SNF for rehab at d/c.  Tolerating session today without incident. Will continue to follow and progress as tolerated.

## 2024-04-18 NOTE — PLAN OF CARE
Goal Outcome Evaluation:           Progress: no change  Outcome Evaluation: CIWA protocol continues. Pt remains in sz precautions as indicated. Acetylcysteine gtt continues per orders. K+, Phos replaced per orders. Skin & sclera remain jaundiced. Pt producing BMs. Boost+ provided per orders. Pt resting abed throughout the noc with call light within reach. Plan of care ongoing.        Problem: Adult Inpatient Plan of Care  Goal: Plan of Care Review  Outcome: Ongoing, Progressing  Flowsheets (Taken 4/18/2024 0331)  Progress: no change  Outcome Evaluation: CIWA protocol continues. Pt remains in sz precautions as indicated. Acetylcysteine gtt continues per orders. K+, Phos replaced per orders. Skin & sclera remain jaundiced. Pt producing BMs. Boost+ provided per orders. Pt resting abed throughout the noc with call light within reach. Plan of care ongoing.  Goal: Patient-Specific Goal (Individualized)  Outcome: Ongoing, Progressing  Goal: Absence of Hospital-Acquired Illness or Injury  Outcome: Ongoing, Progressing  Intervention: Identify and Manage Fall Risk  Recent Flowsheet Documentation  Taken 4/18/2024 0200 by Nate Noble, RN  Safety Promotion/Fall Prevention:   safety round/check completed   fall prevention program maintained  Taken 4/18/2024 0000 by Nate Noble, CLINT  Safety Promotion/Fall Prevention:   safety round/check completed   fall prevention program maintained  Taken 4/17/2024 2200 by Nate Noble, RN  Safety Promotion/Fall Prevention:   safety round/check completed   gait belt   fall prevention program maintained  Taken 4/17/2024 2000 by Nate Noble, RN  Safety Promotion/Fall Prevention:   safety round/check completed   nonskid shoes/slippers when out of bed   gait belt   fall prevention program maintained   clutter free environment maintained   assistive device/personal items within reach   activity supervised  Intervention: Prevent Skin Injury  Recent Flowsheet Documentation  Taken  4/18/2024 0000 by Nate Noble RN  Body Position: position changed independently  Skin Protection:   adhesive use limited   incontinence pads utilized   tubing/devices free from skin contact  Taken 4/17/2024 2000 by Nate Noble RN  Body Position: position changed independently  Skin Protection:   adhesive use limited   incontinence pads utilized   tubing/devices free from skin contact  Intervention: Prevent and Manage VTE (Venous Thromboembolism) Risk  Recent Flowsheet Documentation  Taken 4/18/2024 0000 by Nate Noble RN  Activity Management: activity minimized  Taken 4/17/2024 2200 by Nate Noble RN  Activity Management:   ambulated to bathroom   back to bed  Taken 4/17/2024 2000 by Nate Noble RN  Activity Management: activity minimized  Intervention: Prevent Infection  Recent Flowsheet Documentation  Taken 4/18/2024 0200 by Nate Noble RN  Infection Prevention:   hand hygiene promoted   personal protective equipment utilized   rest/sleep promoted   single patient room provided  Taken 4/18/2024 0000 by Nate Noble RN  Infection Prevention:   hand hygiene promoted   personal protective equipment utilized   rest/sleep promoted   single patient room provided  Taken 4/17/2024 2200 by Nate Noble RN  Infection Prevention:   hand hygiene promoted   personal protective equipment utilized   rest/sleep promoted   single patient room provided  Taken 4/17/2024 2000 by Nate Noble RN  Infection Prevention:   hand hygiene promoted   personal protective equipment utilized   rest/sleep promoted   single patient room provided  Goal: Optimal Comfort and Wellbeing  Outcome: Ongoing, Progressing  Intervention: Monitor Pain and Promote Comfort  Recent Flowsheet Documentation  Taken 4/17/2024 2000 by Nate Noble RN  Pain Management Interventions:   care clustered   diversional activity provided   pillow support provided   position adjusted   quiet environment facilitated   relaxation  techniques promoted   unnecessary movement minimized  Intervention: Provide Person-Centered Care  Recent Flowsheet Documentation  Taken 4/17/2024 2000 by Nate Noble RN  Trust Relationship/Rapport:   care explained   choices provided   questions answered   questions encouraged   reassurance provided   thoughts/feelings acknowledged  Goal: Readiness for Transition of Care  Outcome: Ongoing, Progressing     Problem: Alcohol Withdrawal  Goal: Alcohol Withdrawal Symptom Control  Outcome: Ongoing, Progressing  Intervention: Minimize or Manage Alcohol Withdrawal Symptoms  Recent Flowsheet Documentation  Taken 4/17/2024 2000 by Nate Noble RN  Sensory Stimulation Regulation:   care clustered   lighting decreased   television on     Problem: Acute Neurologic Deterioration (Alcohol Withdrawal)  Goal: Optimal Neurologic Function  Outcome: Ongoing, Progressing  Intervention: Minimize or Manage Acute Neurologic Symptoms  Recent Flowsheet Documentation  Taken 4/18/2024 0000 by Nate Noble RN  Airway/Ventilation Management: calming measures promoted  Taken 4/17/2024 2000 by Nate Noble RN  Sensory Stimulation Regulation:   care clustered   lighting decreased   television on  Airway/Ventilation Management: calming measures promoted  Cerebral Perfusion Promotion: blood pressure monitored     Problem: Substance Misuse (Alcohol Withdrawal)  Goal: Readiness for Change Identified  Outcome: Ongoing, Progressing  Intervention: Partner to Facilitate Behavior Change  Recent Flowsheet Documentation  Taken 4/17/2024 2000 by Nate Noble RN  Supportive Measures:   active listening utilized   verbalization of feelings encouraged     Problem: Adjustment to Illness (Liver Failure)  Goal: Optimal Coping with Liver Failure  Outcome: Ongoing, Progressing  Intervention: Support Response to Liver Disease  Recent Flowsheet Documentation  Taken 4/17/2024 2000 by Nate Noble RN  Supportive Measures:   active listening  utilized   verbalization of feelings encouraged  Diversional Activities:   television   smartphone     Problem: Fluid and Electrolyte Imbalance (Liver Failure)  Goal: Fluid and Electrolyte Balance  Outcome: Ongoing, Progressing  Intervention: Monitor and Manage Fluid and Electrolyte Balance  Recent Flowsheet Documentation  Taken 4/18/2024 0000 by Nate Noble RN  Fluid/Electrolyte Management: fluids provided  Taken 4/17/2024 2000 by Nate Noble RN  Fluid/Electrolyte Management: fluids provided     Problem: Gastrointestinal Complications (Liver Failure)  Goal: Optimal Gastrointestinal Function  Outcome: Ongoing, Progressing  Intervention: Monitor and Support Gastrointestinal Function  Recent Flowsheet Documentation  Taken 4/18/2024 0000 by Nate Noble RN  Body Position: position changed independently  Taken 4/17/2024 2000 by Nate Noble RN  Body Position: position changed independently     Problem: Impaired Coagulation (Liver Failure)  Goal: Optimal Coagulation Function  Outcome: Ongoing, Progressing     Problem: Infection (Liver Failure)  Goal: Absence of Infection Signs and Symptoms  Outcome: Ongoing, Progressing     Problem: Neurologic Function Impaired (Liver Failure)  Goal: Optimal Neurologic Function  Outcome: Ongoing, Progressing  Intervention: Monitor and Optimize Neurologic Status  Recent Flowsheet Documentation  Taken 4/17/2024 2000 by Nate Noble RN  Sensory Stimulation Regulation:   care clustered   lighting decreased   television on     Problem: Oral Intake Inadequate (Liver Failure)  Goal: Improved Oral Intake  Outcome: Ongoing, Progressing  Intervention: Promote and Optimize Nutrition  Recent Flowsheet Documentation  Taken 4/17/2024 2000 by Nate Noble RN  Environmental Support:   calm environment promoted   environmental consistency promoted     Problem: Pain (Liver Failure)  Goal: Optimal Pain Control  Outcome: Ongoing, Progressing  Intervention: Prevent or Manage  Pain  Recent Flowsheet Documentation  Taken 4/17/2024 2000 by Nate Noble, RN  Pain Management Interventions:   care clustered   diversional activity provided   pillow support provided   position adjusted   quiet environment facilitated   relaxation techniques promoted   unnecessary movement minimized  Sleep/Rest Enhancement:   awakenings minimized   consistent schedule promoted   regular sleep/rest pattern promoted   relaxation techniques promoted   room darkened     Problem: Renal Dysfunction (Liver Failure)  Goal: Optimize Renal Function  Outcome: Ongoing, Progressing     Problem: Respiratory Compromise (Liver Failure)  Goal: Effective Oxygenation and Ventilation  Outcome: Ongoing, Progressing  Intervention: Optimize Oxygenation and Ventilation  Recent Flowsheet Documentation  Taken 4/18/2024 0000 by Nate Noble RN  Head of Bed ([facility]) Positioning: HOB elevated  Airway/Ventilation Management: calming measures promoted  Taken 4/17/2024 2000 by Nate Noble RN  Head of Bed ([facility]) Positioning: HOB elevated  Airway/Ventilation Management: calming measures promoted  Intervention: Promote Airway Secretion Clearance  Recent Flowsheet Documentation  Taken 4/18/2024 0000 by Nate Noble RN  Activity Management: activity minimized  Breathing Techniques/Airway Clearance: deep/controlled cough encouraged  Cough And Deep Breathing: done independently per patient  Taken 4/17/2024 2200 by Nate Noble, RN  Activity Management:   ambulated to bathroom   back to bed  Taken 4/17/2024 2000 by Nate Noble RN  Activity Management: activity minimized  Breathing Techniques/Airway Clearance: deep/controlled cough encouraged  Cough And Deep Breathing: done independently per patient     Problem: Adjustment to Illness (Sepsis/Septic Shock)  Goal: Optimal Coping  Outcome: Ongoing, Progressing  Intervention: Optimize Psychosocial Adjustment to Illness  Recent Flowsheet Documentation  Taken 4/17/2024 2000 by Real  CLINT Sullivan  Supportive Measures:   active listening utilized   verbalization of feelings encouraged     Problem: Bleeding (Sepsis/Septic Shock)  Goal: Absence of Bleeding  Outcome: Ongoing, Progressing     Problem: Glycemic Control Impaired (Sepsis/Septic Shock)  Goal: Blood Glucose Level Within Desired Range  Outcome: Ongoing, Progressing  Intervention: Optimize Glycemic Control  Recent Flowsheet Documentation  Taken 4/18/2024 0000 by Nate Noble RN  Glycemic Management: oral hydration promoted  Taken 4/17/2024 2000 by Nate Noble RN  Glycemic Management: oral hydration promoted     Problem: Infection Progression (Sepsis/Septic Shock)  Goal: Absence of Infection Signs and Symptoms  Outcome: Ongoing, Progressing  Intervention: Initiate Sepsis Management  Recent Flowsheet Documentation  Taken 4/18/2024 0200 by Nate Noble RN  Infection Prevention:   hand hygiene promoted   personal protective equipment utilized   rest/sleep promoted   single patient room provided  Taken 4/18/2024 0000 by Nate Noble RN  Infection Prevention:   hand hygiene promoted   personal protective equipment utilized   rest/sleep promoted   single patient room provided  Taken 4/17/2024 2200 by Nate Noble RN  Infection Prevention:   hand hygiene promoted   personal protective equipment utilized   rest/sleep promoted   single patient room provided  Taken 4/17/2024 2000 by Nate Noble RN  Infection Prevention:   hand hygiene promoted   personal protective equipment utilized   rest/sleep promoted   single patient room provided  Intervention: Promote Recovery  Recent Flowsheet Documentation  Taken 4/18/2024 0000 by Nate Noble RN  Activity Management: activity minimized  Airway/Ventilation Support: pulmonary hygiene promoted  Taken 4/17/2024 2200 by Nate Noble RN  Activity Management:   ambulated to bathroom   back to bed  Taken 4/17/2024 2000 by Nate Noble RN  Activity Management: activity  minimized  Airway/Ventilation Support: pulmonary hygiene promoted  Sleep/Rest Enhancement:   awakenings minimized   consistent schedule promoted   regular sleep/rest pattern promoted   relaxation techniques promoted   room darkened  Intervention: Promote Stabilization  Recent Flowsheet Documentation  Taken 4/18/2024 0000 by Nate Noble RN  Fluid/Electrolyte Management: fluids provided  Taken 4/17/2024 2000 by Nate Noble RN  Fluid/Electrolyte Management: fluids provided     Problem: Nutrition Impaired (Sepsis/Septic Shock)  Goal: Optimal Nutrition Intake  Outcome: Ongoing, Progressing     Problem: Skin Injury Risk Increased  Goal: Skin Health and Integrity  Outcome: Ongoing, Progressing  Intervention: Optimize Skin Protection  Recent Flowsheet Documentation  Taken 4/18/2024 0000 by Nate Noble RN  Pressure Reduction Techniques: frequent weight shift encouraged  Head of Bed (HOB) Positioning: HOB elevated  Pressure Reduction Devices: pressure-redistributing mattress utilized  Skin Protection:   adhesive use limited   incontinence pads utilized   tubing/devices free from skin contact  Taken 4/17/2024 2000 by Nate Noble RN  Head of Bed (HOB) Positioning: HOB elevated  Skin Protection:   adhesive use limited   incontinence pads utilized   tubing/devices free from skin contact     Problem: Fall Injury Risk  Goal: Absence of Fall and Fall-Related Injury  Outcome: Ongoing, Progressing  Intervention: Identify and Manage Contributors  Recent Flowsheet Documentation  Taken 4/18/2024 0200 by Nate Noble RN  Medication Review/Management: medications reviewed  Taken 4/18/2024 0000 by Nate Noble RN  Medication Review/Management: medications reviewed  Self-Care Promotion: BADL personal objects within reach  Taken 4/17/2024 2200 by Nate Noble RN  Medication Review/Management: medications reviewed  Taken 4/17/2024 2000 by Nate Noble RN  Medication Review/Management:   medications  reviewed   high-risk medications identified  Intervention: Promote Injury-Free Environment  Recent Flowsheet Documentation  Taken 4/18/2024 0200 by Nate Noble, RN  Safety Promotion/Fall Prevention:   safety round/check completed   fall prevention program maintained  Taken 4/18/2024 0000 by Nate Noble, RN  Safety Promotion/Fall Prevention:   safety round/check completed   fall prevention program maintained  Taken 4/17/2024 2200 by Nate Noble, RN  Safety Promotion/Fall Prevention:   safety round/check completed   gait belt   fall prevention program maintained  Taken 4/17/2024 2000 by Nate Noble, RN  Safety Promotion/Fall Prevention:   safety round/check completed   nonskid shoes/slippers when out of bed   gait belt   fall prevention program maintained   clutter free environment maintained   assistive device/personal items within reach   activity supervised

## 2024-04-18 NOTE — PROGRESS NOTES
[facility] MEDICINE SERVICE  DAILY PROGRESS NOTE    NAME: Duglas Candelaria  : 1990  MRN: [id number]      LOS: 2 days     PROVIDER OF SERVICE: Austen Montanez MD    Chief Complaint: Jaundice    Subjective:     Interval History: Patient continues to feel well, denies any withdrawal symptoms.  He is tolerating p.o. intake.  Denies any abdominal pain, nausea or vomiting.  He does state that he had some black stool that was loose last night.  However the nurse cannot confirm this.    Review of Systems:   Review of Systems    Objective:     Vital Signs  Temp:  [97.5 °F (36.4 °C)-98.8 °F (37.1 °C)] 97.6 °F (36.4 °C)  Heart Rate:  [] 103  Resp:  [14-22] 14  BP: (110-147)/(71-95) 133/86   Body mass index is 27.47 kg/m².    Physical Exam  Physical Exam  General Appearance:  Alert, cooperative, no distress, appears stated age  Head:  Normocephalic, without obvious abnormality, atraumatic  Eyes:  PERRL, conjunctiva/corneas clear, EOM's intact, fundi benign, bilateral icteric sclera  Ears:  Normal TM's and external ear canals, both ears  Nose: Nares normal, septum midline, mucosa normal, no drainage or sinus tenderness  Throat: Lips, mucosa, and tongue normal; teeth and gums normal  Neck: Supple, symmetrical, trachea midline, no adenopathy, thyroid: not enlarged, symmetric, no tenderness/mass/nodules, no carotid bruit or JVD  Lungs:   Clear to auscultation bilaterally, respirations unlabored  Heart:  Regular rate and rhythm, S1, S2 normal, no murmur, rub or gallop  Abdomen:  Soft, non-tender, bowel sounds active all four quadrants,  no masses, no organomegaly  Extremities: Extremities normal, atraumatic, no cyanosis or edema  Pulses: 2+ and symmetric  Skin: Skin color, texture, turgor normal, no rashes or lesions, jaundiced  Neurologic: Normal      Scheduled Meds   folic acid, 1 mg, Oral, Daily  lactulose, 10 g, Oral, Daily  multivitamin, 1 tablet, Oral, Daily  nicotine, 1 patch, Transdermal, Daily  prednisoLONE  sodium phosphate, 40 mg, Oral, Daily  rifAXIMin, 550 mg, Oral, Q12H  sodium chloride, 10 mL, Intravenous, Q12H  thiamine (B-1) IV, 200 mg, Intravenous, Q8H   Followed by  [START ON 4/21/2024] thiamine, 100 mg, Oral, Daily  zinc sulfate, 220 mg, Oral, Daily       PRN Meds     senna-docusate sodium **AND** polyethylene glycol **AND** bisacodyl **AND** bisacodyl    LORazepam **OR** LORazepam **OR** LORazepam **OR** LORazepam **OR** LORazepam **OR** LORazepam    Magnesium Standard Dose Replacement - Follow Nurse / BPA Driven Protocol    nitroglycerin    ondansetron    Phosphorus Replacement - Follow Nurse / BPA Driven Protocol    Potassium Replacement - Follow Nurse / BPA Driven Protocol    sodium chloride    sodium chloride   Infusions  acetylcysteine (ACETADOTE) 6000 mg in D5W infusion (NON-APAP LIVER FAILURE) Third Dose, 6.25 mg/kg/hr, Last Rate: 6.25 mg/kg/hr (04/18/24 0549)          Diagnostic Data    Results from last 7 days   Lab Units 04/18/24  0437   WBC 10*3/mm3 24.68*   HEMOGLOBIN g/dL 11.0*   HEMATOCRIT % 31.0*   PLATELETS 10*3/mm3 223   GLUCOSE mg/dL 123*   CREATININE mg/dL 0.41*   BUN mg/dL 7   SODIUM mmol/L 135*   POTASSIUM mmol/L 4.0   AST (SGOT) U/L 95*   ALT (SGPT) U/L 54*   ALK PHOS U/L 225*   BILIRUBIN mg/dL 23.2*   ANION GAP mmol/L 12.0       No radiology results for the last day      I reviewed the patient's new clinical results.    Assessment/Plan:     Active and Resolved Problems  Active Hospital Problems    Diagnosis  POA    **Jaundice [R17]  Yes      Resolved Hospital Problems   No resolved problems to display.       Alcohol abuse with acute alcoholic hepatitis  -Patient has daily alcohol use for the last several years  -Presents with significant elevated bilirubin, slightly elevated AST and ALT consistent with alcoholic hepatitis  -Elevated INR consistent with coagulopathy related to acute liver dysfunction  -[facility] discriminant function score of 57.8 suggests patient would benefit from  steroid therapy; he has been initiated on oral methylprednisolone  -Check Lille score on day 7 to assess efficacy of steroid therapy  -Initiated on N-acetylcysteine protocol per GI  -CT of the abdomen/pelvis does not show any acute pathology, although there is evidence of hepatosplenomegaly with mesenteric edema and small ascites all consistent with liver dysfunction  -Bilirubin improving although INR remains elevated but improved after vitamin K administration  -Encourage p.o. intake  -Continue on CIWA protocol for possible alcohol withdrawal with addition of multivitamin, folic acid, zinc, thiamine  -Initiate scheduled Librium to minimize need for as needed Ativan for alcohol withdrawal  -Continue Xifaxan  -Continue lactulose to titrate for 2-3 BM per day  -Patient with leukocytosis although no significant finding consistent with infectious process, given that chest x-ray is normal and urinalysis is not really consistent with UTI; can stop Rocephin    Acute hypophosphatemia  -Replete    Acute hypokalemia  -Replete        DVT prophylaxis:  Mechanical DVT prophylaxis orders are present.         Code status is   Code Status and Medical Interventions:   Ordered at: 04/16/24 0046     Code Status (Patient has no pulse and is not breathing):    CPR (Attempt to Resuscitate)     Medical Interventions (Patient has pulse or is breathing):    Full Support       Plan for disposition: Anticipate patient will discharge home hopefully on 4/20    Time: 30 minutes    Signature: Electronically signed by Austen Montanez MD, 04/18/24, 12:11 EDT.  [facility] Hospitalist Team

## 2024-04-18 NOTE — PROGRESS NOTES
" LOS: 2 days   Patient Care Team:  Provider, No Known as PCP - General      Subjective \"Doing good\"    Interval History:     Subjective: No nausea or vomiting.  Eating well including snacks.  No abdominal pain.  Having bowel movements.      ROS:   No chest pain, shortness of breath, or cough.      Medication Review:     Current Facility-Administered Medications:     [COMPLETED] acetylcysteine (ACETADOTE) 11,380 mg in dextrose (D5W) 5 % 200 mL infusion, 150 mg/kg, Intravenous, Once, Last Rate: 256.9 mL/hr at 04/16/24 0844, 11,380 mg at 04/16/24 0844 **FOLLOWED BY** [COMPLETED] acetylcysteine (ACETADOTE) 3,800 mg in dextrose (D5W) 5 % 500 mL infusion, 50 mg/kg, Intravenous, Once, Last Rate: 125 mL/hr at 04/16/24 1020, 3,800 mg at 04/16/24 1020 **FOLLOWED BY** acetylcysteine (ACETADOTE) 6,000 mg in dextrose (D5W) 5 % 1,000 mL infusion, 6.25 mg/kg/hr, Intravenous, Continuous, Keturah Barrett APRN, Last Rate: 79.1 mL/hr at 04/18/24 0549, 6.25 mg/kg/hr at 04/18/24 0549    sennosides-docusate (PERICOLACE) 8.6-50 MG per tablet 2 tablet, 2 tablet, Oral, BID PRN **AND** polyethylene glycol (MIRALAX) packet 17 g, 17 g, Oral, Daily PRN **AND** bisacodyl (DULCOLAX) EC tablet 5 mg, 5 mg, Oral, Daily PRN **AND** bisacodyl (DULCOLAX) suppository 10 mg, 10 mg, Rectal, Daily PRN, Pitt, Ashlee, APRN    folic acid (FOLVITE) tablet 1 mg, 1 mg, Oral, Daily, Pitt, Ashlee, APRN, 1 mg at 04/18/24 0837    lactulose (CHRONULAC) 10 GM/15ML solution 10 g, 10 g, Oral, Daily, Keturah Barrett APRN, 10 g at 04/18/24 0837    LORazepam (ATIVAN) tablet 1 mg, 1 mg, Oral, Q1H PRN, 1 mg at 04/16/24 9503 **OR** LORazepam (ATIVAN) injection 1 mg, 1 mg, Intravenous, Q1H PRN **OR** LORazepam (ATIVAN) tablet 2 mg, 2 mg, Oral, Q1H PRN **OR** LORazepam (ATIVAN) injection 2 mg, 2 mg, Intravenous, Q1H PRN, 2 mg at 04/18/24 0451 **OR** LORazepam (ATIVAN) injection 2 mg, 2 mg, Intravenous, Q15 Min PRN **OR** LORazepam (ATIVAN) injection 2 mg, 2 mg, " Intramuscular, Q15 Min PRN, Ashlee Pitt APRN    Magnesium Standard Dose Replacement - Follow Nurse / BPA Driven Protocol, , Does not apply, Yoandy ELDER Vanessa, APRN    methylPREDNISolone (MEDROL) tablet 40 mg, 40 mg, Oral, Daily With Breakfast, Austen Montanez MD, 40 mg at 04/18/24 0837    multivitamin (THERAGRAN) tablet 1 tablet, 1 tablet, Oral, Daily, Keturah Barrett APRN, 1 tablet at 04/18/24 0837    nicotine (NICODERM CQ) 21 MG/24HR patch 1 patch, 1 patch, Transdermal, Daily, Ashlee Pitt APRN, 1 patch at 04/18/24 0837    nitroglycerin (NITROSTAT) SL tablet 0.4 mg, 0.4 mg, Sublingual, Q5 Min PRN, Ashlee Pitt APRN    ondansetron (ZOFRAN) injection 4 mg, 4 mg, Intravenous, Q6H PRN, Ashlee Pitt APRN    Phosphorus Replacement - Follow Nurse / BPA Driven Protocol, , Does not apply, Tarik ELDER Tushar, MD    Potassium Replacement - Follow Nurse / BPA Driven Protocol, , Does not apply, Tarik ELDER Tushar, MD    riFAXIMin (XIFAXAN) tablet 550 mg, 550 mg, Oral, Q12H, Keturah Barrett APRN, 550 mg at 04/18/24 0837    sodium chloride 0.9 % flush 10 mL, 10 mL, Intravenous, Q12H, Ashlee Pitt APRN, 10 mL at 04/18/24 0838    sodium chloride 0.9 % flush 10 mL, 10 mL, Intravenous, PRN, Lesley Pitta, APRN    sodium chloride 0.9 % infusion 40 mL, 40 mL, Intravenous, PRN, Ashlee Pitt APRN    thiamine (B-1) injection 200 mg, 200 mg, Intravenous, Q8H, 200 mg at 04/18/24 0922 **FOLLOWED BY** [START ON 4/21/2024] thiamine (VITAMIN B-1) tablet 100 mg, 100 mg, Oral, Daily, Ashlee Pitt APRN    zinc sulfate (ZINCATE) capsule 220 mg, 220 mg, Oral, Daily, Keturah Barrett APRN, 220 mg at 04/18/24 0837      Objective resting in bed, NAD    Vital Signs  Vitals:    04/18/24 0058 04/18/24 0444 04/18/24 0500 04/18/24 0915   BP: 128/81 137/89  133/86   BP Location: Left arm Left arm     Patient Position: Lying Lying     Pulse: 94 91  103   Resp: 19 22  14   Temp: 98.8 °F (37.1 °C) 97.8 °F (36.6 °C)   97.6 °F (36.4 °C)   TempSrc: Axillary Oral  Oral   SpO2: 100% 100%  100%   Weight:   77.2 kg (170 lb 3.1 oz)    Height:           Physical Exam:     General Appearance:    Awake and alert, in no acute distress, somewhat tremulous   Head:    Normocephalic, without obvious abnormality   Eyes:          Conjunctivae normal, icteric sclera   Throat:   No oral lesions, no thrush, oral mucosa moist   Neck:   supple, no JVD   Lungs:     respirations regular, even and unlabored   Abdomen:     Soft, nondistended   Rectal:     Deferred   Extremities:   No edema, no cyanosis   Skin:   No bruising or rash,  jaundice        Results Review:    CBC    Results from last 7 days   Lab Units 04/18/24  0437 04/17/24  0309 04/16/24  1159 04/16/24  0055   WBC 10*3/mm3 24.68* 18.85* 18.46* 22.66*   HEMOGLOBIN g/dL 11.0* 10.3* 10.8* 11.6*   PLATELETS 10*3/mm3 223 179 211 225     CMP   Results from last 7 days   Lab Units 04/18/24  0437 04/17/24  2127 04/17/24  1100 04/17/24  0309 04/16/24  2229 04/16/24  1159 04/16/24  0111 04/16/24  0055   SODIUM mmol/L 135*  --   --  132*  --  134*  --  131*   POTASSIUM mmol/L 4.0 4.5 3.5 3.0*  --  3.1*  --  2.9*   CHLORIDE mmol/L 104  --   --  99  --  99  --  94*   CO2 mmol/L 19.0*  --   --  22.0  --  20.0*  --  22.0   BUN mg/dL 7  --   --  5*  --  3*  --  3*   CREATININE mg/dL 0.41*  --   --  0.31*  --  0.34*  --  0.49*   GLUCOSE mg/dL 123*  --   --  159*  --  185*  --  109*   ALBUMIN g/dL 2.7*  --   --  2.5*  --  2.6*  --  3.3*   BILIRUBIN mg/dL 23.2*  --   --  24.0*  --  25.0*  --  30.2*   ALK PHOS U/L 225*  --   --  188*  --  190*  --  268*   AST (SGOT) U/L 95*  --   --  111*  --  150*  --  203*   ALT (SGPT) U/L 54*  --   --  48*  --  52*  --  65*   MAGNESIUM mg/dL 2.3  --   --  2.0  --  2.0  --  2.0   PHOSPHORUS mg/dL 2.1* 1.8* 1.8*  --  2.1* 2.0*  --  1.8*   AMMONIA umol/L  --   --   --  87*  --  99* 92*  --      Cr Clearance Estimated Creatinine Clearance: 253.2 mL/min (A) (by C-G formula based on  "SCr of 0.41 mg/dL (L)).  Coag   Results from last 7 days   Lab Units 04/18/24  0437 04/17/24  0309 04/16/24  0055   INR  1.79* 1.96* 1.62*     HbA1C No results found for: \"HGBA1C\"      Infection     UA    Results from last 7 days   Lab Units 04/16/24  1635   NITRITE UA  Positive*   WBC UA /HPF 0-2   BACTERIA UA /HPF None Seen   SQUAM EPITHEL UA /HPF 0-2     Microbiology Results (last 10 days)       ** No results found for the last 240 hours. **          Imaging Results (Last 72 Hours)       Procedure Component Value Units Date/Time    XR Chest PA & Lateral [[id number]] Collected: 04/16/24 1012     Updated: 04/16/24 1016    Narrative:      XR CHEST PA AND LATERAL    Date of Exam: 4/16/2024 10:03 AM EDT    Indication: leukocytosis    Comparison: None available.    Findings:  No focal pulmonary consolidations. Pulmonary vascularity appears within normal limits. No pneumothorax or pleural fluid identified. Heart size and mediastinal contour are within normal limits.      Impression:      Impression:  No acute cardiopulmonary findings.      Electronically Signed: Barron Mcdaniel MD    4/16/2024 10:14 AM EDT    Workstation ID: [id number]    CT Abdomen Pelvis Without Contrast [[id number]] Collected: 04/16/24 0217     Updated: 04/16/24 0224    Narrative:      CT ABDOMEN PELVIS WO CONTRAST    Date of Exam: 4/16/2024 1:50 AM EDT    Indication: Liver failure.    Comparison: None available.    Technique: Axial CT images were obtained of the abdomen and pelvis without the administration of contrast. Sagittal and coronal reconstructions were performed.  Automated exposure control and iterative reconstruction methods were used.      Findings:  The lung bases are clear. Distal esophagus is unremarkable. Heart is normal size. No acute findings in the superficial soft tissues. No acute osseous abnormalities or destructive bone lesions. No significant osseous degenerative changes.    The liver is enlarged measuring up to 23 cm craniocaudal. " The liver appears homogeneous. There is gallbladder wall thickening. The bile ducts, pancreas, stomach and adrenal glands appear within normal limits. The spleen is enlarged measuring up to 15.1   cm. Kidneys appear homogeneous. Ureters are nondistended. Urinary bladder is nondistended. The appendix is adjacent to the liver in the right upper quadrant and appears unremarkable. There is mild low-density proximal colon wall thickening and there is   thickening of the wall of the rectum and mid sigmoid colon with adjacent fat stranding. This could be related to colitis or portal hypertensive colopathy. Small bowel is nondistended. There is mild diffuse mesenteric edema. There is a small volume of   perihepatic ascites. No pneumoperitoneum or lymphadenopathy. Aorta is normal diameter.      Impression:      Impression:    1. Hepatosplenomegaly.  2. Mesenteric edema and small volume ascites likely related to portal hypertension.  3. Areas of colonic wall thickening and adjacent edema that might relate to portal hypertensive colopathy or possibly colitis. Correlate with symptoms.            Electronically Signed: Almas Werner MD    4/16/2024 2:22 AM EDT    Workstation ID: [id number]            Assessment & Plan   Elevated LFTs/jaundice  Elevated ammonia  Mild macrocytic anemia  Leukocytosis  Alcohol abuse  Electrolyte abnormalities     PLAN:  Patient is a 34-year-old male who presents with a 6-week history of jaundice in the setting of daily alcohol abuse.  Last drink of alcohol 2 days prior to admission.     Total bilirubin improved to 23.2 from 24.  Alk phos 225, AST 95, ALT 54.  Creatinine 0.4 and sodium 135.  INR 1.79.  Getting Acetadote per protocol.  No evidence of overt GI bleeding.  Hemoglobin 11, .6.  Folate 12.4 with vitamin B12 968.  Iron 94.  WBC 24.68 without bands.  No fevers.  On prednisone.  Remains on multivitamin, folic acid, lactulose, Xifaxan, thiamine and zinc.  Ceftriaxone was stopped after 2  doses.  Monitor white count closely.  Electrolyte replacement per protocol.  Continue supportive care we will follow.  Discussed with patient in detail using  saud at bedside.    Electronically signed by GULSHAN Brantley, 04/18/24, 11:56 AM EDT.

## 2024-04-18 NOTE — NURSING NOTE
Pt's K+ currently 4.5, Phos 1.8, Pt has been receiving frequent K+ replacement. Spoke to pharmacist who advised that the PO Phos has K+ in it, that BPA prompting NA Phos because of K+ being 4.5. Advised to clear PO with provider per pharmacist. Spoke with GULSHAN Pitt regarding Phos replacement. Requested to replace PO v. IV. Ok to give 1x dose of PO phos, if K+ increases & phos remains low/unchanged pt will then need IV replacement. Pharmacist made aware.

## 2024-04-18 NOTE — PLAN OF CARE
Goal Outcome Evaluation:  Plan of Care Reviewed With: patient        Progress: no change  Outcome Evaluation: Patient remains on CIWA and seizure precautions. On acetylcysteine gtt continuous. On room air. Patient unsteady when out of the bed, alarms are on. Patient worked with therapy and his gait was improved after walking short distance to the door. Replacing k phos.

## 2024-04-18 NOTE — THERAPY TREATMENT NOTE
"Subjective: Pt agreeable to therapeutic plan of care.    Objective:   Utilized  via Ipad for communication with patient.  Pt oriented and cooperative.     Bed mobility - Independent  Transfers - CGA  Ambulation - 50 feet CGA, Min-A, and with rolling walker  Standing balance:  initial standing pt with posterior lean, min A to regain balance and reposition feet.  Gait:  initial scissoring and ataxia, wide [id number] with slow gait speed and relying on RW for balance.       Vitals: Tachycardic, 138 HR with walking    Pain: 0 VAS   Location:   Intervention for pain: N/A    Education: Provided education on the importance of mobility in the acute care setting    Assessment: Duglas Candelaria presents with functional mobility impairments which indicate the need for skilled intervention. Pt suffering ETOH w/d, tremulous and unsteady standing and walking putting him at high risk for falls.  At  this time, pt not safe for home alone and recommend SNF for rehab at d/c.  Tolerating session today without incident. Will continue to follow and progress as tolerated.     Plan/Recommendations:   If medically appropriate, Moderate Intensity Therapy recommended post-acute care. This is recommended as therapy feels the patient would require 3-4 days per week and wouldn't tolerate \"3 hour daily\" rehab intensity. SNF would be the preferred choice. If the patient does not agree to SNF, arrange HH or OP depending on home bound status. If patient is medically complex, consider LTACH. Pt requires no DME at discharge.     Pt desires Home at discharge. Pt cooperative; agreeable to therapeutic recommendations and plan of care.         Basic Mobility 6-click:  Rollin = Total, A lot = 2, A little = 3; 4 = None  Supine>Sit:   1 = Total, A lot = 2, A little = 3; 4 = None   Sit>Stand with arms:  1 = Total, A lot = 2, A little = 3; 4 = None  Bed>Chair:   1 = Total, A lot = 2, A little = 3; 4 = None  Ambulate in room:  1 = Total, A lot = " 2, A little = 3; 4 = None  3-5 Steps with railin = Total, A lot = 2, A little = 3; 4 = None  Score: 19    Modified [place]: N/A = No pre-op stroke/TIA    Post-Tx Position: Supine with HOB Elevated, Alarms activated, and Call light and personal items within reach  PPE: gloves and surgical mask

## 2024-04-19 ENCOUNTER — INPATIENT HOSPITAL (OUTPATIENT)
Dept: URBAN - METROPOLITAN AREA HOSPITAL 84 | Facility: HOSPITAL | Age: 34
End: 2024-04-19
Payer: MEDICAID

## 2024-04-19 DIAGNOSIS — R94.5 ABNORMAL RESULTS OF LIVER FUNCTION STUDIES: ICD-10-CM

## 2024-04-19 LAB
ALBUMIN SERPL-MCNC: 2.5 G/DL (ref 3.5–5.2)
ALBUMIN/GLOB SERPL: 0.6 G/DL
ALP SERPL-CCNC: 197 U/L (ref 39–117)
ALT SERPL W P-5'-P-CCNC: 59 U/L (ref 1–41)
ANION GAP SERPL CALCULATED.3IONS-SCNC: 11 MMOL/L (ref 5–15)
AST SERPL-CCNC: 106 U/L (ref 1–40)
BASOPHILS # BLD AUTO: 0.09 10*3/MM3 (ref 0–0.2)
BASOPHILS NFR BLD AUTO: 0.4 % (ref 0–1.5)
BILIRUB SERPL-MCNC: 18.7 MG/DL (ref 0–1.2)
BUN SERPL-MCNC: 7 MG/DL (ref 6–20)
BUN/CREAT SERPL: 19.4 (ref 7–25)
CALCIUM SPEC-SCNC: 8.2 MG/DL (ref 8.6–10.5)
CHLORIDE SERPL-SCNC: 104 MMOL/L (ref 98–107)
CO2 SERPL-SCNC: 21 MMOL/L (ref 22–29)
CREAT SERPL-MCNC: 0.36 MG/DL (ref 0.76–1.27)
DEPRECATED RDW RBC AUTO: 64.7 FL (ref 37–54)
EGFRCR SERPLBLD CKD-EPI 2021: 153.5 ML/MIN/1.73
EOSINOPHIL # BLD AUTO: 0 10*3/MM3 (ref 0–0.4)
EOSINOPHIL NFR BLD AUTO: 0 % (ref 0.3–6.2)
ERYTHROCYTE [DISTWIDTH] IN BLOOD BY AUTOMATED COUNT: 18.2 % (ref 12.3–15.4)
GLOBULIN UR ELPH-MCNC: 4.1 GM/DL
GLUCOSE SERPL-MCNC: 124 MG/DL (ref 65–99)
HCT VFR BLD AUTO: 29.3 % (ref 37.5–51)
HGB BLD-MCNC: 10.4 G/DL (ref 13–17.7)
IMM GRANULOCYTES # BLD AUTO: 1.01 10*3/MM3 (ref 0–0.05)
IMM GRANULOCYTES NFR BLD AUTO: 4.4 % (ref 0–0.5)
INR PPP: 1.5 (ref 0.93–1.1)
LYMPHOCYTES # BLD AUTO: 1.33 10*3/MM3 (ref 0.7–3.1)
LYMPHOCYTES NFR BLD AUTO: 5.7 % (ref 19.6–45.3)
MAGNESIUM SERPL-MCNC: 2.2 MG/DL (ref 1.6–2.6)
MCH RBC QN AUTO: 35.5 PG (ref 26.6–33)
MCHC RBC AUTO-ENTMCNC: 35.5 G/DL (ref 31.5–35.7)
MCV RBC AUTO: 100 FL (ref 79–97)
MONOCYTES # BLD AUTO: 1.42 10*3/MM3 (ref 0.1–0.9)
MONOCYTES NFR BLD AUTO: 6.1 % (ref 5–12)
NEUTROPHILS NFR BLD AUTO: 19.29 10*3/MM3 (ref 1.7–7)
NEUTROPHILS NFR BLD AUTO: 83.4 % (ref 42.7–76)
NRBC BLD AUTO-RTO: 0.2 /100 WBC (ref 0–0.2)
PHOSPHATE SERPL-MCNC: 2.7 MG/DL (ref 2.5–4.5)
PLATELET # BLD AUTO: 262 10*3/MM3 (ref 140–450)
PMV BLD AUTO: 11.6 FL (ref 6–12)
POTASSIUM SERPL-SCNC: 3 MMOL/L (ref 3.5–5.2)
POTASSIUM SERPL-SCNC: 3.9 MMOL/L (ref 3.5–5.2)
PROT SERPL-MCNC: 6.6 G/DL (ref 6–8.5)
PROTHROMBIN TIME: 15.9 SECONDS (ref 9.6–11.7)
RBC # BLD AUTO: 2.93 10*6/MM3 (ref 4.14–5.8)
SODIUM SERPL-SCNC: 136 MMOL/L (ref 136–145)
WBC NRBC COR # BLD AUTO: 23.14 10*3/MM3 (ref 3.4–10.8)
WHOLE BLOOD HOLD COAG: NORMAL
WHOLE BLOOD HOLD SPECIMEN: NORMAL

## 2024-04-19 PROCEDURE — 0 DEXTROSE 5 % SOLUTION 1,000 ML FLEX CONT: Performed by: NURSE PRACTITIONER

## 2024-04-19 PROCEDURE — 97116 GAIT TRAINING THERAPY: CPT

## 2024-04-19 PROCEDURE — 80053 COMPREHEN METABOLIC PANEL: CPT | Performed by: NURSE PRACTITIONER

## 2024-04-19 PROCEDURE — 97530 THERAPEUTIC ACTIVITIES: CPT

## 2024-04-19 PROCEDURE — 25010000002 THIAMINE PER 100 MG: Performed by: NURSE PRACTITIONER

## 2024-04-19 PROCEDURE — 85025 COMPLETE CBC W/AUTO DIFF WBC: CPT | Performed by: NURSE PRACTITIONER

## 2024-04-19 PROCEDURE — 85610 PROTHROMBIN TIME: CPT | Performed by: NURSE PRACTITIONER

## 2024-04-19 PROCEDURE — 97110 THERAPEUTIC EXERCISES: CPT

## 2024-04-19 PROCEDURE — 84100 ASSAY OF PHOSPHORUS: CPT | Performed by: INTERNAL MEDICINE

## 2024-04-19 PROCEDURE — 25010000002 ACETYLCYSTEINE PER 100 MG: Performed by: NURSE PRACTITIONER

## 2024-04-19 PROCEDURE — 97535 SELF CARE MNGMENT TRAINING: CPT

## 2024-04-19 PROCEDURE — 63710000001 PREDNISOLONE PER 5 MG: Performed by: NURSE PRACTITIONER

## 2024-04-19 PROCEDURE — 99232 SBSQ HOSP IP/OBS MODERATE 35: CPT | Performed by: NURSE PRACTITIONER

## 2024-04-19 PROCEDURE — 84132 ASSAY OF SERUM POTASSIUM: CPT | Performed by: INTERNAL MEDICINE

## 2024-04-19 PROCEDURE — 83735 ASSAY OF MAGNESIUM: CPT | Performed by: NURSE PRACTITIONER

## 2024-04-19 RX ORDER — POTASSIUM CHLORIDE 1.5 G/1.58G
40 POWDER, FOR SOLUTION ORAL EVERY 4 HOURS
Status: COMPLETED | OUTPATIENT
Start: 2024-04-19 | End: 2024-04-19

## 2024-04-19 RX ADMIN — THIAMINE HYDROCHLORIDE 200 MG: 100 INJECTION, SOLUTION INTRAMUSCULAR; INTRAVENOUS at 13:27

## 2024-04-19 RX ADMIN — Medication 220 MG: at 08:43

## 2024-04-19 RX ADMIN — LACTULOSE 10 G: 20 SOLUTION ORAL at 08:41

## 2024-04-19 RX ADMIN — RIFAXIMIN 550 MG: 550 TABLET ORAL at 21:08

## 2024-04-19 RX ADMIN — THERA TABS 1 TABLET: TAB at 08:43

## 2024-04-19 RX ADMIN — POTASSIUM CHLORIDE 40 MEQ: 1.5 POWDER, FOR SOLUTION ORAL at 17:33

## 2024-04-19 RX ADMIN — POTASSIUM, SODIUM PHOSPHATES 280 MG-160 MG-250 MG ORAL POWDER PACKET 2 PACKET: POWDER IN PACKET at 00:21

## 2024-04-19 RX ADMIN — PREDNISOLONE SODIUM PHOSPHATE 40 MG: 15 SOLUTION ORAL at 08:42

## 2024-04-19 RX ADMIN — POTASSIUM CHLORIDE 40 MEQ: 1.5 POWDER, FOR SOLUTION ORAL at 12:31

## 2024-04-19 RX ADMIN — FOLIC ACID 1 MG: 1 TABLET ORAL at 08:43

## 2024-04-19 RX ADMIN — THIAMINE HYDROCHLORIDE 200 MG: 100 INJECTION, SOLUTION INTRAMUSCULAR; INTRAVENOUS at 21:08

## 2024-04-19 RX ADMIN — ACETYLCYSTEINE 6.25 MG/KG/HR: 200 INJECTION, SOLUTION INTRAVENOUS at 08:40

## 2024-04-19 RX ADMIN — Medication 10 ML: at 08:46

## 2024-04-19 RX ADMIN — POTASSIUM CHLORIDE 40 MEQ: 1.5 POWDER, FOR SOLUTION ORAL at 08:43

## 2024-04-19 RX ADMIN — CHLORDIAZEPOXIDE HYDROCHLORIDE 25 MG: 25 CAPSULE ORAL at 13:27

## 2024-04-19 RX ADMIN — RIFAXIMIN 550 MG: 550 TABLET ORAL at 08:43

## 2024-04-19 RX ADMIN — CHLORDIAZEPOXIDE HYDROCHLORIDE 25 MG: 25 CAPSULE ORAL at 21:08

## 2024-04-19 RX ADMIN — Medication 10 ML: at 21:08

## 2024-04-19 RX ADMIN — Medication 1 PATCH: at 08:46

## 2024-04-19 RX ADMIN — CHLORDIAZEPOXIDE HYDROCHLORIDE 25 MG: 25 CAPSULE ORAL at 05:20

## 2024-04-19 RX ADMIN — THIAMINE HYDROCHLORIDE 200 MG: 100 INJECTION, SOLUTION INTRAMUSCULAR; INTRAVENOUS at 05:20

## 2024-04-19 NOTE — PLAN OF CARE
Goal Outcome Evaluation:  Plan of Care Reviewed With: patient        Progress: improving       No acute changes overnight. Last CIWA 6

## 2024-04-19 NOTE — THERAPY TREATMENT NOTE
"Subjective: Pt agreeable to therapeutic plan of care.    Objective:   Utilized  via Ipad for communication with patient. Pt oriented and cooperative.     Bed mobility - Independent  Transfers - SBA and with rolling walker  Ambulation - 30 feet ; 60 ft CGA, Min-A, and with rolling walker; pt with continued ataxia and requires assist for balance/Rwx mgmt; SaO2 post >92% with room air.     Therapeutic Exercise - 10 Reps B LE AROM standing with Rwx    Vitals: WNL    Pain: 0 VAS     Intervention for pain: n/a    Education: Provided education on the importance of mobility in the acute care setting, Verbal/Tactile Cues, ADL training, Transfer Training, Gait Training, and Energy conservation strategies    Assessment: Duglas Candelaria presents with functional mobility impairments which indicate the need for skilled intervention. Pt progressed with gait x2 trials with MIN A most of trial, but with straight path, brief improvement to CGA. Tolerating session today without incident. Will continue to follow and progress as tolerated.     Plan/Recommendations:   If medically appropriate, Moderate Intensity Therapy recommended post-acute care. This is recommended as therapy feels the patient would require 3-4 days per week and wouldn't tolerate \"3 hour daily\" rehab intensity. SNF would be the preferred choice. If the patient does not agree to SNF, arrange HH or OP depending on home bound status. If patient is medically complex, consider LTACH. Pt requires rolling walker at discharge.     Pt desires Skilled Rehab placement at discharge. Pt cooperative; agreeable to therapeutic recommendations and plan of care.       Post-Tx Position: Up in Chair, Alarms activated, and Call light and personal items within reach  PPE: gloves and surgical mask   "

## 2024-04-19 NOTE — PLAN OF CARE
"Assessment: Duglas Candelaria presents with functional mobility impairments which indicate the need for skilled intervention. Pt progressed with gait x2 trials with MIN A most of trial, but with straight path, brief improvement to CGA. Tolerating session today without incident. Will continue to follow and progress as tolerated.     Plan/Recommendations:   If medically appropriate, Moderate Intensity Therapy recommended post-acute care. This is recommended as therapy feels the patient would require 3-4 days per week and wouldn't tolerate \"3 hour daily\" rehab intensity. SNF would be the preferred choice. If the patient does not agree to SNF, arrange HH or OP depending on home bound status. If patient is medically complex, consider LTACH. Pt requires rolling walker at discharge.     Pt desires Skilled Rehab placement at discharge. Pt cooperative; agreeable to therapeutic recommendations and plan of care.                                               "

## 2024-04-19 NOTE — CASE MANAGEMENT/SOCIAL WORK
Continued Stay Note  AVERY Lawrence     Patient Name: Duglas Candelaria  MRN: [id number]  Today's Date: 4/19/2024    Admit Date: 4/16/2024    Plan: Anticipate return home alone at d/c. [facility] to be made closer to d/c. Declines SNF   Discharge Plan       Row Name 04/19/24 1803       Plan    Plan Anticipate return home alone at d/c. [facility] PCP apt (Rick) to be made closer to d/c. Declines SNF    Patient/Family in Agreement with Plan yes    Plan Comments Barriers to discharge: Bilirubin improving.  WBC elevated.  Librium.  Acetycysteine drip stopping today.             Expected Discharge Date and Time       Expected Discharge Date Expected Discharge Time    Apr 21, 2024           Martha Bonilla RN     Office Phone [phone]  Office Cell [phone]

## 2024-04-19 NOTE — PROGRESS NOTES
" LOS: 3 days   Patient Care Team:  Provider, No Known as PCP - General      Subjective \" doing okay\"    Interval History: Per bedside RN, he has been somewhat tremulous    Subjective: No nausea or vomiting.  Tolerating diet.  Denies abdominal pain.  Having bowel movements      ROS:   No chest pain, shortness of breath, or cough.        Medication Review:     Current Facility-Administered Medications:     sennosides-docusate (PERICOLACE) 8.6-50 MG per tablet 2 tablet, 2 tablet, Oral, BID PRN **AND** polyethylene glycol (MIRALAX) packet 17 g, 17 g, Oral, Daily PRN **AND** bisacodyl (DULCOLAX) EC tablet 5 mg, 5 mg, Oral, Daily PRN **AND** bisacodyl (DULCOLAX) suppository 10 mg, 10 mg, Rectal, Daily PRN, Ashlee Pitt, APRN    chlordiazePOXIDE (LIBRIUM) capsule 25 mg, 25 mg, Oral, Q8H, Austen Montanez MD, 25 mg at 04/19/24 0520    folic acid (FOLVITE) tablet 1 mg, 1 mg, Oral, Daily, Ashlee Pitt APRN, 1 mg at 04/19/24 0843    lactulose (CHRONULAC) 10 GM/15ML solution 10 g, 10 g, Oral, Daily, Keturah Barrett APRN, 10 g at 04/19/24 0841    LORazepam (ATIVAN) tablet 1 mg, 1 mg, Oral, Q1H PRN, 1 mg at 04/16/24 2243 **OR** LORazepam (ATIVAN) injection 1 mg, 1 mg, Intravenous, Q1H PRN **OR** LORazepam (ATIVAN) tablet 2 mg, 2 mg, Oral, Q1H PRN **OR** LORazepam (ATIVAN) injection 2 mg, 2 mg, Intravenous, Q1H PRN, 2 mg at 04/18/24 5311 **OR** LORazepam (ATIVAN) injection 2 mg, 2 mg, Intravenous, Q15 Min PRN **OR** LORazepam (ATIVAN) injection 2 mg, 2 mg, Intramuscular, Q15 Min PRN, Ashlee Pitt, APRN    Magnesium Standard Dose Replacement - Follow Nurse / BPA Driven Protocol, , Does not apply, PRN, Ashlee Pitt, APRN    multivitamin (THERAGRAN) tablet 1 tablet, 1 tablet, Oral, Daily, Keturah Barrett APRN, 1 tablet at 04/19/24 0843    nicotine (NICODERM CQ) 21 MG/24HR patch 1 patch, 1 patch, Transdermal, Daily, Ashlee Pitt APRN, 1 patch at 04/19/24 0846    nitroglycerin (NITROSTAT) SL tablet 0.4 mg, 0.4 mg, " Sublingual, Q5 Min PRN, Ashlee Pitt APRN    ondansetron (ZOFRAN) injection 4 mg, 4 mg, Intravenous, Q6H PRN, Ashlee Pitt, APRN    Phosphorus Replacement - Follow Nurse / BPA Driven Protocol, , Does not apply, Tarik ELDER Tushar, MD    potassium chloride (KLOR-CON) packet 40 mEq, 40 mEq, Oral, Q4H, Austen Montaenz MD, 40 mEq at 04/19/24 0843    Potassium Replacement - Follow Nurse / BPA Driven Protocol, , Does not apply, Tarik ELDER Tushar, MD    prednisoLONE sodium phosphate (ORAPRED) 15 MG/5ML oral solution 40 mg, 40 mg, Oral, Daily, Keturah Barrett APRN, 40 mg at 04/19/24 0842    riFAXIMin (XIFAXAN) tablet 550 mg, 550 mg, Oral, Q12H, Keturah Barrett APRN, 550 mg at 04/19/24 0843    sodium chloride 0.9 % flush 10 mL, 10 mL, Intravenous, Q12H, Ashlee Pitt, APRN, 10 mL at 04/19/24 0846    sodium chloride 0.9 % flush 10 mL, 10 mL, Intravenous, PRN, Lesley Pitta, APRN    sodium chloride 0.9 % infusion 40 mL, 40 mL, Intravenous, PRN, Lesley Pitta, APRN    thiamine (B-1) injection 200 mg, 200 mg, Intravenous, Q8H, 200 mg at 04/19/24 0520 **FOLLOWED BY** [START ON 4/21/2024] thiamine (VITAMIN B-1) tablet 100 mg, 100 mg, Oral, Daily, Lesley Pitta, APRN    zinc sulfate (ZINCATE) capsule 220 mg, 220 mg, Oral, Daily, Keturah Barrett APRN, 220 mg at 04/19/24 0843      Objective resting in bed, no acute distress, no family present    Vital Signs  Vitals:    04/18/24 2108 04/19/24 0057 04/19/24 0525 04/19/24 0918   BP: 123/74 130/89 135/87 125/85   BP Location: Left arm Left arm Left arm Right arm   Patient Position: Lying Lying Lying Lying   Pulse: 113 98 96 91   Resp: 22 18 20 17   Temp: 98.2 °F (36.8 °C) 97.8 °F (36.6 °C) 97.7 °F (36.5 °C) 97.4 °F (36.3 °C)   TempSrc: Oral Oral Oral Oral   SpO2: 99% 99% 98% 98%   Weight:   78 kg (171 lb 15.3 oz)    Height:           Physical Exam:     General Appearance:    Awake and alert, in no acute distress   Head:    Normocephalic, without obvious  abnormality   Eyes:          Conjunctivae normal, icteric sclera   Throat:   No oral lesions, no thrush, oral mucosa moist   Neck:    supple, no JVD   Lungs:     respirations regular, even and unlabored   Abdomen:     Soft, nondistended   Rectal:     Deferred   Extremities:   no cyanosis   Skin:   No bruising or rash, + jaundice        Results Review:    CBC    Results from last 7 days   Lab Units 04/19/24  0524 04/18/24  0437 04/17/24  0309 04/16/24  1159 04/16/24  0055   WBC 10*3/mm3 23.14* 24.68* 18.85* 18.46* 22.66*   HEMOGLOBIN g/dL 10.4* 11.0* 10.3* 10.8* 11.6*   PLATELETS 10*3/mm3 262 223 179 211 225     CMP   Results from last 7 days   Lab Units 04/19/24  0625 04/19/24  0524 04/18/24  2142 04/18/24  1221 04/18/24  0437 04/17/24  2127 04/17/24  1100 04/17/24  0309 04/16/24  2229 04/16/24  1159 04/16/24  0111 04/16/24  0055   SODIUM mmol/L 136  --   --   --  135*  --   --  132*  --  134*  --  131*   POTASSIUM mmol/L 3.0*  --   --   --  4.0 4.5 3.5 3.0*  --  3.1*  --  2.9*   CHLORIDE mmol/L 104  --   --   --  104  --   --  99  --  99  --  94*   CO2 mmol/L 21.0*  --   --   --  19.0*  --   --  22.0  --  20.0*  --  22.0   BUN mg/dL 7  --   --   --  7  --   --  5*  --  3*  --  3*   CREATININE mg/dL 0.36*  --   --   --  0.41*  --   --  0.31*  --  0.34*  --  0.49*   GLUCOSE mg/dL 124*  --   --   --  123*  --   --  159*  --  185*  --  109*   ALBUMIN g/dL 2.5*  --   --   --  2.7*  --   --  2.5*  --  2.6*  --  3.3*   BILIRUBIN mg/dL 18.7*  --   --   --  23.2*  --   --  24.0*  --  25.0*  --  30.2*   ALK PHOS U/L 197*  --   --   --  225*  --   --  188*  --  190*  --  268*   AST (SGOT) U/L 106*  --   --   --  95*  --   --  111*  --  150*  --  203*   ALT (SGPT) U/L 59*  --   --   --  54*  --   --  48*  --  52*  --  65*   MAGNESIUM mg/dL 2.2  --   --   --  2.3  --   --  2.0  --  2.0  --  2.0   PHOSPHORUS mg/dL  --  2.7 2.1* 2.2* 2.1* 1.8* 1.8*  --  2.1* 2.0*  --  1.8*   AMMONIA umol/L  --   --   --   --   --   --   --  87*   "--  99* 92*  --      Cr Clearance Estimated Creatinine Clearance: 289.6 mL/min (A) (by C-G formula based on SCr of 0.36 mg/dL (L)).  Coag   Results from last 7 days   Lab Units 04/19/24  0524 04/18/24  0437 04/17/24  0309 04/16/24  0055   INR  1.50* 1.79* 1.96* 1.62*     HbA1C No results found for: \"HGBA1C\"      Infection     UA    Results from last 7 days   Lab Units 04/16/24  1635   NITRITE UA  Positive*   WBC UA /HPF 0-2   BACTERIA UA /HPF None Seen   SQUAM EPITHEL UA /HPF 0-2     Microbiology Results (last 10 days)       ** No results found for the last 240 hours. **          Imaging Results (Last 72 Hours)       ** No results found for the last 72 hours. **            Assessment & Plan   Elevated LFTs/jaundice -improving  Elevated ammonia  Mild macrocytic anemia  Leukocytosis  Alcohol abuse  Electrolyte abnormalities     PLAN:  Patient is a 34-year-old male who presents with a 6-week history of jaundice in the setting of daily alcohol abuse.  Last drink of alcohol 2 days prior to admission.     Total bilirubin continues to improve, currently 18.7 from 23.2 yesterday.  INR 1.5 from 1.79.  Creatinine 0.36 with potassium 3.0.  Magnesium and Phos normal.  Electrolyte replacement per primary.  WBC 23.14 from 24.6 yesterday.  Afebrile.  On prednisolone.  Continue lactulose, multivitamin, folic acid, thiamine, Xifaxan and zinc.  Complete alcohol cessation again encouraged.  Bedside RN reports that case management is following for rehab resources.  Continue supportive care.  If he continues to improve, consider discharge home tomorrow if no significant withdrawal symptoms.  Little more for GI to offer at this time, call questions or concerns.    Electronically signed by GULSHAN Brantley, 04/19/24, 10:38 AM EDT.          "

## 2024-04-19 NOTE — PROGRESS NOTES
[facility] MEDICINE SERVICE  DAILY PROGRESS NOTE    NAME: Duglas Candelaria  : 1990  MRN: [id number]      LOS: 3 days     PROVIDER OF SERVICE: Austen Montanez MD    Chief Complaint: Jaundice    Subjective:     Interval History: Patient continues to feel well, denies any withdrawal symptoms.  He denies any further diarrhea.  He has been tolerating p.o. intake very well.    Review of Systems:   Review of Systems    Objective:     Vital Signs  Temp:  [97.4 °F (36.3 °C)-98.2 °F (36.8 °C)] 97.6 °F (36.4 °C)  Heart Rate:  [] 105  Resp:  [16-29] 29  BP: (123-136)/(74-94) 136/93   Body mass index is 27.75 kg/m².    Physical Exam  Physical Exam  General Appearance:  Alert, cooperative, no distress, appears stated age  Head:  Normocephalic, without obvious abnormality, atraumatic  Eyes:  PERRL, conjunctiva/corneas clear, EOM's intact, fundi benign, bilateral icteric sclera  Ears:  Normal TM's and external ear canals, both ears  Nose: Nares normal, septum midline, mucosa normal, no drainage or sinus tenderness  Throat: Lips, mucosa, and tongue normal; teeth and gums normal  Neck: Supple, symmetrical, trachea midline, no adenopathy, thyroid: not enlarged, symmetric, no tenderness/mass/nodules, no carotid bruit or JVD  Lungs:   Clear to auscultation bilaterally, respirations unlabored  Heart:  Regular rate and rhythm, S1, S2 normal, no murmur, rub or gallop  Abdomen:  Soft, non-tender, bowel sounds active all four quadrants,  no masses, no organomegaly  Extremities: Extremities normal, atraumatic, no cyanosis or edema  Pulses: 2+ and symmetric  Skin: Skin color, texture, turgor normal, no rashes or lesions, jaundiced  Neurologic: Normal      Scheduled Meds   chlordiazePOXIDE, 25 mg, Oral, Q8H  folic acid, 1 mg, Oral, Daily  lactulose, 10 g, Oral, Daily  multivitamin, 1 tablet, Oral, Daily  nicotine, 1 patch, Transdermal, Daily  potassium chloride, 40 mEq, Oral, Q4H  prednisoLONE sodium phosphate, 40 mg, Oral,  Daily  rifAXIMin, 550 mg, Oral, Q12H  sodium chloride, 10 mL, Intravenous, Q12H  thiamine (B-1) IV, 200 mg, Intravenous, Q8H   Followed by  [START ON 4/21/2024] thiamine, 100 mg, Oral, Daily  zinc sulfate, 220 mg, Oral, Daily       PRN Meds     senna-docusate sodium **AND** polyethylene glycol **AND** bisacodyl **AND** bisacodyl    LORazepam **OR** LORazepam **OR** LORazepam **OR** LORazepam **OR** LORazepam **OR** LORazepam    Magnesium Standard Dose Replacement - Follow Nurse / BPA Driven Protocol    nitroglycerin    ondansetron    Phosphorus Replacement - Follow Nurse / BPA Driven Protocol    Potassium Replacement - Follow Nurse / BPA Driven Protocol    sodium chloride    sodium chloride   Infusions           Diagnostic Data    Results from last 7 days   Lab Units 04/19/24  0625 04/19/24  0524   WBC 10*3/mm3  --  23.14*   HEMOGLOBIN g/dL  --  10.4*   HEMATOCRIT %  --  29.3*   PLATELETS 10*3/mm3  --  262   GLUCOSE mg/dL 124*  --    CREATININE mg/dL 0.36*  --    BUN mg/dL 7  --    SODIUM mmol/L 136  --    POTASSIUM mmol/L 3.0*  --    AST (SGOT) U/L 106*  --    ALT (SGPT) U/L 59*  --    ALK PHOS U/L 197*  --    BILIRUBIN mg/dL 18.7*  --    ANION GAP mmol/L 11.0  --        No radiology results for the last day      I reviewed the patient's new clinical results.    Assessment/Plan:     Active and Resolved Problems  Active Hospital Problems    Diagnosis  POA    **Jaundice [R17]  Yes      Resolved Hospital Problems   No resolved problems to display.       Alcohol abuse with acute alcoholic hepatitis  -Patient has daily alcohol use for the last several years  -Presents with significant elevated bilirubin, slightly elevated AST and ALT consistent with alcoholic hepatitis  -Elevated INR consistent with coagulopathy related to acute liver dysfunction  -Maddrey discriminant function score of 57.8 suggests patient would benefit from steroid therapy; he has been initiated on oral methylprednisolone  -Check Lille score on day 7  to assess efficacy of steroid therapy, although patient will likely be discharged before that but he is clear improvement over the first few days of treatment with steroids  -Initiated on N-acetylcysteine protocol per GI  -CT of the abdomen/pelvis does not show any acute pathology, although there is evidence of hepatosplenomegaly with mesenteric edema and small ascites all consistent with liver dysfunction  -Bilirubin improving although INR remains elevated but improved after vitamin K administration  -Continue to encourage p.o. intake  -Continue on CIWA protocol for possible alcohol withdrawal with addition of multivitamin, folic acid, zinc, thiamine  -Initiate scheduled Librium to minimize need for as needed Ativan for alcohol withdrawal; patient has had significant improvement in his withdrawal symptoms with Librium  -Taper Librium over the next couple of days  -Continue Xifaxan  -Continue lactulose to titrate for 2-3 BM per day  -Patient with leukocytosis although no significant finding consistent with infectious process, given that chest x-ray is normal and urinalysis is not really consistent with UTI; can stop Rocephin  -Patient will need 4 weeks total of methylprednisolone on discharge, with plan to taper doses over the last 2 weeks    Acute hypophosphatemia  -Replete    Acute hypokalemia  -Replete        DVT prophylaxis:  Mechanical DVT prophylaxis orders are present.         Code status is   Code Status and Medical Interventions:   Ordered at: 04/16/24 0046     Code Status (Patient has no pulse and is not breathing):    CPR (Attempt to Resuscitate)     Medical Interventions (Patient has pulse or is breathing):    Full Support       Plan for disposition: Anticipate patient will discharge home on 4/20    Time: 30 minutes    Signature: Electronically signed by Austen Montanez MD, 04/19/24, 13:19 EDT.  [facility], [place], operated by [facility] Hospitalist Team

## 2024-04-19 NOTE — PLAN OF CARE
Goal Outcome Evaluation:  Plan of Care Reviewed With: patient        Progress: no change  Outcome Evaluation: Patient received medications today. Acetylcysteine gtt was completed this morning. Patient receiving potassium replacement, labs tonight for recheck at 2007. Patient continued on CIWA protocol, scoring lower today and patient mentation seems improved from previous days. Patient is on room air.

## 2024-04-20 LAB
ALBUMIN SERPL-MCNC: 2.5 G/DL (ref 3.5–5.2)
ALBUMIN/GLOB SERPL: 0.7 G/DL
ALP SERPL-CCNC: 210 U/L (ref 39–117)
ALT SERPL W P-5'-P-CCNC: 73 U/L (ref 1–41)
ANION GAP SERPL CALCULATED.3IONS-SCNC: 10 MMOL/L (ref 5–15)
AST SERPL-CCNC: 144 U/L (ref 1–40)
BASOPHILS # BLD AUTO: 0.15 10*3/MM3 (ref 0–0.2)
BASOPHILS NFR BLD AUTO: 0.5 % (ref 0–1.5)
BILIRUB SERPL-MCNC: 17.5 MG/DL (ref 0–1.2)
BUN SERPL-MCNC: 10 MG/DL (ref 6–20)
BUN/CREAT SERPL: 23.8 (ref 7–25)
CALCIUM SPEC-SCNC: 8.1 MG/DL (ref 8.6–10.5)
CHLORIDE SERPL-SCNC: 104 MMOL/L (ref 98–107)
CO2 SERPL-SCNC: 21 MMOL/L (ref 22–29)
CREAT SERPL-MCNC: 0.42 MG/DL (ref 0.76–1.27)
DEPRECATED RDW RBC AUTO: 65.8 FL (ref 37–54)
EGFRCR SERPLBLD CKD-EPI 2021: 146.5 ML/MIN/1.73
EOSINOPHIL # BLD AUTO: 0.03 10*3/MM3 (ref 0–0.4)
EOSINOPHIL NFR BLD AUTO: 0.1 % (ref 0.3–6.2)
ERYTHROCYTE [DISTWIDTH] IN BLOOD BY AUTOMATED COUNT: 18.5 % (ref 12.3–15.4)
GLOBULIN UR ELPH-MCNC: 3.8 GM/DL
GLUCOSE SERPL-MCNC: 92 MG/DL (ref 65–99)
HCT VFR BLD AUTO: 30.1 % (ref 37.5–51)
HGB BLD-MCNC: 10.8 G/DL (ref 13–17.7)
IMM GRANULOCYTES # BLD AUTO: 1.8 10*3/MM3 (ref 0–0.05)
IMM GRANULOCYTES NFR BLD AUTO: 6.5 % (ref 0–0.5)
INR PPP: 1.42 (ref 0.93–1.1)
LYMPHOCYTES # BLD AUTO: 2.23 10*3/MM3 (ref 0.7–3.1)
LYMPHOCYTES NFR BLD AUTO: 8 % (ref 19.6–45.3)
MAGNESIUM SERPL-MCNC: 2.2 MG/DL (ref 1.6–2.6)
MCH RBC QN AUTO: 36 PG (ref 26.6–33)
MCHC RBC AUTO-ENTMCNC: 35.9 G/DL (ref 31.5–35.7)
MCV RBC AUTO: 100.3 FL (ref 79–97)
MONOCYTES # BLD AUTO: 2.15 10*3/MM3 (ref 0.1–0.9)
MONOCYTES NFR BLD AUTO: 7.8 % (ref 5–12)
NEUTROPHILS NFR BLD AUTO: 21.37 10*3/MM3 (ref 1.7–7)
NEUTROPHILS NFR BLD AUTO: 77.1 % (ref 42.7–76)
NRBC BLD AUTO-RTO: 0.3 /100 WBC (ref 0–0.2)
PLATELET # BLD AUTO: 259 10*3/MM3 (ref 140–450)
PMV BLD AUTO: 10.8 FL (ref 6–12)
POTASSIUM SERPL-SCNC: 3.5 MMOL/L (ref 3.5–5.2)
POTASSIUM SERPL-SCNC: 4.1 MMOL/L (ref 3.5–5.2)
PROCALCITONIN SERPL-MCNC: 0.17 NG/ML (ref 0–0.25)
PROT SERPL-MCNC: 6.3 G/DL (ref 6–8.5)
PROTHROMBIN TIME: 15.1 SECONDS (ref 9.6–11.7)
RBC # BLD AUTO: 3 10*6/MM3 (ref 4.14–5.8)
SODIUM SERPL-SCNC: 135 MMOL/L (ref 136–145)
WBC NRBC COR # BLD AUTO: 27.73 10*3/MM3 (ref 3.4–10.8)

## 2024-04-20 PROCEDURE — 84145 PROCALCITONIN (PCT): CPT | Performed by: INTERNAL MEDICINE

## 2024-04-20 PROCEDURE — 25010000002 THIAMINE PER 100 MG: Performed by: NURSE PRACTITIONER

## 2024-04-20 PROCEDURE — 63710000001 PREDNISOLONE PER 5 MG: Performed by: NURSE PRACTITIONER

## 2024-04-20 PROCEDURE — 85025 COMPLETE CBC W/AUTO DIFF WBC: CPT | Performed by: NURSE PRACTITIONER

## 2024-04-20 PROCEDURE — 83735 ASSAY OF MAGNESIUM: CPT | Performed by: NURSE PRACTITIONER

## 2024-04-20 PROCEDURE — 85610 PROTHROMBIN TIME: CPT | Performed by: NURSE PRACTITIONER

## 2024-04-20 PROCEDURE — 80053 COMPREHEN METABOLIC PANEL: CPT | Performed by: NURSE PRACTITIONER

## 2024-04-20 PROCEDURE — 84132 ASSAY OF SERUM POTASSIUM: CPT | Performed by: INTERNAL MEDICINE

## 2024-04-20 RX ORDER — POTASSIUM CHLORIDE 1.5 G/1.58G
40 POWDER, FOR SOLUTION ORAL EVERY 4 HOURS
Status: COMPLETED | OUTPATIENT
Start: 2024-04-20 | End: 2024-04-20

## 2024-04-20 RX ADMIN — THIAMINE HYDROCHLORIDE 200 MG: 100 INJECTION, SOLUTION INTRAMUSCULAR; INTRAVENOUS at 21:19

## 2024-04-20 RX ADMIN — POTASSIUM CHLORIDE 40 MEQ: 1.5 POWDER, FOR SOLUTION ORAL at 09:09

## 2024-04-20 RX ADMIN — THIAMINE HYDROCHLORIDE 200 MG: 100 INJECTION, SOLUTION INTRAMUSCULAR; INTRAVENOUS at 14:52

## 2024-04-20 RX ADMIN — CHLORDIAZEPOXIDE HYDROCHLORIDE 25 MG: 25 CAPSULE ORAL at 06:03

## 2024-04-20 RX ADMIN — RIFAXIMIN 550 MG: 550 TABLET ORAL at 21:19

## 2024-04-20 RX ADMIN — RIFAXIMIN 550 MG: 550 TABLET ORAL at 09:09

## 2024-04-20 RX ADMIN — CHLORDIAZEPOXIDE HYDROCHLORIDE 25 MG: 25 CAPSULE ORAL at 14:52

## 2024-04-20 RX ADMIN — FOLIC ACID 1 MG: 1 TABLET ORAL at 09:09

## 2024-04-20 RX ADMIN — THERA TABS 1 TABLET: TAB at 09:09

## 2024-04-20 RX ADMIN — Medication 10 ML: at 21:19

## 2024-04-20 RX ADMIN — CHLORDIAZEPOXIDE HYDROCHLORIDE 25 MG: 25 CAPSULE ORAL at 21:19

## 2024-04-20 RX ADMIN — Medication 1 PATCH: at 09:11

## 2024-04-20 RX ADMIN — Medication 10 ML: at 09:11

## 2024-04-20 RX ADMIN — Medication 220 MG: at 09:09

## 2024-04-20 RX ADMIN — PREDNISOLONE SODIUM PHOSPHATE 40 MG: 15 SOLUTION ORAL at 09:10

## 2024-04-20 RX ADMIN — LACTULOSE 10 G: 20 SOLUTION ORAL at 09:09

## 2024-04-20 RX ADMIN — POTASSIUM CHLORIDE 40 MEQ: 1.5 POWDER, FOR SOLUTION ORAL at 06:03

## 2024-04-20 RX ADMIN — THIAMINE HYDROCHLORIDE 200 MG: 100 INJECTION, SOLUTION INTRAMUSCULAR; INTRAVENOUS at 06:03

## 2024-04-20 NOTE — PLAN OF CARE
Goal Outcome Evaluation:         Pt eating well, watching TV and awake, vitals stable,

## 2024-04-20 NOTE — PLAN OF CARE
Goal Outcome Evaluation:         VSS, room air, patient resting comfortably during this shift. CIWA score of 1 this shift, no reported nausea. Patient is a standby assist now, mostly independent aside from slight weakness. No drips running.

## 2024-04-20 NOTE — PROGRESS NOTES
[facility] MEDICINE SERVICE  DAILY PROGRESS NOTE    NAME: Duglas Candelaria  : 1990  MRN: [id number]      LOS: 4 days     PROVIDER OF SERVICE: Vikc Molina MD    Chief Complaint: Jaundice    Subjective:         History of Present illness      Duglas Candelaria is a 34 y.o. male with no significant medial presented to the hospital as a transfer from [facility] and was admitted with a principal diagnosis of Jaundice.      Patient is German-speaking and history has been obtained through tele-   Patient reports he presented for treatment because skin and eyes were yellow in color.  Denies any chest pain, shortness of breath, edema, nausea or vomiting or change in bowel habits.  Possibly some slight diarrhea.  Patient reports he is positive for tobacco use and consumes approximately 11-12 beers a day that he is done over the past several years.  Over the past 5 to 6 weeks he had noticed a change in skin color and eye color.  Mild cough.  Patient reports that has had symptoms of withdrawal in the past     Patient arrived noted a disc was sent but no printed report of labs or imaging.  Was reported per charge nurse that facility had reported was attempting to get records from another outside facility where patient had labs drawn.  Get CT of abdomen pelvis and repeat labs.  Consult GI in place on CIWA protocol      Interval History:   24-Patient continues to feel well, denies any withdrawal symptoms.  He denies any further diarrhea.  He has been tolerating p.o. intake very well.  24 seen in bed no new complaints, VSS. No withdrawal sx, WBC higher 27, afebrile      Subjective / Review of systems      Review of Systems   Skin:  Positive for color change.   All other systems reviewed and are negative.     Objective:     Vital Signs  Temp:  [97.4 °F (36.3 °C)-98.4 °F (36.9 °C)] 97.8 °F (36.6 °C)  Heart Rate:  [] 93  Resp:  [13-23] 23  BP: (108-133)/(69-85) 129/83   Body mass  index is 26.94 kg/m².    Physical Exam  General Appearance:  Alert, cooperative, no distress, appears stated age  Head:  Normocephalic, without obvious abnormality, atraumatic  Eyes:  PERRL, conjunctiva/corneas clear, EOM's intact, fundi benign, bilateral icteric sclera  Ears:  Normal TM's and external ear canals, both ears  Nose: Nares normal, septum midline, mucosa normal, no drainage or sinus tenderness  Throat: Lips, mucosa, and tongue normal; teeth and gums normal  Neck: Supple, symmetrical, trachea midline, no adenopathy, thyroid: not enlarged, symmetric, no tenderness/mass/nodules, no carotid bruit or JVD  Lungs:   Clear to auscultation bilaterally, respirations unlabored  Heart:  Regular rate and rhythm, S1, S2 normal, no murmur, rub or gallop  Abdomen:  Soft, non-tender, bowel sounds active all four quadrants,  no masses, no organomegaly  Extremities: Extremities normal, atraumatic, no cyanosis or edema  Pulses: 2+ and symmetric  Skin: Skin color, texture, turgor normal, no rashes or lesions, jaundiced  Neurologic: Normal      Scheduled Meds   chlordiazePOXIDE, 25 mg, Oral, Q8H  folic acid, 1 mg, Oral, Daily  lactulose, 10 g, Oral, Daily  multivitamin, 1 tablet, Oral, Daily  nicotine, 1 patch, Transdermal, Daily  prednisoLONE sodium phosphate, 40 mg, Oral, Daily  rifAXIMin, 550 mg, Oral, Q12H  sodium chloride, 10 mL, Intravenous, Q12H  thiamine (B-1) IV, 200 mg, Intravenous, Q8H   Followed by  [START ON 4/21/2024] thiamine, 100 mg, Oral, Daily  zinc sulfate, 220 mg, Oral, Daily       PRN Meds     senna-docusate sodium **AND** polyethylene glycol **AND** bisacodyl **AND** bisacodyl    LORazepam **OR** LORazepam **OR** LORazepam **OR** LORazepam **OR** LORazepam **OR** LORazepam    Magnesium Standard Dose Replacement - Follow Nurse / BPA Driven Protocol    nitroglycerin    ondansetron    Phosphorus Replacement - Follow Nurse / BPA Driven Protocol    Potassium Replacement - Follow Nurse / BPA Driven Protocol     sodium chloride    sodium chloride   Infusions           Diagnostic Data    Results from last 7 days   Lab Units 04/20/24  0303   WBC 10*3/mm3 27.73*   HEMOGLOBIN g/dL 10.8*   HEMATOCRIT % 30.1*   PLATELETS 10*3/mm3 259   GLUCOSE mg/dL 92   CREATININE mg/dL 0.42*   BUN mg/dL 10   SODIUM mmol/L 135*   POTASSIUM mmol/L 3.5   AST (SGOT) U/L 144*   ALT (SGPT) U/L 73*   ALK PHOS U/L 210*   BILIRUBIN mg/dL 17.5*   ANION GAP mmol/L 10.0       No radiology results for the last day      I reviewed the patient's new clinical results.    Assessment/Plan:     Active and Resolved Problems  Active Hospital Problems    Diagnosis  POA    **Jaundice [R17]  Yes      Resolved Hospital Problems   No resolved problems to display.       Alcohol abuse with acute alcoholic hepatitis  -Patient has daily alcohol use for the last several years  -Presents with significant elevated bilirubin, slightly elevated AST and ALT consistent with alcoholic hepatitis  -Elevated INR consistent with coagulopathy related to acute liver dysfunction  -Maddrey discriminant function score of 57.8 suggests patient would benefit from steroid therapy; he has been initiated on oral methylprednisolone  -Check Lille score on day 7 to assess efficacy of steroid therapy, although patient will likely be discharged before that but he is clear improvement over the first few days of treatment with steroids  -Initiated on N-acetylcysteine protocol per GI  -CT of the abdomen/pelvis does not show any acute pathology, although there is evidence of hepatosplenomegaly with mesenteric edema and small ascites all consistent with liver dysfunction  -Bilirubin improving although INR remains elevated but improved after vitamin K administration  -Continue to encourage p.o. intake  -Continue on CIWA protocol for possible alcohol withdrawal with addition of multivitamin, folic acid, zinc, thiamine  -Initiate scheduled Librium to minimize need for as needed Ativan for alcohol  withdrawal; patient has had significant improvement in his withdrawal symptoms with Librium  -Taper Librium over the next couple of days  -Continue Xifaxan  -Continue lactulose to titrate for 2-3 BM per day  -Patient with leukocytosis although no significant finding consistent with infectious process, given that chest x-ray is normal and urinalysis is not really consistent with UTI; can stop Rocephin  -Patient will need 4 weeks total of methylprednisolone on discharge, with plan to taper doses over the last 2 weeks    Acute hypophosphatemia  -Replete    Acute hypokalemia  -Replete    DVT prophylaxis:  Mechanical DVT prophylaxis orders are present.      Code status is   Code Status and Medical Interventions:   Ordered at: 04/16/24 0046     Code Status (Patient has no pulse and is not breathing):    CPR (Attempt to Resuscitate)     Medical Interventions (Patient has pulse or is breathing):    Full Support       Plan for disposition: Anticipate patient will discharge home on 4/20    Time: 30 minutes    Signature: Electronically signed by Vick Molina MD, 04/20/24, 14:03 EDT.  [facility] Hospitalist Team

## 2024-04-21 LAB
ALBUMIN SERPL-MCNC: 2.7 G/DL (ref 3.5–5.2)
ALBUMIN/GLOB SERPL: 0.7 G/DL
ALP SERPL-CCNC: 223 U/L (ref 39–117)
ALT SERPL W P-5'-P-CCNC: 86 U/L (ref 1–41)
ANION GAP SERPL CALCULATED.3IONS-SCNC: 12 MMOL/L (ref 5–15)
AST SERPL-CCNC: 168 U/L (ref 1–40)
BASOPHILS # BLD AUTO: 0.12 10*3/MM3 (ref 0–0.2)
BASOPHILS NFR BLD AUTO: 0.4 % (ref 0–1.5)
BILIRUB SERPL-MCNC: 17.3 MG/DL (ref 0–1.2)
BUN SERPL-MCNC: 11 MG/DL (ref 6–20)
BUN/CREAT SERPL: 28.2 (ref 7–25)
CALCIUM SPEC-SCNC: 8.1 MG/DL (ref 8.6–10.5)
CHLORIDE SERPL-SCNC: 103 MMOL/L (ref 98–107)
CO2 SERPL-SCNC: 19 MMOL/L (ref 22–29)
CREAT SERPL-MCNC: 0.39 MG/DL (ref 0.76–1.27)
DEPRECATED RDW RBC AUTO: 71.7 FL (ref 37–54)
EGFRCR SERPLBLD CKD-EPI 2021: 149.8 ML/MIN/1.73
EOSINOPHIL # BLD AUTO: 0.02 10*3/MM3 (ref 0–0.4)
EOSINOPHIL NFR BLD AUTO: 0.1 % (ref 0.3–6.2)
ERYTHROCYTE [DISTWIDTH] IN BLOOD BY AUTOMATED COUNT: 19.2 % (ref 12.3–15.4)
GLOBULIN UR ELPH-MCNC: 3.8 GM/DL
GLUCOSE SERPL-MCNC: 120 MG/DL (ref 65–99)
HCT VFR BLD AUTO: 31.1 % (ref 37.5–51)
HGB BLD-MCNC: 10.9 G/DL (ref 13–17.7)
IMM GRANULOCYTES # BLD AUTO: 1.67 10*3/MM3 (ref 0–0.05)
IMM GRANULOCYTES NFR BLD AUTO: 6.2 % (ref 0–0.5)
LYMPHOCYTES # BLD AUTO: 1.41 10*3/MM3 (ref 0.7–3.1)
LYMPHOCYTES NFR BLD AUTO: 5.2 % (ref 19.6–45.3)
MAGNESIUM SERPL-MCNC: 2.2 MG/DL (ref 1.6–2.6)
MCH RBC QN AUTO: 36 PG (ref 26.6–33)
MCHC RBC AUTO-ENTMCNC: 35 G/DL (ref 31.5–35.7)
MCV RBC AUTO: 102.6 FL (ref 79–97)
MONOCYTES # BLD AUTO: 1.57 10*3/MM3 (ref 0.1–0.9)
MONOCYTES NFR BLD AUTO: 5.8 % (ref 5–12)
NEUTROPHILS NFR BLD AUTO: 22.26 10*3/MM3 (ref 1.7–7)
NEUTROPHILS NFR BLD AUTO: 82.3 % (ref 42.7–76)
NRBC BLD AUTO-RTO: 0.1 /100 WBC (ref 0–0.2)
PLATELET # BLD AUTO: 244 10*3/MM3 (ref 140–450)
PMV BLD AUTO: 10.7 FL (ref 6–12)
POTASSIUM SERPL-SCNC: 3.6 MMOL/L (ref 3.5–5.2)
POTASSIUM SERPL-SCNC: 4.1 MMOL/L (ref 3.5–5.2)
PROT SERPL-MCNC: 6.5 G/DL (ref 6–8.5)
RBC # BLD AUTO: 3.03 10*6/MM3 (ref 4.14–5.8)
SODIUM SERPL-SCNC: 134 MMOL/L (ref 136–145)
WBC NRBC COR # BLD AUTO: 27.05 10*3/MM3 (ref 3.4–10.8)

## 2024-04-21 PROCEDURE — 80053 COMPREHEN METABOLIC PANEL: CPT | Performed by: NURSE PRACTITIONER

## 2024-04-21 PROCEDURE — 63710000001 PREDNISOLONE PER 5 MG: Performed by: NURSE PRACTITIONER

## 2024-04-21 PROCEDURE — 85025 COMPLETE CBC W/AUTO DIFF WBC: CPT | Performed by: NURSE PRACTITIONER

## 2024-04-21 PROCEDURE — 84132 ASSAY OF SERUM POTASSIUM: CPT | Performed by: INTERNAL MEDICINE

## 2024-04-21 PROCEDURE — 83735 ASSAY OF MAGNESIUM: CPT | Performed by: NURSE PRACTITIONER

## 2024-04-21 RX ORDER — POTASSIUM CHLORIDE 1.5 G/1.58G
40 POWDER, FOR SOLUTION ORAL EVERY 4 HOURS
Status: DISCONTINUED | OUTPATIENT
Start: 2024-04-21 | End: 2024-04-21 | Stop reason: SDUPTHER

## 2024-04-21 RX ORDER — POTASSIUM CHLORIDE 1.5 G/1.58G
40 POWDER, FOR SOLUTION ORAL EVERY 4 HOURS
Status: COMPLETED | OUTPATIENT
Start: 2024-04-21 | End: 2024-04-21

## 2024-04-21 RX ADMIN — RIFAXIMIN 550 MG: 550 TABLET ORAL at 20:44

## 2024-04-21 RX ADMIN — CHLORDIAZEPOXIDE HYDROCHLORIDE 25 MG: 25 CAPSULE ORAL at 13:19

## 2024-04-21 RX ADMIN — THERA TABS 1 TABLET: TAB at 10:04

## 2024-04-21 RX ADMIN — POTASSIUM CHLORIDE 40 MEQ: 1.5 POWDER, FOR SOLUTION ORAL at 10:04

## 2024-04-21 RX ADMIN — FOLIC ACID 1 MG: 1 TABLET ORAL at 10:04

## 2024-04-21 RX ADMIN — RIFAXIMIN 550 MG: 550 TABLET ORAL at 10:04

## 2024-04-21 RX ADMIN — POTASSIUM CHLORIDE 40 MEQ: 1.5 POWDER, FOR SOLUTION ORAL at 05:26

## 2024-04-21 RX ADMIN — Medication 100 MG: at 10:03

## 2024-04-21 RX ADMIN — CHLORDIAZEPOXIDE HYDROCHLORIDE 25 MG: 25 CAPSULE ORAL at 20:44

## 2024-04-21 RX ADMIN — Medication 1 PATCH: at 10:08

## 2024-04-21 RX ADMIN — CHLORDIAZEPOXIDE HYDROCHLORIDE 25 MG: 25 CAPSULE ORAL at 05:27

## 2024-04-21 RX ADMIN — Medication 220 MG: at 10:03

## 2024-04-21 RX ADMIN — Medication 10 ML: at 10:05

## 2024-04-21 RX ADMIN — LACTULOSE 10 G: 20 SOLUTION ORAL at 10:04

## 2024-04-21 RX ADMIN — Medication 10 ML: at 20:44

## 2024-04-21 RX ADMIN — PREDNISOLONE SODIUM PHOSPHATE 40 MG: 15 SOLUTION ORAL at 10:04

## 2024-04-21 NOTE — PROGRESS NOTES
[facility] MEDICINE SERVICE  DAILY PROGRESS NOTE    NAME: Duglas Candelaria  : 1990  MRN: [id number]      LOS: 5 days     PROVIDER OF SERVICE: Vick Molina MD    Chief Complaint: Jaundice    Subjective:         History of Present illness      Duglas Candelaria is a 34 y.o. male with no significant medial presented to the hospital as a transfer from [facility] and was admitted with a principal diagnosis of Jaundice.      Patient is German-speaking and history has been obtained through tele-   Patient reports he presented for treatment because skin and eyes were yellow in color.  Denies any chest pain, shortness of breath, edema, nausea or vomiting or change in bowel habits.  Possibly some slight diarrhea.  Patient reports he is positive for tobacco use and consumes approximately 11-12 beers a day that he is done over the past several years.  Over the past 5 to 6 weeks he had noticed a change in skin color and eye color.  Mild cough.  Patient reports that has had symptoms of withdrawal in the past     Patient arrived noted a disc was sent but no printed report of labs or imaging.  Was reported per charge nurse that facility had reported was attempting to get records from another outside facility where patient had labs drawn.  Get CT of abdomen pelvis and repeat labs.  Consult GI in place on CIWA protocol      Interval History:   24-Patient continues to feel well, denies any withdrawal symptoms.  He denies any further diarrhea.  He has been tolerating p.o. intake very well.  24 seen in bed no new complaints, VSS. No withdrawal sx, WBC higher 27, afebrile  24 and examined in bed no acute distress, blood pressure stable, heart rate 106, temperature 97.3.    Subjective / Review of systems      Review of Systems   Skin:  Positive for color change.   All other systems reviewed and are negative.     Objective:     Vital Signs  Temp:  [97.3 °F (36.3 °C)-98.4 °F (36.9 °C)] 97.3  °F (36.3 °C)  Heart Rate:  [] 106  Resp:  [18-27] 18  BP: (108-140)/(70-89) 123/74   Body mass index is 27.04 kg/m².    Physical Exam  General Appearance:  Alert, cooperative, no distress, appears stated age  Head:  Normocephalic, without obvious abnormality, atraumatic  Eyes:  PERRL, conjunctiva/corneas clear, EOM's intact, fundi benign, bilateral icteric sclera  Ears:  Normal TM's and external ear canals, both ears  Nose: Nares normal, septum midline, mucosa normal, no drainage or sinus tenderness  Throat: Lips, mucosa, and tongue normal; teeth and gums normal  Neck: Supple, symmetrical, trachea midline, no adenopathy, thyroid: not enlarged, symmetric, no tenderness/mass/nodules, no carotid bruit or JVD  Lungs:   Clear to auscultation bilaterally, respirations unlabored  Heart:  Regular rate and rhythm, S1, S2 normal, no murmur, rub or gallop  Abdomen:  Soft, non-tender, bowel sounds active all four quadrants,  no masses, no organomegaly  Extremities: Extremities normal, atraumatic, no cyanosis or edema  Pulses: 2+ and symmetric  Skin: Skin color, texture, turgor normal, no rashes or lesions, jaundiced  Neurologic: Normal      Scheduled Meds   chlordiazePOXIDE, 25 mg, Oral, Q8H  folic acid, 1 mg, Oral, Daily  lactulose, 10 g, Oral, Daily  multivitamin, 1 tablet, Oral, Daily  nicotine, 1 patch, Transdermal, Daily  potassium chloride, 40 mEq, Oral, Q4H  prednisoLONE sodium phosphate, 40 mg, Oral, Daily  rifAXIMin, 550 mg, Oral, Q12H  sodium chloride, 10 mL, Intravenous, Q12H  thiamine, 100 mg, Oral, Daily  zinc sulfate, 220 mg, Oral, Daily       PRN Meds     senna-docusate sodium **AND** polyethylene glycol **AND** bisacodyl **AND** bisacodyl    Magnesium Standard Dose Replacement - Follow Nurse / BPA Driven Protocol    nitroglycerin    ondansetron    Phosphorus Replacement - Follow Nurse / BPA Driven Protocol    Potassium Replacement - Follow Nurse / BPA Driven Protocol    sodium chloride    sodium chloride    Infusions           Diagnostic Data    Results from last 7 days   Lab Units 04/21/24  0140   WBC 10*3/mm3 27.05*   HEMOGLOBIN g/dL 10.9*   HEMATOCRIT % 31.1*   PLATELETS 10*3/mm3 244   GLUCOSE mg/dL 120*   CREATININE mg/dL 0.39*   BUN mg/dL 11   SODIUM mmol/L 134*   POTASSIUM mmol/L 3.6   AST (SGOT) U/L 168*   ALT (SGPT) U/L 86*   ALK PHOS U/L 223*   BILIRUBIN mg/dL 17.3*   ANION GAP mmol/L 12.0       No radiology results for the last day      I reviewed the patient's new clinical results.    Assessment/Plan:     Active and Resolved Problems  Active Hospital Problems    Diagnosis  POA    **Jaundice [R17]  Yes      Resolved Hospital Problems   No resolved problems to display.       Alcohol abuse with acute alcoholic hepatitis  -Patient has daily alcohol use for the last several years  -Presents with significant elevated bilirubin, slightly elevated AST and ALT consistent with alcoholic hepatitis  -Elevated INR consistent with coagulopathy related to acute liver dysfunction  -Maddrey discriminant function score of 57.8 suggests patient would benefit from steroid therapy; he has been initiated on oral methylprednisolone  -Check Lille score on day 7 to assess efficacy of steroid therapy, although patient will likely be discharged before that but he is clear improvement over the first few days of treatment with steroids  -Initiated on N-acetylcysteine protocol per GI  -CT of the abdomen/pelvis does not show any acute pathology, although there is evidence of hepatosplenomegaly with mesenteric edema and small ascites all consistent with liver dysfunction  -Bilirubin improving although INR remains elevated but improved after vitamin K administration  -Continue to encourage p.o. intake  -Continue on CIWA protocol for possible alcohol withdrawal with addition of multivitamin, folic acid, zinc, thiamine  -Initiate scheduled Librium to minimize need for as needed Ativan for alcohol withdrawal; patient has had significant  improvement in his withdrawal symptoms with Librium  -Taper Librium over the next couple of days  -Continue Xifaxan  -Continue lactulose to titrate for 2-3 BM per day  -Patient with leukocytosis although no significant finding consistent with infectious process, given that chest x-ray is normal and urinalysis is not really consistent with UTI; -  -off Rocephin  -Patient will need 4 weeks total of methylprednisolone on discharge, with plan to taper doses over the last 2 weeks    Acute hypophosphatemia  -Replete    Acute hypokalemia  -Replete    DVT prophylaxis:  Mechanical DVT prophylaxis orders are present.      Code status is   Code Status and Medical Interventions:   Ordered at: 04/16/24 0046     Code Status (Patient has no pulse and is not breathing):    CPR (Attempt to Resuscitate)     Medical Interventions (Patient has pulse or is breathing):    Full Support       Plan for disposition: Anticipate patient will discharge home on 4/20    Time: 30 minutes    Signature: Electronically signed by Vick Molina MD, 04/21/24, 09:19 EDT.  [facility]ist Team

## 2024-04-21 NOTE — PLAN OF CARE
Goal Outcome Evaluation:      Pt's CIWA is negative, eating well, sleeping between care. Vitals stable.

## 2024-04-21 NOTE — CONSULTS
Infectious Diseases Consult Note    Referring Provider: Vick Molina MD    Reason for Consultation: Leukocytosis    Patient Care Team:  Provider, No Known as PCP - General    Chief complaint jaundice    Subjective     The patient has been afebrile for the last 24 hours.  The patient is on room air, hemodynamically stable    History of present illness:      This is a 34-year-old male presents to the hospital on 4/16/2023 from [facility] due to worsening jaundice that he had noticed over the last 6 weeks.  Patient is a heavy tobacco and alcohol user.  Been seen by GI and started on steroids and a acetylcysteine drip.  Currently denies fever, chills, shortness of breath, GI symptoms or urinary symptoms.  RN reports minimal diarrhea due to lactulose    Review of Systems   Review of Systems   Constitutional:  Positive for fatigue.   HENT: Negative.     Eyes: Negative.    Respiratory: Negative.     Cardiovascular: Negative.    Gastrointestinal: Negative.    Endocrine: Negative.    Genitourinary: Negative.    Musculoskeletal: Negative.    Skin:  Positive for color change.   Neurological: Negative.    Psychiatric/Behavioral: Negative.     All other systems reviewed and are negative.      Medications  No medications prior to admission.       History  History reviewed. No pertinent past medical history.  History reviewed. No pertinent surgical history.    Family History  History reviewed. No pertinent family history.    Social History   reports that he has been smoking cigarettes. He has been exposed to tobacco smoke. He does not have any smokeless tobacco history on file. He reports current alcohol use. He reports that he does not use drugs.    Allergies  Patient has no known allergies.    Objective     Vital Signs   Vital Signs (last 24 hours)         04/20 0700  04/21 0659 04/21 0700  04/21 1449   Most Recent      Temp (°F) 97.4 -  98.4    97.3 -  97.5     97.5 (36.4) 04/21 1300    Heart Rate 84 -  112     100 -  106     100 04/21 1300    Resp 20 -  27    18 -  20     20 04/21 1300    /72 -  140/89    122/79 -  123/74     122/79 04/21 1300    SpO2 (%) 98 -  100    90 -  100     100 04/21 1300            Physical Exam:  Physical Exam  Vitals and nursing note reviewed.   Constitutional:       General: He is not in acute distress.     Appearance: He is well-developed and normal weight. He is ill-appearing. He is not diaphoretic.   HENT:      Head: Normocephalic and atraumatic.   Eyes:      General: Scleral icterus present.      Conjunctiva/sclera: Conjunctivae normal.      Pupils: Pupils are equal, round, and reactive to light.   Cardiovascular:      Rate and Rhythm: Normal rate and regular rhythm.      Heart sounds: Normal heart sounds, S1 normal and S2 normal.   Pulmonary:      Effort: Pulmonary effort is normal. No respiratory distress.      Breath sounds: Normal breath sounds. No stridor. No wheezing or rales.   Abdominal:      General: Bowel sounds are normal. There is no distension.      Palpations: Abdomen is soft. There is no mass.      Tenderness: There is no abdominal tenderness. There is no guarding.   Musculoskeletal:         General: No deformity. Normal range of motion.      Cervical back: Neck supple.   Skin:     General: Skin is warm and dry.      Coloration: Skin is jaundiced. Skin is not pale.      Findings: No erythema or rash.   Neurological:      Mental Status: He is alert and oriented to person, place, and time.      Cranial Nerves: No cranial nerve deficit.   Psychiatric:         Mood and Affect: Mood normal.         Microbiology  Microbiology Results (last 10 days)       ** No results found for the last 240 hours. **            Laboratory  Results from last 7 days   Lab Units 04/21/24  0140   WBC 10*3/mm3 27.05*   HEMOGLOBIN g/dL 10.9*   HEMATOCRIT % 31.1*   PLATELETS 10*3/mm3 244     Results from last 7 days   Lab Units 04/21/24  0140   SODIUM mmol/L 134*   POTASSIUM mmol/L 3.6   CHLORIDE  mmol/L 103   CO2 mmol/L 19.0*   BUN mg/dL 11   CREATININE mg/dL 0.39*   GLUCOSE mg/dL 120*   CALCIUM mg/dL 8.1*     Results from last 7 days   Lab Units 04/21/24  0140   SODIUM mmol/L 134*   POTASSIUM mmol/L 3.6   CHLORIDE mmol/L 103   CO2 mmol/L 19.0*   BUN mg/dL 11   CREATININE mg/dL 0.39*   GLUCOSE mg/dL 120*   CALCIUM mg/dL 8.1*                   Radiology  Imaging Results (Last 72 Hours)       ** No results found for the last 72 hours. **            Cardiology      Results Review:  I have reviewed all clinical data, test, lab, and imaging results.       Schedule Meds  chlordiazePOXIDE, 25 mg, Oral, Q8H  folic acid, 1 mg, Oral, Daily  lactulose, 10 g, Oral, Daily  multivitamin, 1 tablet, Oral, Daily  nicotine, 1 patch, Transdermal, Daily  prednisoLONE sodium phosphate, 40 mg, Oral, Daily  rifAXIMin, 550 mg, Oral, Q12H  sodium chloride, 10 mL, Intravenous, Q12H  thiamine, 100 mg, Oral, Daily  zinc sulfate, 220 mg, Oral, Daily        Infusion Meds       PRN Meds    senna-docusate sodium **AND** polyethylene glycol **AND** bisacodyl **AND** bisacodyl    Magnesium Standard Dose Replacement - Follow Nurse / BPA Driven Protocol    nitroglycerin    ondansetron    Phosphorus Replacement - Follow Nurse / BPA Driven Protocol    Potassium Replacement - Follow Nurse / BPA Driven Protocol    sodium chloride    sodium chloride      Assessment & Plan       Assessment    Leukocytosis-likely reactive to liver disease and steroid use.  Patient denies a history of leukocytosis.  Chest x-ray shows no signs of infiltrates and CT is consistent with the sequela of liver disease.  Urinalysis was unremarkable.  No noted hepatic encephalopathy to be concerned with aspiration pneumonia    Alcoholic liver disease due to EtOH abuse.  Patient is jaundiced with elevated liver transaminase and hyperbilirubinemia.  Hepatitis panel was negative.  GI following.      Tobacco abuse    Plan    Monitor off antimicrobial therapy  Continue supportive  care  Alcohol and tobacco abuse cessation  Case discussed with RN  Not much more to add from infectious disease standpoint-we will sign off at this time-please call with any questions.    Ananya Nascimento, APRN  04/21/24  14:49 EDT    Note is dictated utilizing voice recognition software/Dragon

## 2024-04-21 NOTE — PLAN OF CARE
Goal Outcome Evaluation:      VSS, room air, liver enzymes and WBC still elevated, CIWA score 0. Patient resting comfortably this shift, no new complaints or complications.

## 2024-04-22 VITALS
OXYGEN SATURATION: 100 % | HEART RATE: 104 BPM | TEMPERATURE: 98.1 F | BODY MASS INDEX: 27.42 KG/M2 | HEIGHT: 66 IN | WEIGHT: 170.64 LBS | RESPIRATION RATE: 20 BRPM | SYSTOLIC BLOOD PRESSURE: 115 MMHG | DIASTOLIC BLOOD PRESSURE: 79 MMHG

## 2024-04-22 PROBLEM — F10.10 ETOH ABUSE: Status: ACTIVE | Noted: 2024-04-22

## 2024-04-22 LAB
ALBUMIN SERPL-MCNC: 2.6 G/DL (ref 3.5–5.2)
ALBUMIN/GLOB SERPL: 0.7 G/DL
ALP SERPL-CCNC: 228 U/L (ref 39–117)
ALT SERPL W P-5'-P-CCNC: 86 U/L (ref 1–41)
ANION GAP SERPL CALCULATED.3IONS-SCNC: 11 MMOL/L (ref 5–15)
AST SERPL-CCNC: 138 U/L (ref 1–40)
BASOPHILS # BLD AUTO: 0.09 10*3/MM3 (ref 0–0.2)
BASOPHILS NFR BLD AUTO: 0.3 % (ref 0–1.5)
BILIRUB SERPL-MCNC: 15.7 MG/DL (ref 0–1.2)
BUN SERPL-MCNC: 12 MG/DL (ref 6–20)
BUN/CREAT SERPL: 27.3 (ref 7–25)
CALCIUM SPEC-SCNC: 8.2 MG/DL (ref 8.6–10.5)
CHLORIDE SERPL-SCNC: 103 MMOL/L (ref 98–107)
CO2 SERPL-SCNC: 20 MMOL/L (ref 22–29)
CREAT SERPL-MCNC: 0.44 MG/DL (ref 0.76–1.27)
DEPRECATED RDW RBC AUTO: 69.9 FL (ref 37–54)
EGFRCR SERPLBLD CKD-EPI 2021: 144.4 ML/MIN/1.73
EOSINOPHIL # BLD AUTO: 0.04 10*3/MM3 (ref 0–0.4)
EOSINOPHIL NFR BLD AUTO: 0.2 % (ref 0.3–6.2)
ERYTHROCYTE [DISTWIDTH] IN BLOOD BY AUTOMATED COUNT: 19.2 % (ref 12.3–15.4)
GLOBULIN UR ELPH-MCNC: 3.7 GM/DL
GLUCOSE SERPL-MCNC: 110 MG/DL (ref 65–99)
HCT VFR BLD AUTO: 30.8 % (ref 37.5–51)
HGB BLD-MCNC: 10.9 G/DL (ref 13–17.7)
IMM GRANULOCYTES # BLD AUTO: 1.26 10*3/MM3 (ref 0–0.05)
IMM GRANULOCYTES NFR BLD AUTO: 4.9 % (ref 0–0.5)
LYMPHOCYTES # BLD AUTO: 1.23 10*3/MM3 (ref 0.7–3.1)
LYMPHOCYTES NFR BLD AUTO: 4.8 % (ref 19.6–45.3)
MAGNESIUM SERPL-MCNC: 2.1 MG/DL (ref 1.6–2.6)
MCH RBC QN AUTO: 35.9 PG (ref 26.6–33)
MCHC RBC AUTO-ENTMCNC: 35.4 G/DL (ref 31.5–35.7)
MCV RBC AUTO: 101.3 FL (ref 79–97)
MONOCYTES # BLD AUTO: 1.37 10*3/MM3 (ref 0.1–0.9)
MONOCYTES NFR BLD AUTO: 5.3 % (ref 5–12)
NEUTROPHILS NFR BLD AUTO: 21.84 10*3/MM3 (ref 1.7–7)
NEUTROPHILS NFR BLD AUTO: 84.5 % (ref 42.7–76)
NRBC BLD AUTO-RTO: 0.1 /100 WBC (ref 0–0.2)
PLATELET # BLD AUTO: 228 10*3/MM3 (ref 140–450)
PMV BLD AUTO: 10.8 FL (ref 6–12)
POTASSIUM SERPL-SCNC: 3.5 MMOL/L (ref 3.5–5.2)
PROT SERPL-MCNC: 6.3 G/DL (ref 6–8.5)
RBC # BLD AUTO: 3.04 10*6/MM3 (ref 4.14–5.8)
SODIUM SERPL-SCNC: 134 MMOL/L (ref 136–145)
WBC NRBC COR # BLD AUTO: 25.83 10*3/MM3 (ref 3.4–10.8)

## 2024-04-22 PROCEDURE — 97116 GAIT TRAINING THERAPY: CPT

## 2024-04-22 PROCEDURE — 85025 COMPLETE CBC W/AUTO DIFF WBC: CPT | Performed by: NURSE PRACTITIONER

## 2024-04-22 PROCEDURE — 80053 COMPREHEN METABOLIC PANEL: CPT | Performed by: NURSE PRACTITIONER

## 2024-04-22 PROCEDURE — 63710000001 PREDNISOLONE PER 5 MG: Performed by: NURSE PRACTITIONER

## 2024-04-22 PROCEDURE — 83735 ASSAY OF MAGNESIUM: CPT | Performed by: NURSE PRACTITIONER

## 2024-04-22 RX ORDER — PREDNISONE 10 MG/1
TABLET ORAL
Qty: 42 TABLET | Refills: 0 | Status: SHIPPED | OUTPATIENT
Start: 2024-04-23 | End: 2024-05-13

## 2024-04-22 RX ORDER — CHLORDIAZEPOXIDE HYDROCHLORIDE 25 MG/1
25 CAPSULE, GELATIN COATED ORAL EVERY 8 HOURS SCHEDULED
Qty: 7 CAPSULE | Refills: 0 | Status: SHIPPED | OUTPATIENT
Start: 2024-04-22 | End: 2024-04-25

## 2024-04-22 RX ORDER — BISACODYL 10 MG
10 SUPPOSITORY, RECTAL RECTAL DAILY PRN
Qty: 30 EACH | Refills: 0 | Status: SHIPPED | OUTPATIENT
Start: 2024-04-22

## 2024-04-22 RX ORDER — POLYETHYLENE GLYCOL 3350 17 G/17G
17 POWDER, FOR SOLUTION ORAL DAILY PRN
Qty: 510 G | Refills: 0 | Status: SHIPPED | OUTPATIENT
Start: 2024-04-22

## 2024-04-22 RX ORDER — NICOTINE 21 MG/24HR
1 PATCH, TRANSDERMAL 24 HOURS TRANSDERMAL DAILY
Qty: 30 EACH | Refills: 0 | Status: SHIPPED | OUTPATIENT
Start: 2024-04-23

## 2024-04-22 RX ORDER — GAUZE BANDAGE 2" X 2"
100 BANDAGE TOPICAL DAILY
Qty: 30 TABLET | Refills: 0 | Status: SHIPPED | OUTPATIENT
Start: 2024-04-23

## 2024-04-22 RX ORDER — ZINC SULFATE 50(220)MG
220 CAPSULE ORAL DAILY
Qty: 30 CAPSULE | Refills: 0 | Status: SHIPPED | OUTPATIENT
Start: 2024-04-23

## 2024-04-22 RX ORDER — POTASSIUM CHLORIDE 20 MEQ/1
40 TABLET, EXTENDED RELEASE ORAL EVERY 4 HOURS
Status: DISCONTINUED | OUTPATIENT
Start: 2024-04-22 | End: 2024-04-22 | Stop reason: HOSPADM

## 2024-04-22 RX ORDER — LACTULOSE 10 G/15ML
10 SOLUTION ORAL DAILY
Qty: 473 ML | Refills: 3 | Status: SHIPPED | OUTPATIENT
Start: 2024-04-23

## 2024-04-22 RX ORDER — FOLIC ACID 1 MG/1
1 TABLET ORAL DAILY
Qty: 30 TABLET | Refills: 0 | Status: SHIPPED | OUTPATIENT
Start: 2024-04-23

## 2024-04-22 RX ADMIN — FOLIC ACID 1 MG: 1 TABLET ORAL at 08:05

## 2024-04-22 RX ADMIN — CHLORDIAZEPOXIDE HYDROCHLORIDE 25 MG: 25 CAPSULE ORAL at 05:57

## 2024-04-22 RX ADMIN — RIFAXIMIN 550 MG: 550 TABLET ORAL at 08:05

## 2024-04-22 RX ADMIN — Medication 1 PATCH: at 08:06

## 2024-04-22 RX ADMIN — Medication 100 MG: at 08:05

## 2024-04-22 RX ADMIN — Medication 10 ML: at 08:07

## 2024-04-22 RX ADMIN — Medication 220 MG: at 08:05

## 2024-04-22 RX ADMIN — LACTULOSE 10 G: 20 SOLUTION ORAL at 08:04

## 2024-04-22 RX ADMIN — THERA TABS 1 TABLET: TAB at 08:04

## 2024-04-22 RX ADMIN — PREDNISOLONE SODIUM PHOSPHATE 40 MG: 15 SOLUTION ORAL at 08:05

## 2024-04-22 RX ADMIN — POTASSIUM CHLORIDE 40 MEQ: 1500 TABLET, EXTENDED RELEASE ORAL at 08:04

## 2024-04-22 NOTE — PLAN OF CARE
Goal Outcome Evaluation:  Plan of Care Reviewed With: patient     Duglas Candelaria presents with functional mobility impairments which indicate the need for skilled intervention. Tolerating session today without incident. Pt demonstrated significant improvement with mobility this date, demonstrated independence with bed mobility/transfers and required CGA for gait training 200 ft x 2 with no AD, still some mild ataxia noted but with no LOB. Anticipate pt will be safe to d/c home with assist from family/friends as needed.       Luzmaria Reid, PT, DPT,

## 2024-04-22 NOTE — PAYOR COMM NOTE
"This is discharge notification for Duglas Candelaria   Reference/Auth # [id number]   Pt discharged routine to home on 24.    HENNA Ortez, RN, CCM  Utilization Review Nurse  [facility]  Direct & confidential phone # [phone]  Fax # [phone]      Duglas Candelaria (34 y.o. Male)       Date of Birth   1990    Social Security Number       Address   [address] [place] [address]    Home Phone   [phone]    MRN   [id number]       Hinduism   None    Marital Status   Single                            Admission Date   24    Admission Type   Urgent    Admitting Provider       Attending Provider       Department, Room/Bed   [facility] PROGRESS CARE,        Discharge Date   2024    Discharge Disposition   Home or Self Care    Discharge Destination                                 Attending Provider: (none)   Allergies: No Known Allergies    Isolation: None   Infection: None   Code Status: CPR    Ht: 167.6 cm (66\")   Wt: 77.4 kg (170 lb 10.2 oz)    Admission Cmt: None   Principal Problem: Jaundice [R17]                   Active Insurance as of 2024       Primary Coverage       Payor Plan Insurance Group Employer/Plan Group    [place]        Payor Plan Address Payor Plan Phone Number Payor Plan Fax Number Effective Dates    [address]   4/15/2024 - None Entered    [place] [address]         Subscriber Name Subscriber Birth Date Member ID       DUGLAS CANDELARIA 1990 [id number]                     Emergency Contacts        (Rel.) Home Phone Work Phone Mobile Phone    BARRIE DIAZ (Friend) -- -- [phone]    CARMENZA SCALES (Friend) -- -- [phone]                 Discharge Summary        Vick Molina MD at 24 0852                       [facility] Medicine Services  Discharge Summary    Date of Service: 24  Patient Name: Duglas Candelaria  : 1990  MRN: [id number]    Date of " Admission: 4/16/2024  Discharge Diagnosis: Alcohol abuse with acute alcoholic hepatitis  Date of Discharge:  4/22/24  Primary Care Physician: Provider, No Known      Presenting Problem:   Jaundice [R17]    Active and Resolved Hospital Problems:  Active Hospital Problems    Diagnosis POA    **Jaundice [R17] Yes    ETOH abuse [F10.10] Unknown      Resolved Hospital Problems   No resolved problems to display.     Alcohol abuse with acute alcoholic hepatitis  -Patient has daily alcohol use for the last several years  -Presents with significant elevated bilirubin, slightly elevated AST and ALT consistent with alcoholic hepatitis  -Elevated INR consistent with coagulopathy related to acute liver dysfunction  -Maddrey discriminant function score of 57.8 suggests patient would benefit from steroid therapy; he has been initiated on oral methylprednisolone  -Check Lille score on day 7 to assess efficacy of steroid therapy, although patient will likely be discharged before that but he is clear improvement over the first few days of treatment with steroids  -Initiated on N-acetylcysteine protocol per GI  -CT of the abdomen/pelvis does not show any acute pathology, although there is evidence of hepatosplenomegaly with mesenteric edema and small ascites all consistent with liver dysfunction  -Bilirubin improving although INR remains elevated but improved after vitamin K administration  -Continue to encourage p.o. intake  -Continue on CIWA protocol for possible alcohol withdrawal with addition of multivitamin, folic acid, zinc, thiamine  -Initiate scheduled Librium to minimize need for as needed Ativan for alcohol withdrawal; patient has had significant improvement in his withdrawal symptoms with Librium  -Taper Librium over the next couple of days  -Continue Xifaxan  -Continue lactulose to titrate for 2-3 BM per day  -Patient with leukocytosis although no significant finding consistent with infectious process, given that chest  x-ray is normal and urinalysis is not really consistent with UTI; -  -off Rocephin  -Patient will need 4 weeks total of methylprednisolone on discharge, with plan to taper doses over the last 2 weeks     Acute hypophosphatemia  -Replete     Acute hypokalemia  -Replete    Hospital Course     HPI: Duglas Candelaria is a 34 y.o. male with no significant medial presented to the hospital as a transfer from [facility] and was admitted with a principal diagnosis of Jaundice.      Patient is Urdu-speaking and history has been obtained through tele-   Patient reports he presented for treatment because skin and eyes were yellow in color.  Denies any chest pain, shortness of breath, edema, nausea or vomiting or change in bowel habits.  Possibly some slight diarrhea.  Patient reports he is positive for tobacco use and consumes approximately 11-12 beers a day that he is done over the past several years.  Over the past 5 to 6 weeks he had noticed a change in skin color and eye color.  Mild cough.  Patient reports that has had symptoms of withdrawal in the past     Patient arrived noted a disc was sent but no printed report of labs or imaging.  Was reported per charge nurse that facility had reported was attempting to get records from another outside facility where patient had labs drawn.  Get CT of abdomen pelvis and repeat labs.  Consult GI in place on CIWA protocol      Interval History:   4/19/24-Patient continues to feel well, denies any withdrawal symptoms.  He denies any further diarrhea.  He has been tolerating p.o. intake very well.  4/20/24 seen in bed no new complaints, VSS. No withdrawal sx, WBC higher 27, afebrile  4/21/24 and examined in bed no acute distress, blood pressure stable, heart rate 106, temperature 97.3.  4/22/2024 patient seen and examined doing better today, she to go home.  Vital signs stable, will DC patient home.  Condition at discharge stable.  Discussed with RN.  DISCHARGE Follow  Up Recommendations for labs and diagnostics: Follow-up with PCP in a week.      Reasons For Change In Medications and Indications for New Medications:   NORMA pizarro:  bisacodyl (DULCOLAX)   chlordiazePOXIDE (LIBRIUM)   ClearLax (polyethylene glycol)   folic acid (FOLVITE)   Nicotine Step 1 (nicotine)   predniSONE (DELTASONE)   Thiamine Mononitrate   Xifaxan (riFAXIMin)   zinc sulfate (ZINCATE)         Lactulose  Day of Discharge     Vital Signs:  Temp:  [97.5 °F (36.4 °C)-98.1 °F (36.7 °C)] 98.1 °F (36.7 °C)  Heart Rate:  [] 104  Resp:  [15-20] 20  BP: (106-125)/(71-79) 115/79      Physical Exam   General Appearance:  Alert, cooperative, no distress, appears stated age  Head:   Normocephalic, without obvious abnormality, atraumatic  Eyes:   PERRL, conjunctiva/corneas clear, EOM's intact, fundi benign, bilateral icteric sclera  Ears:    Normal TM's and external ear canals, both ears  Nose:Nares normal, septum midline, mucosa normal, no drainage or sinus tenderness  Throat:Lips, mucosa, and tongue normal; teeth and gums normal  Neck:Supple, symmetrical, trachea midline, no adenopathy, thyroid: not enlarged, symmetric, no tenderness/mass/nodules, no carotid bruit or JVD  Lungs:             Clear to auscultation bilaterally, respirations unlabored  Heart:  Regular rate and rhythm, S1, S2 normal, no murmur, rub or gallop  Abdomen:  Soft, non-tender, bowel sounds active all four quadrants,  no masses, no organomegaly  Extremities:Extremities normal, atraumatic, no cyanosis or edema  Pulses:2+ and symmetric  Skin:Skin color, texture, turgor normal, no rashes or lesions, jaundiced      Pertinent  and/or Most Recent Results     LAB RESULTS:      Lab 04/22/24  0313 04/21/24  0140 04/20/24  0303 04/19/24  0524 04/18/24  0437 04/17/24  0309 04/16/24  1159 04/16/24  0055   WBC 25.83* 27.05* 27.73* 23.14* 24.68* 18.85*   < > 22.66*   HEMOGLOBIN 10.9* 10.9* 10.8* 10.4* 11.0* 10.3*   < > 11.6*   HEMATOCRIT 30.8* 31.1*  30.1* 29.3* 31.0* 28.5*   < > 32.3*   PLATELETS 228 244 259 262 223 179   < > 225   NEUTROS ABS 21.84* 22.26* 21.37* 19.29* 21.73* 17.10*   < > 18.59*   IMMATURE GRANS (ABS) 1.26* 1.67* 1.80* 1.01* 0.76* 0.25*   < > 0.29*   LYMPHS ABS 1.23 1.41 2.23 1.33 1.20 0.77   < > 1.80   MONOS ABS 1.37* 1.57* 2.15* 1.42* 0.95* 0.70   < > 1.61*   EOS ABS 0.04 0.02 0.03 0.00 0.00 0.00   < > 0.29   .3* 102.6* 100.3* 100.0* 100.6* 98.6*   < > 98.2*   PROCALCITONIN  --   --  0.17  --   --   --   --   --    PROTIME  --   --  15.1* 15.9* 18.7* 20.3*  --  17.0*    < > = values in this interval not displayed.         Lab 04/22/24  0313 04/21/24  1555 04/21/24  0140 04/20/24  1501 04/20/24  0303 04/19/24  2031 04/19/24  0625 04/19/24  0524 04/18/24  2142 04/18/24  1221 04/18/24  0437 04/17/24 2127   SODIUM 134*  --  134*  --  135*  --  136  --   --   --  135*  --    POTASSIUM 3.5 4.1 3.6 4.1 3.5   < > 3.0*  --   --   --  4.0 4.5   CHLORIDE 103  --  103  --  104  --  104  --   --   --  104  --    CO2 20.0*  --  19.0*  --  21.0*  --  21.0*  --   --   --  19.0*  --    ANION GAP 11.0  --  12.0  --  10.0  --  11.0  --   --   --  12.0  --    BUN 12  --  11  --  10  --  7  --   --   --  7  --    CREATININE 0.44*  --  0.39*  --  0.42*  --  0.36*  --   --   --  0.41*  --    EGFR 144.4  --  149.8  --  146.5  --  153.5  --   --   --  147.6  --    GLUCOSE 110*  --  120*  --  92  --  124*  --   --   --  123*  --    CALCIUM 8.2*  --  8.1*  --  8.1*  --  8.2*  --   --   --  8.6  --    MAGNESIUM 2.1  --  2.2  --  2.2  --  2.2  --   --   --  2.3  --    PHOSPHORUS  --   --   --   --   --   --   --  2.7 2.1* 2.2* 2.1* 1.8*    < > = values in this interval not displayed.         Lab 04/22/24  0313 04/21/24  0140 04/20/24  0303 04/19/24  0625 04/18/24  0437   TOTAL PROTEIN 6.3 6.5 6.3 6.6 7.0   ALBUMIN 2.6* 2.7* 2.5* 2.5* 2.7*   GLOBULIN 3.7 3.8 3.8 4.1 4.3   ALT (SGPT) 86* 86* 73* 59* 54*   AST (SGOT) 138* 168* 144* 106* 95*   BILIRUBIN 15.7* 17.3*  17.5* 18.7* 23.2*   ALK PHOS 228* 223* 210* 197* 225*         Lab 04/20/24  0303 04/19/24  0524 04/18/24  0437 04/17/24  0309 04/16/24  0055   PROTIME 15.1* 15.9* 18.7* 20.3* 17.0*   INR 1.42* 1.50* 1.79* 1.96* 1.62*             Lab 04/16/24  1159 04/16/24  0055   IRON  --  94   FERRITIN  --  1,801.00*   FOLATE 12.40  --    VITAMIN B 12 968*  --          Brief Urine Lab Results  (Last result in the past 365 days)        Color   Clarity   Blood   Leuk Est   Nitrite   Protein   CREAT   Urine HCG        04/16/24 1635 Orange  Comment: Any Substance that causes an abnormal urine color can alter the accuracy of the chemical reactions.   Clear   Trace   Trace   Positive   Negative                 Microbiology Results (last 10 days)       ** No results found for the last 240 hours. **            XR Chest PA & Lateral    Result Date: 4/16/2024  Impression: Impression: No acute cardiopulmonary findings. Electronically Signed: Barron Mcdaniel MD  4/16/2024 10:14 AM EDT  Workstation ID: [id number]    CT Abdomen Pelvis Without Contrast    Result Date: 4/16/2024  Impression: Impression: 1. Hepatosplenomegaly. 2. Mesenteric edema and small volume ascites likely related to portal hypertension. 3. Areas of colonic wall thickening and adjacent edema that might relate to portal hypertensive colopathy or possibly colitis. Correlate with symptoms. Electronically Signed: Almas Werner MD  4/16/2024 2:22 AM EDT  Workstation ID: [id number]                 Labs Pending at Discharge:      Procedures Performed           Consults:   Consults       Date and Time Order Name Status Description    4/21/2024  9:20 AM Inpatient Infectious Diseases Consult Completed     4/16/2024 12:50 AM Inpatient Gastroenterology Consult Completed           Attestation signed by Ronaldo Shen MD at 04/19/24 9581     I have reviewed this documentation and agree.  I have performed the physical and decision-making component of this encounter.  Patient with no nausea or  vomiting.  Tolerating diet.  Denies abdominal pain.  Physical exam  Scleral icterus is present diffuse jaundice  Abdomen is soft, nontender, nondistended  Labs-hemoglobin 10.4, white blood cell count 23.14, potassium 3.0, albumin 2.5, bilirubin 18.7, alkaline phosphatase 197, , ALT 59, INR 1.5  Assessment plan  Alcoholic hepatitis-improving.  Completed N-acetylcysteine drip.  Oral vitamin K daily  Highly encouraged to continue aggressive nutrition with 3 meals, 3 snacks, Ensure at bedtime.  Elevated ammonia-mental status improved on Xifaxan and lactulose.  Continue these  Alcohol abuse-multivitamin, folic acid, thiamine, zinc, monitor for withdrawal.  Patient will need to be discharged on a total of 28 days of prednisolone with a taper over the last 2 weeks.  He can be discharged in the morning if labs continue to improve and he shows no signs of withdrawal.  Little else we can help with from a GI standpoint.  He will need to follow-up in GI clinic for labs next week.  We will sign off but are available for questions if needed.        Assessment & Plan  Assessment     Leukocytosis-likely reactive to liver disease and steroid use.  Patient denies a history of leukocytosis.  Chest x-ray shows no signs of infiltrates and CT is consistent with the sequela of liver disease.  Urinalysis was unremarkable.  No noted hepatic encephalopathy to be concerned with aspiration pneumonia     Alcoholic liver disease due to EtOH abuse.  Patient is jaundiced with elevated liver transaminase and hyperbilirubinemia.  Hepatitis panel was negative.  GI following.       Tobacco abuse     Plan     Monitor off antimicrobial therapy  Continue supportive care  Alcohol and tobacco abuse cessation  Case discussed with RN  Not much more to add from infectious disease standpoint-we will sign off at this time-please call with any questions.     Ananya Nascimento, APRN  04/21/24  14:49 EDT    Discharge Details        Discharge Medications         New Medications        Instructions Start Date   bisacodyl 10 MG suppository  Commonly known as: DULCOLAX   10 mg, Rectal, Daily PRN      chlordiazePOXIDE 25 MG capsule  Commonly known as: LIBRIUM   25 mg, Oral, Every 8 Hours Scheduled      ClearLax 17 GM/SCOOP powder  Generic drug: polyethylene glycol   17 g, Oral, Daily PRN      folic acid 1 MG tablet  Commonly known as: FOLVITE   1 mg, Oral, Daily   Start Date: April 23, 2024     lactulose 10 GM/15ML solution  Commonly known as: CHRONULAC   10 g, Oral, Daily   Start Date: April 23, 2024     Nicotine Step 1 21 MG/24HR patch  Generic drug: nicotine   1 patch, Transdermal, Daily   Start Date: April 23, 2024     predniSONE 10 MG tablet  Commonly known as: DELTASONE   Take 3 tablets by mouth Daily for 7 days, THEN 2 tablets Daily for 7 days, THEN 1 tablet Daily for 7 days.   Start Date: April 23, 2024     Thiamine Mononitrate 100 MG tablet   100 mg, Oral, Daily   Start Date: April 23, 2024     Xifaxan 550 MG tablet  Generic drug: riFAXIMin   550 mg, Oral, Every 12 Hours Scheduled      zinc sulfate 220 (50 Zn) MG capsule  Commonly known as: ZINCATE   220 mg, Oral, Daily   Start Date: April 23, 2024              No Known Allergies      Discharge Disposition:   Home or Self Care    Diet:  Hospital:  Diet Order   Procedures    Diet: Regular/House; Fluid Consistency: Thin (IDDSI 0)         Discharge Activity:   Activity Instructions       Gradually Increase Activity Until at Pre-Hospitalization Level                CODE STATUS:  Code Status and Medical Interventions:   Ordered at: 04/16/24 0046     Code Status (Patient has no pulse and is not breathing):    CPR (Attempt to Resuscitate)     Medical Interventions (Patient has pulse or is breathing):    Full Support         No future appointments.    Additional Instructions for the Follow-ups that You Need to Schedule       Discharge Follow-up with PCP   As directed       Currently Documented PCP:    Provider, No Known     PCP Phone Number:    None     Follow Up Details: 1 week        Discharge Follow-up with Specified Provider: GI; 1 Week   As directed      To: GI   Follow Up: 1 Week                Time spent on Discharge including face to face service:  >30 minutes    Signature: Electronically signed by Vick Molina MD, 04/22/24, 11:12 EDT.  [facility] Hospitalist Team    Electronically signed by Vick Molina MD at 04/22/24 0739

## 2024-04-22 NOTE — CASE MANAGEMENT/SOCIAL WORK
Social Work Assessment  [facility]     Patient Name: Duglas Candelaria  MRN: [id number]  Today's Date: 4/22/2024    Admit Date: 4/16/2024     Discharge Plan       Row Name 04/22/24 0955       Plan    Plan Anticipate return home alone at d/c. [facility] ([facility]) 4/24 @ 11AM, added to AVS.    Plan Comments Noted discharge order, contacted [facility] for PCP appointment. Added to AVS for 4/24 @ 11AM. Requested nursing reprint d/t appointment being added.             TAMMY Aranda, LSW, [facility]    Phone: [phone]  Cell: [phone]  Fax: [phone]  [email or web address]

## 2024-04-22 NOTE — CASE MANAGEMENT/SOCIAL WORK
Case Management Discharge Note           Transportation Services  Private: Car    Final Discharge Disposition Code: 01 - home or self-care

## 2024-04-22 NOTE — DISCHARGE SUMMARY
[facility] Medicine Services  Discharge Summary    Date of Service: 24  Patient Name: Duglas Candelaria  : 1990  MRN: [id number]    Date of Admission: 2024  Discharge Diagnosis: Alcohol abuse with acute alcoholic hepatitis  Date of Discharge:  24  Primary Care Physician: Provider, No Known      Presenting Problem:   Jaundice [R17]    Active and Resolved Hospital Problems:  Active Hospital Problems    Diagnosis POA    **Jaundice [R17] Yes    ETOH abuse [F10.10] Unknown      Resolved Hospital Problems   No resolved problems to display.     Alcohol abuse with acute alcoholic hepatitis  -Patient has daily alcohol use for the last several years  -Presents with significant elevated bilirubin, slightly elevated AST and ALT consistent with alcoholic hepatitis  -Elevated INR consistent with coagulopathy related to acute liver dysfunction  -Maddrey discriminant function score of 57.8 suggests patient would benefit from steroid therapy; he has been initiated on oral methylprednisolone  -Check Lille score on day 7 to assess efficacy of steroid therapy, although patient will likely be discharged before that but he is clear improvement over the first few days of treatment with steroids  -Initiated on N-acetylcysteine protocol per GI  -CT of the abdomen/pelvis does not show any acute pathology, although there is evidence of hepatosplenomegaly with mesenteric edema and small ascites all consistent with liver dysfunction  -Bilirubin improving although INR remains elevated but improved after vitamin K administration  -Continue to encourage p.o. intake  -Continue on CIWA protocol for possible alcohol withdrawal with addition of multivitamin, folic acid, zinc, thiamine  -Initiate scheduled Librium to minimize need for as needed Ativan for alcohol withdrawal; patient has had significant improvement in his withdrawal symptoms with Librium  -Taper Librium over the next couple of days  -Continue  Xifaxan  -Continue lactulose to titrate for 2-3 BM per day  -Patient with leukocytosis although no significant finding consistent with infectious process, given that chest x-ray is normal and urinalysis is not really consistent with UTI; -  -off Rocephin  -Patient will need 4 weeks total of methylprednisolone on discharge, with plan to taper doses over the last 2 weeks     Acute hypophosphatemia  -Replete     Acute hypokalemia  -Replete    Hospital Course     HPI: Duglas Candelaria is a 34 y.o. male with no significant medial presented to the hospital as a transfer from [facility] and was admitted with a principal diagnosis of Jaundice.      Patient is Turkmen-speaking and history has been obtained through tele-   Patient reports he presented for treatment because skin and eyes were yellow in color.  Denies any chest pain, shortness of breath, edema, nausea or vomiting or change in bowel habits.  Possibly some slight diarrhea.  Patient reports he is positive for tobacco use and consumes approximately 11-12 beers a day that he is done over the past several years.  Over the past 5 to 6 weeks he had noticed a change in skin color and eye color.  Mild cough.  Patient reports that has had symptoms of withdrawal in the past     Patient arrived noted a disc was sent but no printed report of labs or imaging.  Was reported per charge nurse that facility had reported was attempting to get records from another outside facility where patient had labs drawn.  Get CT of abdomen pelvis and repeat labs.  Consult GI in place on CIWA protocol      Interval History:   4/19/24-Patient continues to feel well, denies any withdrawal symptoms.  He denies any further diarrhea.  He has been tolerating p.o. intake very well.  4/20/24 seen in bed no new complaints, VSS. No withdrawal sx, WBC higher 27, afebrile  4/21/24 and examined in bed no acute distress, blood pressure stable, heart rate 106, temperature 97.3.  4/22/2024  patient seen and examined doing better today, she to go home.  Vital signs stable, will DC patient home.  Condition at discharge stable.  Discussed with RN.  DISCHARGE Follow Up Recommendations for labs and diagnostics: Follow-up with PCP in a week.      Reasons For Change In Medications and Indications for New Medications:   NORMA pizarro:  bisacodyl (DULCOLAX)   chlordiazePOXIDE (LIBRIUM)   ClearLax (polyethylene glycol)   folic acid (FOLVITE)   Nicotine Step 1 (nicotine)   predniSONE (DELTASONE)   Thiamine Mononitrate   Xifaxan (riFAXIMin)   zinc sulfate (ZINCATE)         Lactulose  Day of Discharge     Vital Signs:  Temp:  [97.5 °F (36.4 °C)-98.1 °F (36.7 °C)] 98.1 °F (36.7 °C)  Heart Rate:  [] 104  Resp:  [15-20] 20  BP: (106-125)/(71-79) 115/79      Physical Exam   General Appearance:  Alert, cooperative, no distress, appears stated age  Head:   Normocephalic, without obvious abnormality, atraumatic  Eyes:   PERRL, conjunctiva/corneas clear, EOM's intact, fundi benign, bilateral icteric sclera  Ears:    Normal TM's and external ear canals, both ears  Nose:Nares normal, septum midline, mucosa normal, no drainage or sinus tenderness  Throat:Lips, mucosa, and tongue normal; teeth and gums normal  Neck:Supple, symmetrical, trachea midline, no adenopathy, thyroid: not enlarged, symmetric, no tenderness/mass/nodules, no carotid bruit or JVD  Lungs:             Clear to auscultation bilaterally, respirations unlabored  Heart:  Regular rate and rhythm, S1, S2 normal, no murmur, rub or gallop  Abdomen:  Soft, non-tender, bowel sounds active all four quadrants,  no masses, no organomegaly  Extremities:Extremities normal, atraumatic, no cyanosis or edema  Pulses:2+ and symmetric  Skin:Skin color, texture, turgor normal, no rashes or lesions, jaundiced      Pertinent  and/or Most Recent Results     LAB RESULTS:      Lab 04/22/24  0313 04/21/24  0140 04/20/24  0303 04/19/24  0524 04/18/24  0437 04/17/24  0309  04/16/24  1159 04/16/24  0055   WBC 25.83* 27.05* 27.73* 23.14* 24.68* 18.85*   < > 22.66*   HEMOGLOBIN 10.9* 10.9* 10.8* 10.4* 11.0* 10.3*   < > 11.6*   HEMATOCRIT 30.8* 31.1* 30.1* 29.3* 31.0* 28.5*   < > 32.3*   PLATELETS 228 244 259 262 223 179   < > 225   NEUTROS ABS 21.84* 22.26* 21.37* 19.29* 21.73* 17.10*   < > 18.59*   IMMATURE GRANS (ABS) 1.26* 1.67* 1.80* 1.01* 0.76* 0.25*   < > 0.29*   LYMPHS ABS 1.23 1.41 2.23 1.33 1.20 0.77   < > 1.80   MONOS ABS 1.37* 1.57* 2.15* 1.42* 0.95* 0.70   < > 1.61*   EOS ABS 0.04 0.02 0.03 0.00 0.00 0.00   < > 0.29   .3* 102.6* 100.3* 100.0* 100.6* 98.6*   < > 98.2*   PROCALCITONIN  --   --  0.17  --   --   --   --   --    PROTIME  --   --  15.1* 15.9* 18.7* 20.3*  --  17.0*    < > = values in this interval not displayed.         Lab 04/22/24  0313 04/21/24  1555 04/21/24  0140 04/20/24  1501 04/20/24  0303 04/19/24  2031 04/19/24  0625 04/19/24  0524 04/18/24  2142 04/18/24  1221 04/18/24  0437 04/17/24  2127   SODIUM 134*  --  134*  --  135*  --  136  --   --   --  135*  --    POTASSIUM 3.5 4.1 3.6 4.1 3.5   < > 3.0*  --   --   --  4.0 4.5   CHLORIDE 103  --  103  --  104  --  104  --   --   --  104  --    CO2 20.0*  --  19.0*  --  21.0*  --  21.0*  --   --   --  19.0*  --    ANION GAP 11.0  --  12.0  --  10.0  --  11.0  --   --   --  12.0  --    BUN 12  --  11  --  10  --  7  --   --   --  7  --    CREATININE 0.44*  --  0.39*  --  0.42*  --  0.36*  --   --   --  0.41*  --    EGFR 144.4  --  149.8  --  146.5  --  153.5  --   --   --  147.6  --    GLUCOSE 110*  --  120*  --  92  --  124*  --   --   --  123*  --    CALCIUM 8.2*  --  8.1*  --  8.1*  --  8.2*  --   --   --  8.6  --    MAGNESIUM 2.1  --  2.2  --  2.2  --  2.2  --   --   --  2.3  --    PHOSPHORUS  --   --   --   --   --   --   --  2.7 2.1* 2.2* 2.1* 1.8*    < > = values in this interval not displayed.         Lab 04/22/24  0313 04/21/24  0140 04/20/24  0303 04/19/24  0625 04/18/24  0437   TOTAL PROTEIN  6.3 6.5 6.3 6.6 7.0   ALBUMIN 2.6* 2.7* 2.5* 2.5* 2.7*   GLOBULIN 3.7 3.8 3.8 4.1 4.3   ALT (SGPT) 86* 86* 73* 59* 54*   AST (SGOT) 138* 168* 144* 106* 95*   BILIRUBIN 15.7* 17.3* 17.5* 18.7* 23.2*   ALK PHOS 228* 223* 210* 197* 225*         Lab 04/20/24  0303 04/19/24  0524 04/18/24  0437 04/17/24  0309 04/16/24  0055   PROTIME 15.1* 15.9* 18.7* 20.3* 17.0*   INR 1.42* 1.50* 1.79* 1.96* 1.62*             Lab 04/16/24  1159 04/16/24  0055   IRON  --  94   FERRITIN  --  1,801.00*   FOLATE 12.40  --    VITAMIN B 12 968*  --          Brief Urine Lab Results  (Last result in the past 365 days)        Color   Clarity   Blood   Leuk Est   Nitrite   Protein   CREAT   Urine HCG        04/16/24 1635 Orange  Comment: Any Substance that causes an abnormal urine color can alter the accuracy of the chemical reactions.   Clear   Trace   Trace   Positive   Negative                 Microbiology Results (last 10 days)       ** No results found for the last 240 hours. **            XR Chest PA & Lateral    Result Date: 4/16/2024  Impression: Impression: No acute cardiopulmonary findings. Electronically Signed: Barron Mcdaniel MD  4/16/2024 10:14 AM EDT  Workstation ID: [id number]    CT Abdomen Pelvis Without Contrast    Result Date: 4/16/2024  Impression: Impression: 1. Hepatosplenomegaly. 2. Mesenteric edema and small volume ascites likely related to portal hypertension. 3. Areas of colonic wall thickening and adjacent edema that might relate to portal hypertensive colopathy or possibly colitis. Correlate with symptoms. Electronically Signed: Almas Werner MD  4/16/2024 2:22 AM EDT  Workstation ID: [id number]                 Labs Pending at Discharge:      Procedures Performed           Consults:   Consults       Date and Time Order Name Status Description    4/21/2024  9:20 AM Inpatient Infectious Diseases Consult Completed     4/16/2024 12:50 AM Inpatient Gastroenterology Consult Completed           Attestation signed by Ac,  MD Ronaldo at 04/19/24 1956     I have reviewed this documentation and agree.  I have performed the physical and decision-making component of this encounter.  Patient with no nausea or vomiting.  Tolerating diet.  Denies abdominal pain.  Physical exam  Scleral icterus is present diffuse jaundice  Abdomen is soft, nontender, nondistended  Labs-hemoglobin 10.4, white blood cell count 23.14, potassium 3.0, albumin 2.5, bilirubin 18.7, alkaline phosphatase 197, , ALT 59, INR 1.5  Assessment plan  Alcoholic hepatitis-improving.  Completed N-acetylcysteine drip.  Oral vitamin K daily  Highly encouraged to continue aggressive nutrition with 3 meals, 3 snacks, Ensure at bedtime.  Elevated ammonia-mental status improved on Xifaxan and lactulose.  Continue these  Alcohol abuse-multivitamin, folic acid, thiamine, zinc, monitor for withdrawal.  Patient will need to be discharged on a total of 28 days of prednisolone with a taper over the last 2 weeks.  He can be discharged in the morning if labs continue to improve and he shows no signs of withdrawal.  Little else we can help with from a GI standpoint.  He will need to follow-up in GI clinic for labs next week.  We will sign off but are available for questions if needed.        Assessment & Plan  Assessment     Leukocytosis-likely reactive to liver disease and steroid use.  Patient denies a history of leukocytosis.  Chest x-ray shows no signs of infiltrates and CT is consistent with the sequela of liver disease.  Urinalysis was unremarkable.  No noted hepatic encephalopathy to be concerned with aspiration pneumonia     Alcoholic liver disease due to EtOH abuse.  Patient is jaundiced with elevated liver transaminase and hyperbilirubinemia.  Hepatitis panel was negative.  GI following.       Tobacco abuse     Plan     Monitor off antimicrobial therapy  Continue supportive care  Alcohol and tobacco abuse cessation  Case discussed with RN  Not much more to add from  infectious disease standpoint-we will sign off at this time-please call with any questions.     Ananya BERNAL Azam, APRN  04/21/24  14:49 EDT    Discharge Details        Discharge Medications        New Medications        Instructions Start Date   bisacodyl 10 MG suppository  Commonly known as: DULCOLAX   10 mg, Rectal, Daily PRN      chlordiazePOXIDE 25 MG capsule  Commonly known as: LIBRIUM   25 mg, Oral, Every 8 Hours Scheduled      ClearLax 17 GM/SCOOP powder  Generic drug: polyethylene glycol   17 g, Oral, Daily PRN      folic acid 1 MG tablet  Commonly known as: FOLVITE   1 mg, Oral, Daily   Start Date: April 23, 2024     lactulose 10 GM/15ML solution  Commonly known as: CHRONULAC   10 g, Oral, Daily   Start Date: April 23, 2024     Nicotine Step 1 21 MG/24HR patch  Generic drug: nicotine   1 patch, Transdermal, Daily   Start Date: April 23, 2024     predniSONE 10 MG tablet  Commonly known as: DELTASONE   Take 3 tablets by mouth Daily for 7 days, THEN 2 tablets Daily for 7 days, THEN 1 tablet Daily for 7 days.   Start Date: April 23, 2024     Thiamine Mononitrate 100 MG tablet   100 mg, Oral, Daily   Start Date: April 23, 2024     Xifaxan 550 MG tablet  Generic drug: riFAXIMin   550 mg, Oral, Every 12 Hours Scheduled      zinc sulfate 220 (50 Zn) MG capsule  Commonly known as: ZINCATE   220 mg, Oral, Daily   Start Date: April 23, 2024              No Known Allergies      Discharge Disposition:   Home or Self Care    Diet:  Hospital:  Diet Order   Procedures    Diet: Regular/House; Fluid Consistency: Thin (IDDSI 0)         Discharge Activity:   Activity Instructions       Gradually Increase Activity Until at Pre-Hospitalization Level                CODE STATUS:  Code Status and Medical Interventions:   Ordered at: 04/16/24 0046     Code Status (Patient has no pulse and is not breathing):    CPR (Attempt to Resuscitate)     Medical Interventions (Patient has pulse or is breathing):    Full Support         No  future appointments.    Additional Instructions for the Follow-ups that You Need to Schedule       Discharge Follow-up with PCP   As directed       Currently Documented PCP:    Provider, No Known    PCP Phone Number:    None     Follow Up Details: 1 week        Discharge Follow-up with Specified Provider: GI; 1 Week   As directed      To: GI   Follow Up: 1 Week                Time spent on Discharge including face to face service:  >30 minutes    Signature: Electronically signed by Vick Molina MD, 04/22/24, 11:12 EDT.  [facility] Hospitalist Team

## 2024-04-22 NOTE — THERAPY TREATMENT NOTE
Subjective: Pt agreeable to therapeutic plan of care. Pt w/ signed d/c orders, RN reports pt is d/c'ing home and pt reports he is returning to work tomorrow as a .    Objective:     Bed mobility - Independent  Transfers - Independent  Ambulation - 200 ft x 2 CGA (mild ataxia noted)    Vitals: WNL (on RA)    Pain: 0 VAS       Education: Provided education on the importance of mobility in the acute care setting    Assessment: Duglas Candelaria presents with functional mobility impairments which indicate the need for skilled intervention. Tolerating session today without incident. Pt demonstrated significant improvement with mobility this date, demonstrated independence with bed mobility/transfers and required CGA for gait training 200 ft x 2 with no AD, still some mild ataxia noted but with no LOB. Anticipate pt will be safe to d/c home with assist from family/friends as needed.     Plan/Recommendations:   If medically appropriate, No ongoing therapy recommended post-acute care. No therapy needs. Pt requires no DME at discharge.     Pt desires Home at discharge. Pt cooperative; agreeable to therapeutic recommendations and plan of care.         Basic Mobility 6-click:  Rollin = Total, A lot = 2, A little = 3; 4 = None  Supine>Sit:   1 = Total, A lot = 2, A little = 3; 4 = None   Sit>Stand with arms:  1 = Total, A lot = 2, A little = 3; 4 = None  Bed>Chair:   1 = Total, A lot = 2, A little = 3; 4 = None  Ambulate in room:  1 = Total, A lot = 2, A little = 3; 4 = None  3-5 Steps with railin = Total, A lot = 2, A little = 3; 4 = None  Score: 22    Post-Tx Position: Supine with HOB Elevated  PPE: gloves

## 2024-04-23 ENCOUNTER — READMISSION MANAGEMENT (OUTPATIENT)
Dept: CALL CENTER | Facility: HOSPITAL | Age: 34
End: 2024-04-23
Payer: MEDICAID

## 2024-04-23 NOTE — OUTREACH NOTE
Prep Survey      Flowsheet Row Responses   [facility] facility patient discharged from? [facility]   Is LACE score < 7 ? No   Eligibility Readm Mgmt   Discharge diagnosis Jaundice, ETOH abuse   Does the patient have one of the following disease processes/diagnoses(primary or secondary)? Other   Does the patient have Home health ordered? No   Is there a DME ordered? No   Comments regarding appointments Schedule an appointment with [facility] [place] (Primary Care) on 4/24/2024,  Appointment 4/24 @ 11AM. *Please arrive 15 minutes early.   Medication alerts for this patient see avs   General alerts for this patient Syriac speaking   Prep survey completed? Yes            Mar RODRIGUEZ - Registered Nurse

## 2024-04-29 ENCOUNTER — READMISSION MANAGEMENT (OUTPATIENT)
Dept: CALL CENTER | Facility: HOSPITAL | Age: 34
End: 2024-04-29
Payer: MEDICAID

## 2024-04-29 NOTE — OUTREACH NOTE
Medical Week 1 Survey      Flowsheet Row Responses   [facility] patient discharged from? [facility]   Does the patient have one of the following disease processes/diagnoses(primary or secondary)? Other   Week 1 attempt successful? Yes   Call start time 1324   Call end time 1326   Discharge diagnosis Jaundice, ETOH abuse   Person spoke with today (if not patient) and relationship Patient   Is the patient taking all medications as directed (includes completed medication regime)? Yes   Does the patient have a primary care provider?  Yes   Does the patient have an appointment with their PCP within 7 days of discharge? Yes   Comments regarding PCP [facility]. Patient has had f/u since discharge.   Has the patient kept scheduled appointments due by today? Yes   Comments Patient reports has not yet had f/u with GI.   Has home health visited the patient within 72 hours of discharge? N/A   Psychosocial issues? No   Did the patient receive a copy of their discharge instructions? Yes   Nursing interventions Reviewed instructions with patient   What is the patient's perception of their health status since discharge? Improving   Week 1 call completed? Yes   Would this patient benefit from a Referral to Amb Social Work? No   Is the patient interested in additional calls from an ambulatory ? No   Wrap up additional comments Brief call with patient. He reports feeling better. Denies any needs.   Call end time 1326            DAYO AL - Registered Nurse

## 2024-05-08 ENCOUNTER — READMISSION MANAGEMENT (OUTPATIENT)
Dept: CALL CENTER | Facility: HOSPITAL | Age: 34
End: 2024-05-08
Payer: MEDICAID

## 2025-07-06 ENCOUNTER — APPOINTMENT (OUTPATIENT)
Dept: GENERAL RADIOLOGY | Facility: HOSPITAL | Age: 35
End: 2025-07-06
Payer: MEDICAID

## 2025-07-06 ENCOUNTER — APPOINTMENT (OUTPATIENT)
Dept: ULTRASOUND IMAGING | Facility: HOSPITAL | Age: 35
End: 2025-07-06
Payer: MEDICAID

## 2025-07-06 ENCOUNTER — HOSPITAL ENCOUNTER (INPATIENT)
Facility: HOSPITAL | Age: 35
LOS: 4 days | Discharge: HOME | End: 2025-07-10
Attending: HOSPITALIST | Admitting: INTERNAL MEDICINE
Payer: MEDICAID

## 2025-07-06 ENCOUNTER — APPOINTMENT (OUTPATIENT)
Dept: OTHER | Facility: HOSPITAL | Age: 35
End: 2025-07-06
Payer: MEDICAID

## 2025-07-06 ENCOUNTER — APPOINTMENT (OUTPATIENT)
Dept: CT IMAGING | Facility: HOSPITAL | Age: 35
End: 2025-07-06
Payer: MEDICAID

## 2025-07-06 PROBLEM — F10.931 ALCOHOL WITHDRAWAL DELIRIUM: Status: ACTIVE | Noted: 2025-07-06

## 2025-07-06 LAB
ALBUMIN SERPL-MCNC: 3.4 G/DL (ref 3.5–5.2)
ALBUMIN/GLOB SERPL: 0.9 G/DL
ALP SERPL-CCNC: 213 U/L (ref 39–117)
ALT SERPL W P-5'-P-CCNC: 47 U/L (ref 1–41)
AMMONIA BLD-SCNC: 37 UMOL/L (ref 16–60)
AMPHET+METHAMPHET UR QL: NEGATIVE
AMPHETAMINES UR QL: NEGATIVE
ANION GAP SERPL CALCULATED.3IONS-SCNC: 14.3 MMOL/L (ref 5–15)
ANION GAP SERPL CALCULATED.3IONS-SCNC: 18.5 MMOL/L (ref 5–15)
AST SERPL-CCNC: 121 U/L (ref 1–40)
BACTERIA UR QL AUTO: ABNORMAL /HPF
BARBITURATES UR QL SCN: POSITIVE
BASOPHILS # BLD AUTO: 0.03 10*3/MM3 (ref 0–0.2)
BASOPHILS NFR BLD AUTO: 0.2 % (ref 0–1.5)
BENZODIAZ UR QL SCN: POSITIVE
BILIRUB SERPL-MCNC: 14.5 MG/DL (ref 0–1.2)
BILIRUB UR QL STRIP: ABNORMAL
BUN SERPL-MCNC: 23 MG/DL (ref 6–20)
BUN SERPL-MCNC: 23.6 MG/DL (ref 6–20)
BUN/CREAT SERPL: 14.2 (ref 7–25)
BUN/CREAT SERPL: 20.9 (ref 7–25)
BUPRENORPHINE SERPL-MCNC: NEGATIVE NG/ML
CALCIUM SPEC-SCNC: 8.2 MG/DL (ref 8.6–10.5)
CALCIUM SPEC-SCNC: 8.6 MG/DL (ref 8.6–10.5)
CANNABINOIDS SERPL QL: NEGATIVE
CHLORIDE SERPL-SCNC: 97 MMOL/L (ref 98–107)
CHLORIDE SERPL-SCNC: 99 MMOL/L (ref 98–107)
CLARITY UR: ABNORMAL
CO2 SERPL-SCNC: 18.5 MMOL/L (ref 22–29)
CO2 SERPL-SCNC: 19.7 MMOL/L (ref 22–29)
COCAINE UR QL: NEGATIVE
COLOR UR: ABNORMAL
CREAT SERPL-MCNC: 1.1 MG/DL (ref 0.76–1.27)
CREAT SERPL-MCNC: 1.66 MG/DL (ref 0.76–1.27)
D-LACTATE SERPL-SCNC: 0.9 MMOL/L (ref 0.5–2)
DEPRECATED RDW RBC AUTO: 50.1 FL (ref 37–54)
EGFRCR SERPLBLD CKD-EPI 2021: 55.5 ML/MIN/1.73
EGFRCR SERPLBLD CKD-EPI 2021: 90.9 ML/MIN/1.73
EOSINOPHIL # BLD AUTO: 0.06 10*3/MM3 (ref 0–0.4)
EOSINOPHIL NFR BLD AUTO: 0.5 % (ref 0.3–6.2)
ERYTHROCYTE [DISTWIDTH] IN BLOOD BY AUTOMATED COUNT: 14.6 % (ref 12.3–15.4)
GLOBULIN UR ELPH-MCNC: 3.8 GM/DL
GLUCOSE BLDC GLUCOMTR-MCNC: 84 MG/DL (ref 70–105)
GLUCOSE SERPL-MCNC: 85 MG/DL (ref 65–99)
GLUCOSE SERPL-MCNC: 90 MG/DL (ref 65–99)
GLUCOSE UR STRIP-MCNC: NEGATIVE MG/DL
HAV IGM SERPL QL IA: NORMAL
HBV CORE IGM SERPL QL IA: NORMAL
HBV SURFACE AG SERPL QL IA: NORMAL
HCT VFR BLD AUTO: 38.2 % (ref 37.5–51)
HCV AB SER QL: NORMAL
HGB BLD-MCNC: 13.2 G/DL (ref 13–17.7)
HGB UR QL STRIP.AUTO: ABNORMAL
HYALINE CASTS UR QL AUTO: ABNORMAL /LPF
IMM GRANULOCYTES # BLD AUTO: 0.09 10*3/MM3 (ref 0–0.05)
IMM GRANULOCYTES NFR BLD AUTO: 0.7 % (ref 0–0.5)
INR PPP: 1.36 (ref 0.9–1.1)
KETONES UR QL STRIP: ABNORMAL
L PNEUMO1 AG UR QL IA: NEGATIVE
LEUKOCYTE ESTERASE UR QL STRIP.AUTO: ABNORMAL
LIPASE SERPL-CCNC: 39 U/L (ref 13–60)
LYMPHOCYTES # BLD AUTO: 1.07 10*3/MM3 (ref 0.7–3.1)
LYMPHOCYTES NFR BLD AUTO: 8.5 % (ref 19.6–45.3)
MAGNESIUM SERPL-MCNC: 2 MG/DL (ref 1.6–2.6)
MAGNESIUM SERPL-MCNC: 2.3 MG/DL (ref 1.6–2.6)
MCH RBC QN AUTO: 32.3 PG (ref 26.6–33)
MCHC RBC AUTO-ENTMCNC: 34.6 G/DL (ref 31.5–35.7)
MCV RBC AUTO: 93.4 FL (ref 79–97)
METHADONE UR QL SCN: NEGATIVE
MONOCYTES # BLD AUTO: 1.28 10*3/MM3 (ref 0.1–0.9)
MONOCYTES NFR BLD AUTO: 10.2 % (ref 5–12)
MRSA DNA SPEC QL NAA+PROBE: NORMAL
NEUTROPHILS NFR BLD AUTO: 10.01 10*3/MM3 (ref 1.7–7)
NEUTROPHILS NFR BLD AUTO: 79.9 % (ref 42.7–76)
NITRITE UR QL STRIP: POSITIVE
NRBC BLD AUTO-RTO: 0 /100 WBC (ref 0–0.2)
OPIATES UR QL: NEGATIVE
OXYCODONE UR QL SCN: NEGATIVE
PCP UR QL SCN: NEGATIVE
PH UR STRIP.AUTO: 7 [PH] (ref 5–8)
PHOSPHATE SERPL-MCNC: 2.5 MG/DL (ref 2.5–4.5)
PLATELET # BLD AUTO: 66 10*3/MM3 (ref 140–450)
PMV BLD AUTO: 11.8 FL (ref 6–12)
POTASSIUM SERPL-SCNC: 3 MMOL/L (ref 3.5–5.2)
POTASSIUM SERPL-SCNC: 3 MMOL/L (ref 3.5–5.2)
POTASSIUM SERPL-SCNC: 3.3 MMOL/L (ref 3.5–5.2)
PROCALCITONIN SERPL-MCNC: 0.95 NG/ML (ref 0–0.25)
PROT SERPL-MCNC: 7.2 G/DL (ref 6–8.5)
PROT UR QL STRIP: ABNORMAL
PROTHROMBIN TIME: 16.8 SECONDS (ref 11.7–14.2)
RBC # BLD AUTO: 4.09 10*6/MM3 (ref 4.14–5.8)
RBC # UR STRIP: ABNORMAL /HPF
REF LAB TEST METHOD: ABNORMAL
S PNEUM AG SPEC QL LA: NEGATIVE
SODIUM SERPL-SCNC: 133 MMOL/L (ref 136–145)
SODIUM SERPL-SCNC: 134 MMOL/L (ref 136–145)
SP GR UR STRIP: 1.04 (ref 1–1.03)
SQUAMOUS #/AREA URNS HPF: ABNORMAL /HPF
TRICYCLICS UR QL SCN: NEGATIVE
UROBILINOGEN UR QL STRIP: ABNORMAL
WBC # UR STRIP: ABNORMAL /HPF
WBC NRBC COR # BLD AUTO: 12.54 10*3/MM3 (ref 3.4–10.8)

## 2025-07-06 PROCEDURE — 63710000001 PREDNISOLONE PER 5 MG: Performed by: NURSE PRACTITIONER

## 2025-07-06 PROCEDURE — 25010000002 THIAMINE PER 100 MG: Performed by: NURSE PRACTITIONER

## 2025-07-06 PROCEDURE — 80053 COMPREHEN METABOLIC PANEL: CPT | Performed by: NURSE PRACTITIONER

## 2025-07-06 PROCEDURE — 85025 COMPLETE CBC W/AUTO DIFF WBC: CPT | Performed by: NURSE PRACTITIONER

## 2025-07-06 PROCEDURE — 71045 X-RAY EXAM CHEST 1 VIEW: CPT

## 2025-07-06 PROCEDURE — 82140 ASSAY OF AMMONIA: CPT | Performed by: NURSE PRACTITIONER

## 2025-07-06 PROCEDURE — 93005 ELECTROCARDIOGRAM TRACING: CPT | Performed by: NURSE PRACTITIONER

## 2025-07-06 PROCEDURE — 83690 ASSAY OF LIPASE: CPT | Performed by: NURSE PRACTITIONER

## 2025-07-06 PROCEDURE — 76705 ECHO EXAM OF ABDOMEN: CPT

## 2025-07-06 PROCEDURE — 25010000002 PHENOBARBITAL PER 120 MG: Performed by: NURSE PRACTITIONER

## 2025-07-06 PROCEDURE — 83735 ASSAY OF MAGNESIUM: CPT | Performed by: INTERNAL MEDICINE

## 2025-07-06 PROCEDURE — 83605 ASSAY OF LACTIC ACID: CPT | Performed by: NURSE PRACTITIONER

## 2025-07-06 PROCEDURE — 84132 ASSAY OF SERUM POTASSIUM: CPT | Performed by: INTERNAL MEDICINE

## 2025-07-06 PROCEDURE — 70450 CT HEAD/BRAIN W/O DYE: CPT

## 2025-07-06 PROCEDURE — 84100 ASSAY OF PHOSPHORUS: CPT | Performed by: INTERNAL MEDICINE

## 2025-07-06 PROCEDURE — 85610 PROTHROMBIN TIME: CPT | Performed by: NURSE PRACTITIONER

## 2025-07-06 PROCEDURE — 81001 URINALYSIS AUTO W/SCOPE: CPT | Performed by: NURSE PRACTITIONER

## 2025-07-06 PROCEDURE — 84145 PROCALCITONIN (PCT): CPT | Performed by: NURSE PRACTITIONER

## 2025-07-06 PROCEDURE — 80074 ACUTE HEPATITIS PANEL: CPT | Performed by: NURSE PRACTITIONER

## 2025-07-06 PROCEDURE — 87040 BLOOD CULTURE FOR BACTERIA: CPT | Performed by: NURSE PRACTITIONER

## 2025-07-06 PROCEDURE — 25010000002 DIAZEPAM PER 5 MG: Performed by: NURSE PRACTITIONER

## 2025-07-06 PROCEDURE — 80306 DRUG TEST PRSMV INSTRMNT: CPT | Performed by: NURSE PRACTITIONER

## 2025-07-06 PROCEDURE — 93010 ELECTROCARDIOGRAM REPORT: CPT | Performed by: INTERNAL MEDICINE

## 2025-07-06 PROCEDURE — 87086 URINE CULTURE/COLONY COUNT: CPT | Performed by: NURSE PRACTITIONER

## 2025-07-06 PROCEDURE — 25010000002 POTASSIUM CHLORIDE 10 MEQ/100ML SOLUTION: Performed by: INTERNAL MEDICINE

## 2025-07-06 PROCEDURE — 82948 REAGENT STRIP/BLOOD GLUCOSE: CPT

## 2025-07-06 PROCEDURE — 87449 NOS EACH ORGANISM AG IA: CPT | Performed by: NURSE PRACTITIONER

## 2025-07-06 PROCEDURE — 87641 MR-STAPH DNA AMP PROBE: CPT | Performed by: NURSE PRACTITIONER

## 2025-07-06 PROCEDURE — 25010000002 CEFTRIAXONE PER 250 MG: Performed by: NURSE PRACTITIONER

## 2025-07-06 PROCEDURE — 25010000002 FOLIC ACID 5 MG/ML SOLUTION 10 ML VIAL: Performed by: NURSE PRACTITIONER

## 2025-07-06 PROCEDURE — 87899 AGENT NOS ASSAY W/OPTIC: CPT | Performed by: NURSE PRACTITIONER

## 2025-07-06 PROCEDURE — 25010000002 MIDAZOLAM PER 1 MG: Performed by: NURSE PRACTITIONER

## 2025-07-06 PROCEDURE — 83735 ASSAY OF MAGNESIUM: CPT | Performed by: NURSE PRACTITIONER

## 2025-07-06 RX ORDER — THIAMINE HYDROCHLORIDE 100 MG/ML
200 INJECTION, SOLUTION INTRAMUSCULAR; INTRAVENOUS EVERY 8 HOURS SCHEDULED
Status: DISCONTINUED | OUTPATIENT
Start: 2025-07-06 | End: 2025-07-06

## 2025-07-06 RX ORDER — PHENOBARBITAL 32.4 MG/1
32.4 TABLET ORAL ONCE
Status: COMPLETED | OUTPATIENT
Start: 2025-07-08 | End: 2025-07-08

## 2025-07-06 RX ORDER — LORAZEPAM 1 MG/1
1 TABLET ORAL
Status: DISCONTINUED | OUTPATIENT
Start: 2025-07-06 | End: 2025-07-07 | Stop reason: SDUPTHER

## 2025-07-06 RX ORDER — DIAZEPAM 10 MG/2ML
5 INJECTION, SOLUTION INTRAMUSCULAR; INTRAVENOUS EVERY 4 HOURS PRN
Status: DISCONTINUED | OUTPATIENT
Start: 2025-07-06 | End: 2025-07-06

## 2025-07-06 RX ORDER — DIAZEPAM 5 MG/1
10 TABLET ORAL
Status: DISCONTINUED | OUTPATIENT
Start: 2025-07-06 | End: 2025-07-06

## 2025-07-06 RX ORDER — PREDNISOLONE SODIUM PHOSPHATE 15 MG/5ML
40 SOLUTION ORAL DAILY
Status: DISCONTINUED | OUTPATIENT
Start: 2025-07-06 | End: 2025-07-10 | Stop reason: HOSPADM

## 2025-07-06 RX ORDER — THIAMINE HYDROCHLORIDE 100 MG/ML
200 INJECTION, SOLUTION INTRAMUSCULAR; INTRAVENOUS EVERY 8 HOURS SCHEDULED
Status: DISCONTINUED | OUTPATIENT
Start: 2025-07-09 | End: 2025-07-10 | Stop reason: HOSPADM

## 2025-07-06 RX ORDER — MIDAZOLAM HYDROCHLORIDE 1 MG/ML
4 INJECTION, SOLUTION INTRAMUSCULAR; INTRAVENOUS
Status: DISCONTINUED | OUTPATIENT
Start: 2025-07-06 | End: 2025-07-07 | Stop reason: SDUPTHER

## 2025-07-06 RX ORDER — DIAZEPAM 10 MG/2ML
10 INJECTION, SOLUTION INTRAMUSCULAR; INTRAVENOUS
Status: DISCONTINUED | OUTPATIENT
Start: 2025-07-06 | End: 2025-07-06

## 2025-07-06 RX ORDER — PANTOPRAZOLE SODIUM 40 MG/10ML
40 INJECTION, POWDER, LYOPHILIZED, FOR SOLUTION INTRAVENOUS
Status: DISCONTINUED | OUTPATIENT
Start: 2025-07-07 | End: 2025-07-07

## 2025-07-06 RX ORDER — POLYETHYLENE GLYCOL 3350 17 G/17G
17 POWDER, FOR SOLUTION ORAL DAILY PRN
Status: DISCONTINUED | OUTPATIENT
Start: 2025-07-06 | End: 2025-07-10 | Stop reason: HOSPADM

## 2025-07-06 RX ORDER — SODIUM CHLORIDE 0.9 % (FLUSH) 0.9 %
10 SYRINGE (ML) INJECTION AS NEEDED
Status: DISCONTINUED | OUTPATIENT
Start: 2025-07-06 | End: 2025-07-10 | Stop reason: HOSPADM

## 2025-07-06 RX ORDER — PHENOBARBITAL 32.4 MG/1
32.4 TABLET ORAL ONCE
Status: DISCONTINUED | OUTPATIENT
Start: 2025-07-08 | End: 2025-07-10 | Stop reason: HOSPADM

## 2025-07-06 RX ORDER — SODIUM CHLORIDE 0.9 % (FLUSH) 0.9 %
10 SYRINGE (ML) INJECTION EVERY 12 HOURS SCHEDULED
Status: DISCONTINUED | OUTPATIENT
Start: 2025-07-06 | End: 2025-07-10 | Stop reason: HOSPADM

## 2025-07-06 RX ORDER — SODIUM CHLORIDE 9 MG/ML
40 INJECTION, SOLUTION INTRAVENOUS AS NEEDED
Status: DISCONTINUED | OUTPATIENT
Start: 2025-07-06 | End: 2025-07-10 | Stop reason: HOSPADM

## 2025-07-06 RX ORDER — PHENOBARBITAL SODIUM 65 MG/ML
65 INJECTION, SOLUTION INTRAMUSCULAR; INTRAVENOUS ONCE
Status: COMPLETED | OUTPATIENT
Start: 2025-07-07 | End: 2025-07-07

## 2025-07-06 RX ORDER — FOLIC ACID 1 MG/1
1 TABLET ORAL DAILY
Status: DISCONTINUED | OUTPATIENT
Start: 2025-07-06 | End: 2025-07-06

## 2025-07-06 RX ORDER — ZINC SULFATE 50(220)MG
220 CAPSULE ORAL DAILY
Status: DISCONTINUED | OUTPATIENT
Start: 2025-07-06 | End: 2025-07-10 | Stop reason: HOSPADM

## 2025-07-06 RX ORDER — BISACODYL 10 MG
10 SUPPOSITORY, RECTAL RECTAL DAILY PRN
Status: DISCONTINUED | OUTPATIENT
Start: 2025-07-06 | End: 2025-07-10 | Stop reason: HOSPADM

## 2025-07-06 RX ORDER — BISACODYL 5 MG/1
5 TABLET, DELAYED RELEASE ORAL DAILY PRN
Status: DISCONTINUED | OUTPATIENT
Start: 2025-07-06 | End: 2025-07-10 | Stop reason: HOSPADM

## 2025-07-06 RX ORDER — LACTULOSE 10 G/15ML
30 SOLUTION ORAL 3 TIMES DAILY
Status: DISCONTINUED | OUTPATIENT
Start: 2025-07-06 | End: 2025-07-10 | Stop reason: HOSPADM

## 2025-07-06 RX ORDER — MIDAZOLAM HYDROCHLORIDE 1 MG/ML
2 INJECTION, SOLUTION INTRAMUSCULAR; INTRAVENOUS
Status: DISCONTINUED | OUTPATIENT
Start: 2025-07-06 | End: 2025-07-07 | Stop reason: SDUPTHER

## 2025-07-06 RX ORDER — DIAZEPAM 10 MG/2ML
20 INJECTION, SOLUTION INTRAMUSCULAR; INTRAVENOUS
Status: DISCONTINUED | OUTPATIENT
Start: 2025-07-06 | End: 2025-07-06

## 2025-07-06 RX ORDER — AMOXICILLIN 250 MG
2 CAPSULE ORAL 2 TIMES DAILY PRN
Status: DISCONTINUED | OUTPATIENT
Start: 2025-07-06 | End: 2025-07-10 | Stop reason: HOSPADM

## 2025-07-06 RX ORDER — POTASSIUM CHLORIDE 7.45 MG/ML
10 INJECTION INTRAVENOUS
Status: COMPLETED | OUTPATIENT
Start: 2025-07-06 | End: 2025-07-06

## 2025-07-06 RX ORDER — ALUMINA, MAGNESIA, AND SIMETHICONE 2400; 2400; 240 MG/30ML; MG/30ML; MG/30ML
15 SUSPENSION ORAL EVERY 6 HOURS PRN
Status: DISCONTINUED | OUTPATIENT
Start: 2025-07-06 | End: 2025-07-10 | Stop reason: HOSPADM

## 2025-07-06 RX ORDER — DIAZEPAM 5 MG/1
15 TABLET ORAL
Status: DISCONTINUED | OUTPATIENT
Start: 2025-07-06 | End: 2025-07-06

## 2025-07-06 RX ORDER — LORAZEPAM 1 MG/1
2 TABLET ORAL
Status: DISCONTINUED | OUTPATIENT
Start: 2025-07-06 | End: 2025-07-07 | Stop reason: SDUPTHER

## 2025-07-06 RX ORDER — DIAZEPAM 5 MG/1
20 TABLET ORAL
Status: DISCONTINUED | OUTPATIENT
Start: 2025-07-06 | End: 2025-07-06

## 2025-07-06 RX ORDER — PHENOBARBITAL 32.4 MG/1
32.4 TABLET ORAL ONCE
Status: COMPLETED | OUTPATIENT
Start: 2025-07-09 | End: 2025-07-09

## 2025-07-06 RX ORDER — MULTIPLE VITAMINS W/ MINERALS TAB 9MG-400MCG
1 TAB ORAL DAILY
Status: DISCONTINUED | OUTPATIENT
Start: 2025-07-06 | End: 2025-07-10 | Stop reason: HOSPADM

## 2025-07-06 RX ORDER — NITROGLYCERIN 0.4 MG/1
0.4 TABLET SUBLINGUAL
Status: DISCONTINUED | OUTPATIENT
Start: 2025-07-06 | End: 2025-07-10 | Stop reason: HOSPADM

## 2025-07-06 RX ORDER — DIAZEPAM 10 MG/2ML
15 INJECTION, SOLUTION INTRAMUSCULAR; INTRAVENOUS
Status: DISCONTINUED | OUTPATIENT
Start: 2025-07-06 | End: 2025-07-06

## 2025-07-06 RX ADMIN — DIAZEPAM 20 MG: 10 INJECTION, SOLUTION INTRAMUSCULAR; INTRAVENOUS at 10:41

## 2025-07-06 RX ADMIN — Medication 10 ML: at 08:08

## 2025-07-06 RX ADMIN — DIAZEPAM 20 MG: 10 INJECTION, SOLUTION INTRAMUSCULAR; INTRAVENOUS at 05:59

## 2025-07-06 RX ADMIN — DIAZEPAM 20 MG: 10 INJECTION, SOLUTION INTRAMUSCULAR; INTRAVENOUS at 09:48

## 2025-07-06 RX ADMIN — POTASSIUM CHLORIDE 10 MEQ: 7.46 INJECTION, SOLUTION INTRAVENOUS at 20:19

## 2025-07-06 RX ADMIN — Medication 220 MG: at 12:03

## 2025-07-06 RX ADMIN — CEFTRIAXONE 2000 MG: 2 INJECTION, POWDER, FOR SOLUTION INTRAMUSCULAR; INTRAVENOUS at 20:00

## 2025-07-06 RX ADMIN — PHENOBARBITAL SODIUM 260 MG: 130 INJECTION INTRAMUSCULAR; INTRAVENOUS at 15:36

## 2025-07-06 RX ADMIN — POTASSIUM CHLORIDE 10 MEQ: 7.46 INJECTION, SOLUTION INTRAVENOUS at 17:31

## 2025-07-06 RX ADMIN — PHENOBARBITAL SODIUM 422.5 MG: 130 INJECTION INTRAMUSCULAR; INTRAVENOUS at 11:56

## 2025-07-06 RX ADMIN — THIAMINE HYDROCHLORIDE 500 MG: 100 INJECTION, SOLUTION INTRAMUSCULAR; INTRAVENOUS at 14:05

## 2025-07-06 RX ADMIN — POTASSIUM CHLORIDE 10 MEQ: 7.46 INJECTION, SOLUTION INTRAVENOUS at 16:18

## 2025-07-06 RX ADMIN — LACTULOSE SOLUTION USP, 10 G/15 ML 30 G: 10 SOLUTION ORAL; RECTAL at 18:34

## 2025-07-06 RX ADMIN — LORAZEPAM 2 MG: 1 TABLET ORAL at 20:15

## 2025-07-06 RX ADMIN — Medication 10 ML: at 09:12

## 2025-07-06 RX ADMIN — MIDAZOLAM 4 MG: 1 INJECTION INTRAMUSCULAR; INTRAVENOUS at 12:29

## 2025-07-06 RX ADMIN — THIAMINE HYDROCHLORIDE 500 MG: 100 INJECTION, SOLUTION INTRAMUSCULAR; INTRAVENOUS at 21:00

## 2025-07-06 RX ADMIN — Medication 10 ML: at 20:00

## 2025-07-06 RX ADMIN — PREDNISOLONE SODIUM PHOSPHATE 40 MG: 15 SOLUTION ORAL at 12:05

## 2025-07-06 RX ADMIN — Medication 1 TABLET: at 20:02

## 2025-07-06 RX ADMIN — RIFAXIMIN 550 MG: 550 TABLET ORAL at 20:02

## 2025-07-06 RX ADMIN — THIAMINE HYDROCHLORIDE 200 MG: 100 INJECTION, SOLUTION INTRAMUSCULAR; INTRAVENOUS at 08:09

## 2025-07-06 RX ADMIN — DIAZEPAM 20 MG: 10 INJECTION, SOLUTION INTRAMUSCULAR; INTRAVENOUS at 08:36

## 2025-07-06 RX ADMIN — FOLIC ACID 1 MG: 5 INJECTION, SOLUTION INTRAMUSCULAR; INTRAVENOUS; SUBCUTANEOUS at 13:05

## 2025-07-06 RX ADMIN — LACTULOSE SOLUTION USP, 10 G/15 ML 30 G: 10 SOLUTION ORAL; RECTAL at 20:15

## 2025-07-06 RX ADMIN — PHENOBARBITAL SODIUM 260 MG: 130 INJECTION INTRAMUSCULAR; INTRAVENOUS at 19:22

## 2025-07-06 RX ADMIN — DIAZEPAM 20 MG: 10 INJECTION, SOLUTION INTRAMUSCULAR; INTRAVENOUS at 07:41

## 2025-07-06 RX ADMIN — POTASSIUM CHLORIDE 10 MEQ: 7.46 INJECTION, SOLUTION INTRAVENOUS at 18:55

## 2025-07-06 NOTE — CASE MANAGEMENT/SOCIAL WORK
Social Work Assessment   Duglas     Patient Name: Colt Luciano  MRN: [id number]  Today's Date: 7/6/2025    Admit Date: 7/6/2025         Discharge Plan       Row Name 07/06/25 1026       Plan    Plan Comments Per Chart review, Pt is disoriented from ETOH withdrawal, and not appropriate for assessment this date. LSW following.        ASHLIE MejiaW, MSW    Phone: [phone]  Fax: [phone]  Email: [email or web address]

## 2025-07-06 NOTE — NURSING NOTE
Patients luis Yanez called. He is the one who brought him to [facility] for confusion/disorientation. He said he does not know of any family members of the patient. His number is [phone].

## 2025-07-06 NOTE — PLAN OF CARE
Problem: Restraint, Nonviolent  Goal: Absence of Harm or Injury  Outcome: Not Progressing  Intervention: Implement Least Restrictive Safety Strategies  Recent Flowsheet Documentation  Taken 7/6/2025 1130 by Monika Luis RN  Medical Device Protection:   IV pole/bag removed from visual field   tubing secured  Diversional Activities: television  Taken 7/6/2025 1115 by Monika Luis RN  Medical Device Protection:   IV pole/bag removed from visual field   tubing secured  Diversional Activities: television  Taken 7/6/2025 1100 by Monika Luis RN  Medical Device Protection:   IV pole/bag removed from visual field   tubing secured  Diversional Activities: television  Taken 7/6/2025 1045 by Monika Luis RN  Medical Device Protection:   IV pole/bag removed from visual field   tubing secured  Diversional Activities: television  Taken 7/6/2025 1030 by Monika Luis RN  Medical Device Protection:   IV pole/bag removed from visual field   tubing secured  Diversional Activities: television  Taken 7/6/2025 1015 by Monika Luis RN  Medical Device Protection:   IV pole/bag removed from visual field   tubing secured  Diversional Activities: television  Taken 7/6/2025 1000 by Monika Luis RN  Medical Device Protection: IV pole/bag removed from visual field  Diversional Activities: television  Taken 7/6/2025 0945 by Monika Luis RN  Medical Device Protection: IV pole/bag removed from visual field  Diversional Activities: television  Taken 7/6/2025 0930 by Monika Luis RN  Medical Device Protection: IV pole/bag removed from visual field  Diversional Activities: television  Taken 7/6/2025 0915 by Monika Luis RN  Medical Device Protection:   IV pole/bag removed from visual field   tubing secured  Diversional Activities: television  Taken 7/6/2025 0900 by Monika Luis RN  Medical Device Protection:   IV pole/bag removed from visual field   tubing secured  Diversional Activities: television  Taken 7/6/2025 0845 by Monika Luis  RN  Medical Device Protection:   IV pole/bag removed from visual field   tubing secured  Diversional Activities: television  Taken 7/6/2025 0830 by Monika Luis RN  Medical Device Protection: IV pole/bag removed from visual field  Diversional Activities: television  Taken 7/6/2025 0815 by Monika Luis RN  Medical Device Protection: IV pole/bag removed from visual field  Diversional Activities: television  Taken 7/6/2025 0800 by Monika Luis RN  Medical Device Protection:   IV pole/bag removed from visual field   tubing secured  Diversional Activities: television  Taken 7/6/2025 0745 by Monika Luis RN  Medical Device Protection:   IV pole/bag removed from visual field   tubing secured  Diversional Activities: television  Taken 7/6/2025 0730 by Monika Luis RN  Medical Device Protection: IV pole/bag removed from visual field  Diversional Activities: television  Taken 7/6/2025 0715 by Monika Luis RN  Medical Device Protection:   IV pole/bag removed from visual field   tubing secured  Diversional Activities: television  Taken 7/6/2025 0700 by Monika Luis RN  Medical Device Protection:   IV pole/bag removed from visual field   tubing secured  Diversional Activities: television  Taken 7/6/2025 0645 by Monika Luis RN  Medical Device Protection:   IV pole/bag removed from visual field   tubing secured  Diversional Activities: television  Intervention: Protect Skin and Joint Integrity  Recent Flowsheet Documentation  Taken 7/6/2025 0845 by Monika Luis RN  Body Position: position changed independently  Range of Motion: active ROM (range of motion) encouraged   Goal Outcome Evaluation:

## 2025-07-06 NOTE — CONSULTS
GI CONSULT  NOTE:    Referring Provider:     Ann Kearns APRN     Chief complaint:    Hyperbilirubinemia    Subjective      History of present illness:     Patient is a 35-year-old Faroese-speaking male with a history of alcohol abuse was admitted to the hospital on 7/6/2025.  GI was consulted for elevated bilirubin.  His bilirubin is 14.5.  Patient has been seen in our office previously in April 2024 with a bilirubin of 30.    Patient sedated and history had to be obtained from review of the chart and speaking with nursing staff.    LABS: Maddrey discriminant function is 38.  Sodium 134, potassium 3.3, creatinine 0.66.  Total bilirubin 14.5, alk phos 213, , ALT 47.  Lipase 34.  INR 1.36.  WBCs 12.54, hemoglobin 13.2 and platelets 66.  No imaging here.    Endo History:   None patient is known to our group under Colt Galloway    Past Medical History:  No past medical history on file.    Past Surgical History:  No past surgical history on file.    Social History:       Family History:  No family history on file.    Medications:  No medications prior to admission.       Scheduled Meds:folic acid 1 mg in sodium chloride 0.9 % 50 mL IVPB, 1 mg, Intravenous, Daily  multivitamin with minerals, 1 tablet, Oral, Daily  PHENobarbital, 5 mg/kg, Intravenous, Once   Followed by  PHENobarbital, 3 mg/kg, Intravenous, Once   Followed by  PHENobarbital, 3 mg/kg, Intravenous, Once  [START ON 7/7/2025] PHENobarbital, 65 mg, Intravenous, Once   Followed by  [START ON 7/7/2025] PHENobarbital, 65 mg, Intravenous, Once   Followed by  [START ON 7/8/2025] PHENobarbital, 32.4 mg, Oral, Once   Followed by  [START ON 7/8/2025] PHENobarbital, 32.4 mg, Oral, Once   Followed by  [START ON 7/9/2025] PHENobarbital, 32.4 mg, Oral, Once  sodium chloride, 10 mL, Intravenous, Q12H  thiamine (B-1) IV, 500 mg, Intravenous, Q8H   Followed by  [START ON 7/9/2025] thiamine (B-1) IV, 200 mg, Intravenous, Q8H   Followed by  [START ON  7/13/2025] thiamine, 100 mg, Oral, Daily      Continuous Infusions:   PRN Meds:.  aluminum-magnesium hydroxide-simethicone    senna-docusate sodium **AND** polyethylene glycol **AND** bisacodyl **AND** bisacodyl    LORazepam **OR** midazolam **OR** LORazepam **OR** midazolam **OR** midazolam **OR** midazolam    Magnesium Standard Dose Replacement - Follow Nurse / BPA Driven Protocol    sodium chloride    sodium chloride    ALLERGIES:  Patient has no known allergies.    ROS:  Review of Systems   Unable to perform ROS: Mental status change        Objective resting in bed.  Not responding.  Sitter at bedside.    Vital Signs:   Vitals:    07/06/25 0612 07/06/25 0751 07/06/25 0900 07/06/25 1102   BP: 103/71 119/65  105/66   BP Location: Left arm Left leg  Left leg   Patient Position: Lying Lying  Lying   Pulse: (!) 146 (!) 140     Resp: 17   17   SpO2: 94% 93%  95%   Weight:   83.1 kg (183 lb 3.2 oz)        Physical Exam:    General Appearance:  Ill-appearing in no acute distress   Head:    Normocephalic, without obvious abnormality, atraumatic   Eyes:            Conjunctivae normal, icteric sclerae, pupils equal       Throat:   No oral lesions, no thrush, oral mucosa moist   Neck:   Supple, no JVD   Lungs:      respirations regular, even and unlabored        Chest Wall:    No abnormalities observed   Abdomen:      soft, nontender, no rebound or guarding, nondistended, obese abdomen   Rectal:     Deferred   Extremities:   Moves all extremities, 2+ edema, no cyanosis   Pulses:   Pulses palpable and equal bilaterally   Skin:   No rash, + jaundice, normal palpation        Results Review:   I reviewed the patient's labs and imaging.  CBC    Results from last 7 days   Lab Units 07/06/25  0620   WBC 10*3/mm3 12.54*   HEMOGLOBIN g/dL 13.2   PLATELETS 10*3/mm3 66*     CMP   Results from last 7 days   Lab Units 07/06/25  0620   SODIUM mmol/L 134*   POTASSIUM mmol/L 3.3*   CHLORIDE mmol/L 97*   CO2 mmol/L 18.5*   BUN mg/dL 23.6*  "  CREATININE mg/dL 1.66*   GLUCOSE mg/dL 90   ALBUMIN g/dL 3.4*   BILIRUBIN mg/dL 14.5*   ALK PHOS U/L 213*   AST (SGOT) U/L 121*   ALT (SGPT) U/L 47*   MAGNESIUM mg/dL 2.0   LIPASE U/L 39     Cr Clearance CrCl cannot be calculated (Unknown ideal weight.).  Coag   Results from last 7 days   Lab Units 07/06/25  0620   INR  1.36*     HbA1C No results found for: \"HGBA1C\"  Blood Glucose No results found for: \"POCGLU\"  Infection     UA      Radiology(recent) No radiology results for the last day      ASSESSMENT:  Alcohol hepatitis  Elevated LFTs related to above  Alcohol withdrawal  Electrolyte abnormalities  Thrombocytopenia due to alcohol use      PLAN:  Patient admitted into the hospital 7/6/2025.  GI was consulted for elevated bilirubin.  History of alcohol abuse with withdrawal.    [facility] discriminant function is 38.  Will order prednisolone for alcoholic hepatitis.  Will order liver ultrasound to evaluate for signs of cirrhosis or other causes of his elevated LFTs.  [facility] protocol.  Thiamine, folic acid and zinc due to alcohol abuse.   High-protein diet when allowed/appropriate.  Place Dobbhoff tube for lactulose, Xifaxan and tube feeds.         I discussed the patient's findings and my recommendations with the patient.  RYAN Chapin  07/06/25  11:12 EDT    Time:         "

## 2025-07-06 NOTE — SIGNIFICANT NOTE
Patient noted to have concern for withdrawal symptoms on arrival, hospitalist APRN notified and had started CIWA protocol. Pt has since received 40mg diazepam and continues to have significant withdrawal symptoms, CIWA 24 and requiring restraints. Discussed with ICU provider on call who will evaluate for transfer to higher level of care, appreciate assistance.

## 2025-07-06 NOTE — CONSULTS
Referring Provider:     Ann Kearns APRN      Reason for Consultation: alcohol withdrawal     Chief complaint Pt was unable to voice pertinent complaint secondary to confusion    Subjective .     History of present illness:  The patient is a 35 y.o. male who was admitted secondary to history of alcohol use and acute alcohol withdrawal     Psychiatry consulted secondary to alcohol withdrawal.   Pt is pleasantly confused- Oriented to self, believes he is in [facility].   not agitated -Not aggressive- no threats.  Reports daily alcohol use -drinking 10-15 beers per day.The pt is a limited historian secondary to confusion   RN reported CIWA with phenobarbital and BZD - reported as effective for managing sxs of intermittent agitation and confusion. No threatening behaviors- pt no longer requiring restraints for agitation      The following portions of the patient's history were reviewed and updated as appropriate: allergies, current medications, past family history, past medical history, past social history, past surgical history and problem list.    History    Objective     Vital Signs   /74 (BP Location: Right arm, Patient Position: Lying)   Pulse 116   Temp 97.3 °F (36.3 °C) (Oral)   Resp 23   Wt 77.5 kg (170 lb 13.7 oz)   SpO2 96%       Mental Status Exam:   Hygiene:   fair  Cooperation:  attempting to be cooperative   Eye Contact:  Fair  Psychomotor Behavior:  Slow  Affect:  Appropriate  Mood: pleasant   Speech:  Minimal  Thought Process:  Unable to demonstrate  Thought Content:  Unable to demonstrate  Suicidal:  None  Homicidal:  None  Hallucinations:  Not demonstrated today  Delusion:  Unable to demonstrate  Memory:  Deficits  Orientation:  Person, vaguely to state   Reliability:  poor  Insight:  Poor  Judgement:  Impaired  Impulse Control:  Fair            Assessment & Plan       * No active hospital problems. *       Assessment: Alcohol dependence, and withdrawal   Treatment Plan: The  pt presents with alcohol dependence and withdrawal.   Continue CIWA with phenobarbital per hospitalist. Will evaluate cessation and appropriate treatment options when pt is able to participate in discussion -SW following    Will follow   Treatment Plan discussed with: Patient      I discussed the patients findings and my recommendations with patient and nursing staff    I have reviewed and approved the behavioral health treatment plans and problem list. Yes  Thank you for the consult   Referring MD has access to consult report and progress notes in EMR     Shaina Quiñones DNP, APRN  07/06/25  13:37 EDT

## 2025-07-06 NOTE — H&P
Critical Care History and Physical     Colt Luciano : 1990 MRN:[id number] LOS:0 ROOM: [facility] [place][note 2[place]     Reason for admission: Alcohol withdrawal delirium     Assessment / Plan     Acute toxic / metabolic encephalopathy  Likely secondary to acute alcohol withdrawal syndrome  EtOH level <10  Urine tox screen pending  Ammonia level pending  CT head pending  Phenobarbital per [facility] protocol contributing to drowsiness currently  Supportive care for now.     Alcohol abuse syndrome  Acute alcohol withdrawal syndrome  Monitor with [facility] protocol  Started phenobarbital taper per EtOH withdrawal protocol    Acute liver injury on chronic, alcohol induced liver disease  Liver ultrasound reviewed: Hepatomegaly with likely mild to moderate severity hepatic steatosis, no visualized ascites, hepatopetal flow with main portal vein  INR 1.36  Acute hepatitis panel pending  GI consulted  Continue rifaximin    Leukocytosis  Blood cultures pending  Urine antigens for Legionella and strep pneumo pending  Urinalysis with reflex culture pending  Chest x-ray pending  Procalcitonin  Liver ultrasound did not reveal ascites, not likely SBP      Code Status (Patient has no pulse and is not breathing): CPR (Attempt to Resuscitate)  Medical Interventions (Patient has pulse or is breathing): Full Support  Level Of Support Discussed With: Patient       Nutrition:   NPO Diet NPO Type: Strict NPO     VTE Prophylaxis:  Mechanical VTE prophylaxis orders are present.         History of Present illness     Colt Luciano is a 35 y.o. male with PMH of alcohol abuse disorder [facility] and was admitted with a principal diagnosis of Alcohol withdrawal delirium.  The following information has been obtained primarily from review of documentation and collaboration with the hospitalist as the patient is confused at the time of admission to the ICU.  The patient reportedly presented to the ED of an [facility]  overnight for evaluation of increasing tremors, anxiousness, and abdominal pain.  The patient reported that he usually drinks 8-12 beers per day however quit drinking about 4 days ago.  Friends and his boss noted that he was having increased shaking and brought him to the ED.  Findings in the ED ED were consistent with acute kidney injury and acute liver injury.  CT of the abdomen revealed hepatomegaly with steatosis and nodular changes consistent with evolving cirrhosis.  Transfer to [facility] was requested for higher level medical management as well as subspecialty availability.  The patient was initially excepted by the hospitalist for admission to the floor however the patient quickly developed fulminant alcohol withdrawal requiring large doses of Valium and physical restraints for his safety.  The patient was then transferred to the ICU for aggressive medical management and Precedex infusion to facilitate patient care.  The patient is exclusively Yoruba-speaking and provided some information via electronic       Of note, this patient is marked for a merged chart.  The alternate name is Colt Montano medical record #[id number]    ACP: No ACP documentation on file    Patient was seen and examined on 07/06/25 at 16:19 EDT .      Past Medical/Surgical/Social/Family History & Allergies     No past medical history on file.   No past surgical history on file.   Social History     Socioeconomic History    Marital status: Single      No family history on file.   No Known Allergies   Social Drivers of Health     Tobacco Use: Not on file   Alcohol Use: Not on file   Financial Resource Strain: Not on file   Food Insecurity: Not on file   Transportation Needs: Not on file   Physical Activity: Not on file   Stress: Not on file   Social Connections: Not on file   Interpersonal Safety: Not on file   Depression: Not on file   Housing Stability: Not on file   Utilities: Not on file   Health Literacy: Not on  file   Employment: Not on file   Disabilities: Not on file        Home Medications     Prior to Admission medications    Not on File        Objective / Physical Exam     Vital signs:  Temp: 97.6 °F (36.4 °C)  BP: 103/61  Heart Rate: 94  Resp: 23  SpO2: 99 %  Weight: 77.5 kg (170 lb 13.7 oz)    Admission Weight: Weight: 83.1 kg (183 lb 3.2 oz)    Physical Exam  Vitals and nursing note reviewed.   Constitutional:       General: He is not in acute distress.     Appearance: He is ill-appearing.      Comments: Agitated at times   HENT:      Head: Normocephalic and atraumatic.      Right Ear: External ear normal.      Left Ear: External ear normal.      Nose: Nose normal.   Eyes:      General: Scleral icterus present.         Right eye: No discharge.         Left eye: No discharge.      Pupils: Pupils are equal, round, and reactive to light.   Neck:      Comments: Trachea midline  No JVD  Cardiovascular:      Heart sounds: Normal heart sounds, S1 normal and S2 normal. No murmur heard.     Comments: Sinus rhythm  Pulmonary:      Effort: Pulmonary effort is normal. No respiratory distress.      Breath sounds: Normal breath sounds. No wheezing or rhonchi.   Abdominal:      General: There is no distension.      Palpations: Abdomen is soft.      Comments: Bowel sounds hyperactive   Musculoskeletal:      Cervical back: Neck supple.      Right lower leg: No edema.      Left lower leg: No edema.   Skin:     General: Skin is warm and dry.      Coloration: Skin is jaundiced.      Comments: Scattered areas of ecchymosis on the extremities   Neurological:      Comments: Interview per electronic   Confusion, no insight into current situation  No lateralizing deficits   Psychiatric:      Comments: Seems anxious and agitated at times          Labs     Results from last 7 days   Lab Units 07/06/25  0620   WBC 10*3/mm3 12.54*   HEMOGLOBIN g/dL 13.2   HEMATOCRIT % 38.2   PLATELETS 10*3/mm3 66*      Results from last 7 days   Lab  Units 07/06/25  1404 07/06/25  0620   SODIUM mmol/L 133* 134*   POTASSIUM mmol/L 3.0* 3.3*   CHLORIDE mmol/L 99 97*   CO2 mmol/L 19.7* 18.5*   ANION GAP mmol/L 14.3 18.5*   BUN mg/dL 23.0* 23.6*   CREATININE mg/dL 1.10 1.66*   GLUCOSE mg/dL 85 90   PHOSPHORUS mg/dL 2.5  --    MAGNESIUM mg/dL 2.3 2.0   ALT (SGPT) U/L  --  47*   AST (SGOT) U/L  --  121*   ALK PHOS U/L  --  213*            Imaging     Chest X ray: Chest x-ray not indicated thus far this admission    EKG: pending    Current Medications     Scheduled Meds:  folic acid 1 mg in sodium chloride 0.9 % 50 mL IVPB, 1 mg, Intravenous, Daily  lactulose, 30 g, Oral, TID  multivitamin with minerals, 1 tablet, Oral, Daily  PHENobarbital, 3 mg/kg, Intravenous, Once  [START ON 7/7/2025] PHENobarbital, 65 mg, Intravenous, Once   Followed by  [START ON 7/7/2025] PHENobarbital, 65 mg, Intravenous, Once   Followed by  [START ON 7/8/2025] PHENobarbital, 32.4 mg, Oral, Once   Followed by  [START ON 7/8/2025] PHENobarbital, 32.4 mg, Oral, Once   Followed by  [START ON 7/9/2025] PHENobarbital, 32.4 mg, Oral, Once  potassium chloride, 10 mEq, Intravenous, Q1H  prednisoLONE sodium phosphate, 40 mg, Oral, Daily  sodium chloride, 10 mL, Intravenous, Q12H  thiamine (B-1) IV, 500 mg, Intravenous, Q8H   Followed by  [START ON 7/9/2025] thiamine (B-1) IV, 200 mg, Intravenous, Q8H   Followed by  [START ON 7/13/2025] thiamine, 100 mg, Oral, Daily  zinc sulfate, 220 mg, Oral, Daily         Continuous Infusions:          RYAN Solomon   Critical Care  07/06/25   16:19 EDT

## 2025-07-06 NOTE — NURSING NOTE
Patient arrived to floor per EMS stretcher around 0545.  Patient was combative and having withdrawal symptoms.  Blood noted to right side of head around ear.  Appears to be from scab on face.  Valium given per CIWA protocol.  Soft  wrist restraints applied around 0554.  Patient speaks Welsh but was not making sense to Welsh speaking nursing assistant who tried to communicate.  APRN notified and orders received.

## 2025-07-07 LAB
ALBUMIN SERPL-MCNC: 3.1 G/DL (ref 3.5–5.2)
ALBUMIN/GLOB SERPL: 1 G/DL
ALP SERPL-CCNC: 174 U/L (ref 39–117)
ALT SERPL W P-5'-P-CCNC: 56 U/L (ref 1–41)
ANION GAP SERPL CALCULATED.3IONS-SCNC: 12.7 MMOL/L (ref 5–15)
AST SERPL-CCNC: 135 U/L (ref 1–40)
BASOPHILS # BLD AUTO: 0.01 10*3/MM3 (ref 0–0.2)
BASOPHILS NFR BLD AUTO: 0.1 % (ref 0–1.5)
BILIRUB SERPL-MCNC: 12.6 MG/DL (ref 0–1.2)
BUN SERPL-MCNC: 28.3 MG/DL (ref 6–20)
BUN/CREAT SERPL: 30.4 (ref 7–25)
CALCIUM SPEC-SCNC: 8.2 MG/DL (ref 8.6–10.5)
CHLORIDE SERPL-SCNC: 107 MMOL/L (ref 98–107)
CO2 SERPL-SCNC: 18.3 MMOL/L (ref 22–29)
CREAT SERPL-MCNC: 0.93 MG/DL (ref 0.76–1.27)
DEPRECATED RDW RBC AUTO: 53.3 FL (ref 37–54)
EGFRCR SERPLBLD CKD-EPI 2021: 111.2 ML/MIN/1.73
EOSINOPHIL # BLD AUTO: 0 10*3/MM3 (ref 0–0.4)
EOSINOPHIL NFR BLD AUTO: 0 % (ref 0.3–6.2)
ERYTHROCYTE [DISTWIDTH] IN BLOOD BY AUTOMATED COUNT: 15.3 % (ref 12.3–15.4)
GLOBULIN UR ELPH-MCNC: 3.2 GM/DL
GLUCOSE BLDC GLUCOMTR-MCNC: 114 MG/DL (ref 70–105)
GLUCOSE BLDC GLUCOMTR-MCNC: 129 MG/DL (ref 70–105)
GLUCOSE SERPL-MCNC: 84 MG/DL (ref 65–99)
HCT VFR BLD AUTO: 35.6 % (ref 37.5–51)
HGB BLD-MCNC: 12.1 G/DL (ref 13–17.7)
IMM GRANULOCYTES # BLD AUTO: 0.04 10*3/MM3 (ref 0–0.05)
IMM GRANULOCYTES NFR BLD AUTO: 0.4 % (ref 0–0.5)
INR PPP: 1.25 (ref 0.9–1.1)
LYMPHOCYTES # BLD AUTO: 0.97 10*3/MM3 (ref 0.7–3.1)
LYMPHOCYTES NFR BLD AUTO: 10.3 % (ref 19.6–45.3)
MAGNESIUM SERPL-MCNC: 2.6 MG/DL (ref 1.6–2.6)
MCH RBC QN AUTO: 32 PG (ref 26.6–33)
MCHC RBC AUTO-ENTMCNC: 34 G/DL (ref 31.5–35.7)
MCV RBC AUTO: 94.2 FL (ref 79–97)
MONOCYTES # BLD AUTO: 0.89 10*3/MM3 (ref 0.1–0.9)
MONOCYTES NFR BLD AUTO: 9.4 % (ref 5–12)
NEUTROPHILS NFR BLD AUTO: 7.55 10*3/MM3 (ref 1.7–7)
NEUTROPHILS NFR BLD AUTO: 79.8 % (ref 42.7–76)
NRBC BLD AUTO-RTO: 0 /100 WBC (ref 0–0.2)
PLATELET # BLD AUTO: 75 10*3/MM3 (ref 140–450)
PMV BLD AUTO: 11.7 FL (ref 6–12)
POTASSIUM SERPL-SCNC: 3.6 MMOL/L (ref 3.5–5.2)
PROT SERPL-MCNC: 6.3 G/DL (ref 6–8.5)
PROTHROMBIN TIME: 15.7 SECONDS (ref 11.7–14.2)
RBC # BLD AUTO: 3.78 10*6/MM3 (ref 4.14–5.8)
SODIUM SERPL-SCNC: 138 MMOL/L (ref 136–145)
WBC NRBC COR # BLD AUTO: 9.46 10*3/MM3 (ref 3.4–10.8)

## 2025-07-07 PROCEDURE — 82948 REAGENT STRIP/BLOOD GLUCOSE: CPT

## 2025-07-07 PROCEDURE — 25010000002 PHENOBARBITAL PER 120 MG: Performed by: NURSE PRACTITIONER

## 2025-07-07 PROCEDURE — 83735 ASSAY OF MAGNESIUM: CPT | Performed by: NURSE PRACTITIONER

## 2025-07-07 PROCEDURE — 25010000002 FOLIC ACID 5 MG/ML SOLUTION 10 ML VIAL: Performed by: NURSE PRACTITIONER

## 2025-07-07 PROCEDURE — 25010000002 MIDAZOLAM PER 1 MG

## 2025-07-07 PROCEDURE — 85610 PROTHROMBIN TIME: CPT | Performed by: NURSE PRACTITIONER

## 2025-07-07 PROCEDURE — 25010000002 MIDAZOLAM PER 1 MG: Performed by: NURSE PRACTITIONER

## 2025-07-07 PROCEDURE — 63710000001 PREDNISOLONE PER 5 MG: Performed by: NURSE PRACTITIONER

## 2025-07-07 PROCEDURE — 25010000002 HYDROMORPHONE PER 4 MG

## 2025-07-07 PROCEDURE — 80053 COMPREHEN METABOLIC PANEL: CPT | Performed by: NURSE PRACTITIONER

## 2025-07-07 PROCEDURE — 25010000002 THIAMINE PER 100 MG: Performed by: NURSE PRACTITIONER

## 2025-07-07 PROCEDURE — 85025 COMPLETE CBC W/AUTO DIFF WBC: CPT | Performed by: NURSE PRACTITIONER

## 2025-07-07 RX ORDER — LORAZEPAM 1 MG/1
2 TABLET ORAL
Status: DISCONTINUED | OUTPATIENT
Start: 2025-07-07 | End: 2025-07-10 | Stop reason: HOSPADM

## 2025-07-07 RX ORDER — HYDROMORPHONE HYDROCHLORIDE 1 MG/ML
0.5 INJECTION, SOLUTION INTRAMUSCULAR; INTRAVENOUS; SUBCUTANEOUS
Refills: 0 | Status: DISCONTINUED | OUTPATIENT
Start: 2025-07-07 | End: 2025-07-10 | Stop reason: HOSPADM

## 2025-07-07 RX ORDER — MIDAZOLAM HYDROCHLORIDE 1 MG/ML
4 INJECTION, SOLUTION INTRAMUSCULAR; INTRAVENOUS
Status: DISCONTINUED | OUTPATIENT
Start: 2025-07-07 | End: 2025-07-10 | Stop reason: HOSPADM

## 2025-07-07 RX ORDER — LOPERAMIDE HYDROCHLORIDE 2 MG/1
2 CAPSULE ORAL 4 TIMES DAILY PRN
Status: DISCONTINUED | OUTPATIENT
Start: 2025-07-07 | End: 2025-07-10 | Stop reason: HOSPADM

## 2025-07-07 RX ORDER — DEXMEDETOMIDINE HYDROCHLORIDE 4 UG/ML
.2-1.5 INJECTION, SOLUTION INTRAVENOUS
Status: DISCONTINUED | OUTPATIENT
Start: 2025-07-07 | End: 2025-07-07

## 2025-07-07 RX ORDER — FAMOTIDINE 20 MG/1
20 TABLET, FILM COATED ORAL
Status: DISCONTINUED | OUTPATIENT
Start: 2025-07-08 | End: 2025-07-10 | Stop reason: HOSPADM

## 2025-07-07 RX ORDER — MIDAZOLAM HYDROCHLORIDE 1 MG/ML
2 INJECTION, SOLUTION INTRAMUSCULAR; INTRAVENOUS
Status: DISCONTINUED | OUTPATIENT
Start: 2025-07-07 | End: 2025-07-10 | Stop reason: HOSPADM

## 2025-07-07 RX ORDER — POTASSIUM CHLORIDE 1500 MG/1
40 TABLET, EXTENDED RELEASE ORAL EVERY 4 HOURS
Status: COMPLETED | OUTPATIENT
Start: 2025-07-07 | End: 2025-07-07

## 2025-07-07 RX ORDER — LORAZEPAM 1 MG/1
1 TABLET ORAL
Status: DISCONTINUED | OUTPATIENT
Start: 2025-07-07 | End: 2025-07-10 | Stop reason: HOSPADM

## 2025-07-07 RX ADMIN — Medication 1 TABLET: at 10:27

## 2025-07-07 RX ADMIN — POTASSIUM CHLORIDE 40 MEQ: 1500 TABLET, EXTENDED RELEASE ORAL at 04:00

## 2025-07-07 RX ADMIN — PHENOBARBITAL SODIUM 65 MG: 65 INJECTION, SOLUTION INTRAMUSCULAR; INTRAVENOUS at 05:04

## 2025-07-07 RX ADMIN — THIAMINE HYDROCHLORIDE 500 MG: 100 INJECTION, SOLUTION INTRAMUSCULAR; INTRAVENOUS at 05:05

## 2025-07-07 RX ADMIN — RIFAXIMIN 550 MG: 550 TABLET ORAL at 10:27

## 2025-07-07 RX ADMIN — MIDAZOLAM 4 MG: 1 INJECTION INTRAMUSCULAR; INTRAVENOUS at 22:44

## 2025-07-07 RX ADMIN — LORAZEPAM 2 MG: 1 TABLET ORAL at 20:27

## 2025-07-07 RX ADMIN — LACTULOSE SOLUTION USP, 10 G/15 ML 30 G: 10 SOLUTION ORAL; RECTAL at 10:27

## 2025-07-07 RX ADMIN — MIDAZOLAM 4 MG: 1 INJECTION INTRAMUSCULAR; INTRAVENOUS at 23:29

## 2025-07-07 RX ADMIN — PREDNISOLONE SODIUM PHOSPHATE 40 MG: 15 SOLUTION ORAL at 10:28

## 2025-07-07 RX ADMIN — Medication 10 ML: at 09:00

## 2025-07-07 RX ADMIN — Medication 10 ML: at 20:27

## 2025-07-07 RX ADMIN — LACTULOSE SOLUTION USP, 10 G/15 ML 30 G: 10 SOLUTION ORAL; RECTAL at 20:27

## 2025-07-07 RX ADMIN — MIDAZOLAM 4 MG: 1 INJECTION INTRAMUSCULAR; INTRAVENOUS at 02:15

## 2025-07-07 RX ADMIN — Medication 220 MG: at 10:28

## 2025-07-07 RX ADMIN — POTASSIUM CHLORIDE 40 MEQ: 1500 TABLET, EXTENDED RELEASE ORAL at 00:35

## 2025-07-07 RX ADMIN — THIAMINE HYDROCHLORIDE 500 MG: 100 INJECTION, SOLUTION INTRAMUSCULAR; INTRAVENOUS at 15:27

## 2025-07-07 RX ADMIN — HYDROMORPHONE HYDROCHLORIDE 0.5 MG: 1 INJECTION, SOLUTION INTRAMUSCULAR; INTRAVENOUS; SUBCUTANEOUS at 02:55

## 2025-07-07 RX ADMIN — PHENOBARBITAL SODIUM 65 MG: 65 INJECTION INTRAMUSCULAR; INTRAVENOUS at 18:57

## 2025-07-07 RX ADMIN — MIDAZOLAM 4 MG: 1 INJECTION INTRAMUSCULAR; INTRAVENOUS at 00:40

## 2025-07-07 RX ADMIN — LACTULOSE SOLUTION USP, 10 G/15 ML 30 G: 10 SOLUTION ORAL; RECTAL at 16:49

## 2025-07-07 RX ADMIN — POTASSIUM CHLORIDE 40 MEQ: 1500 TABLET, EXTENDED RELEASE ORAL at 10:28

## 2025-07-07 RX ADMIN — RIFAXIMIN 550 MG: 550 TABLET ORAL at 20:27

## 2025-07-07 RX ADMIN — PANTOPRAZOLE SODIUM 40 MG: 40 INJECTION, POWDER, FOR SOLUTION INTRAVENOUS at 05:05

## 2025-07-07 RX ADMIN — THIAMINE HYDROCHLORIDE 500 MG: 100 INJECTION, SOLUTION INTRAMUSCULAR; INTRAVENOUS at 22:02

## 2025-07-07 RX ADMIN — LORAZEPAM 1 MG: 1 TABLET ORAL at 10:28

## 2025-07-07 RX ADMIN — FOLIC ACID 1 MG: 5 INJECTION, SOLUTION INTRAMUSCULAR; INTRAVENOUS; SUBCUTANEOUS at 10:27

## 2025-07-07 NOTE — PROGRESS NOTES
LOS: 1 day   Patient Care Team:  Provider, No Known as PCP - General      Subjective     Interval History:     Subjective: Patient denies abdominal pain.  Patient continues to have sitter at bedside due to altered mental status.  No nausea or vomiting.  Patient more awake today.      ROS:   No chest pain, shortness of breath, or cough.        Medication Review:     Current Facility-Administered Medications:     aluminum-magnesium hydroxide-simethicone (MAALOX MAX) 400-400-40 MG/5ML suspension 15 mL, 15 mL, Oral, Q6H PRN, Ann Kearns, APRN    sennosides-docusate (PERICOLACE) 8.6-50 MG per tablet 2 tablet, 2 tablet, Oral, BID PRN **AND** polyethylene glycol (MIRALAX) packet 17 g, 17 g, Oral, Daily PRN **AND** bisacodyl (DULCOLAX) EC tablet 5 mg, 5 mg, Oral, Daily PRN **AND** bisacodyl (DULCOLAX) suppository 10 mg, 10 mg, Rectal, Daily PRN, Ann Kearns, APRN    Calcium Replacement - Follow Nurse / BPA Driven Protocol, , Not Applicable, PRN, Bryan De La Garza DO    cefTRIAXone (ROCEPHIN) 2,000 mg in sodium chloride 0.9 % 100 mL MBP, 2,000 mg, Intravenous, Q24H, Day, Lucila G, APRN, Last Rate: 200 mL/hr at 07/06/25 2000, 2,000 mg at 07/06/25 2000    dexmedetomidine (PRECEDEX) 400 mcg in 100 mL NS infusion, 0.2-1.5 mcg/kg/hr, Intravenous, Titrated, Angelique López APRN    folic acid 1 mg in sodium chloride 0.9 % 50 mL IVPB, 1 mg, Intravenous, Daily, Day, Dawn G, APRN, Last Rate: 100 mL/hr at 07/06/25 1305, 1 mg at 07/06/25 1305    HYDROmorphone (DILAUDID) injection 0.5 mg, 0.5 mg, Intravenous, Q2H PRN, Angelique López APRN, 0.5 mg at 07/07/25 0255    lactulose (CHRONULAC) 10 GM/15ML solution 30 g, 30 g, Oral, TID, Caitlin Alvarado, APRN, 30 g at 07/06/25 2015    LORazepam (ATIVAN) tablet 1 mg, 1 mg, Oral, Q1H PRN **OR** midazolam (VERSED) injection 2 mg, 2 mg, Intravenous, Q1H PRN **OR** LORazepam (ATIVAN) tablet 2 mg, 2 mg, Oral, Q1H PRN, 2 mg at 07/06/25 2015 **OR** midazolam (VERSED)  injection 4 mg, 4 mg, Intravenous, Q1H PRN, 4 mg at 07/07/25 0215 **OR** midazolam (VERSED) injection 4 mg, 4 mg, Intravenous, Q15 Min PRN, 4 mg at 07/06/25 1229 **OR** midazolam (VERSED) injection 4 mg, 4 mg, Intramuscular, Q15 Min PRN, Day, Lucila G, APRN    Magnesium Standard Dose Replacement - Follow Nurse / BPA Driven Protocol, , Not Applicable, PRN, Ann Kearns, APRN    Magnesium Standard Dose Replacement - Follow Nurse / BPA Driven Protocol, , Not Applicable, PRN, Bryan De La Garza, DO    multivitamin with minerals 1 tablet, 1 tablet, Oral, Daily, Day, Dawn G, APRN, 1 tablet at 07/06/25 2002    nitroglycerin (NITROSTAT) SL tablet 0.4 mg, 0.4 mg, Sublingual, Q5 Min PRN, Bryan De La Garza,     pantoprazole (PROTONIX) injection 40 mg, 40 mg, Intravenous, Q AM, Day, Dawn G, APRN, 40 mg at 07/07/25 0505    [COMPLETED] PHENobarbital injection 65 mg, 65 mg, Intravenous, Once, 65 mg at 07/07/25 0504 **FOLLOWED BY** PHENobarbital injection 65 mg, 65 mg, Intravenous, Once **FOLLOWED BY** [START ON 7/8/2025] PHENobarbital tablet 32.4 mg, 32.4 mg, Oral, Once **FOLLOWED BY** [START ON 7/8/2025] PHENobarbital tablet 32.4 mg, 32.4 mg, Oral, Once **FOLLOWED BY** [START ON 7/9/2025] PHENobarbital tablet 32.4 mg, 32.4 mg, Oral, Once, Day, Lucila G, APRN    Phosphorus Replacement - Follow Nurse / BPA Driven Protocol, , Not Applicable, PRN, Bryan De La Garza, DO    potassium chloride (KLOR-CON M20) CR tablet 40 mEq, 40 mEq, Oral, Q4H, Bryan De La Garza DO, 40 mEq at 07/07/25 0400    Potassium Replacement - Follow Nurse / BPA Driven Protocol, , Not Applicable, PRN, [place], Christopher L, DO    prednisoLONE sodium phosphate (ORAPRED) 15 MG/5ML oral solution 40 mg, 40 mg, Oral, Daily, Caitlin Alvarado APRN, 40 mg at 07/06/25 1205    riFAXIMin (XIFAXAN) tablet 550 mg, 550 mg, Oral, Q12H, Caitlin Alvarado APRN, 550 mg at 07/06/25 2002    sodium chloride 0.9 % flush 10 mL, 10 mL, Intravenous, Q12H, Som,  RYAN Juarez, 10 mL at 07/06/25 2000    sodium chloride 0.9 % flush 10 mL, 10 mL, Intravenous, PRN, Ann Kearns APRN, 10 mL at 07/06/25 0808    sodium chloride 0.9 % infusion 40 mL, 40 mL, Intravenous, PRN, Ann Kearns APRN    thiamine (B-1) 500 mg in sodium chloride 0.9 % 100 mL IVPB, 500 mg, Intravenous, Q8H, Last Rate: 200 mL/hr at 07/07/25 0505, 500 mg at 07/07/25 0505 **FOLLOWED BY** [START ON 7/9/2025] thiamine (B-1) injection 200 mg, 200 mg, Intravenous, Q8H **FOLLOWED BY** [START ON 7/13/2025] thiamine (VITAMIN B-1) tablet 100 mg, 100 mg, Oral, Daily, Day, RYAN Jovel    zinc sulfate (ZINCATE) capsule 220 mg, 220 mg, Oral, Daily, Caitlin Alvarado APRN, 220 mg at 07/06/25 1203      Objective     Vital Signs  Vitals:    07/07/25 0700 07/07/25 0715 07/07/25 0730 07/07/25 0800   BP: 97/56 101/52 97/53 95/53   BP Location:       Patient Position:       Pulse: 86 85 83 85   Resp:       Temp:    97.8 °F (36.6 °C)   TempSrc:    Oral   SpO2: 100% 100% 100% 100%   Weight:       Height:           Physical Exam:     General Appearance:  Sleepy but able to arouse with verbal stimuli, in no acute distress   Head:    Normocephalic, without obvious abnormality   Eyes:          Conjunctivae normal, anicteric sclera       Neck:   supple, no JVD   Lungs:     respirations regular, even and unlabored, 3 L nasal cannula   Abdomen:     Soft, non-tender, no rebound or guarding, non-distended       Extremities:   No edema, no cyanosis   Skin:   No bruising or rash, + jaundice        Results Review:    Results from last 7 days   Lab Units 07/07/25  0522 07/06/25  0620   WBC 10*3/mm3 9.46 12.54*   HEMOGLOBIN g/dL 12.1* 13.2   PLATELETS 10*3/mm3 75* 66*     Results from last 7 days   Lab Units 07/07/25  0522 07/06/25  1936 07/06/25  1404 07/06/25  0620   SODIUM mmol/L 138  --  133* 134*   POTASSIUM mmol/L 3.6 3.0* 3.0* 3.3*   CHLORIDE mmol/L 107  --  99 97*   CO2 mmol/L 18.3*  --  19.7* 18.5*   BUN mg/dL 28.3*   "--  23.0* 23.6*   CREATININE mg/dL 0.93  --  1.10 1.66*   GLUCOSE mg/dL 84  --  85 90   ALBUMIN g/dL 3.1*  --   --  3.4*   BILIRUBIN mg/dL 12.6*  --   --  14.5*   ALK PHOS U/L 174*  --   --  213*   AST (SGOT) U/L 135*  --   --  121*   ALT (SGPT) U/L 56*  --   --  47*   INR  1.25*  --   --  1.36*   MAGNESIUM mg/dL 2.6  --  2.3 2.0   PHOSPHORUS mg/dL  --   --  2.5  --    LIPASE U/L  --   --   --  39   AMMONIA umol/L  --  37  --   --      Estimated Creatinine Clearance: 109.9 mL/min (by C-G formula based on SCr of 0.93 mg/dL).  No results found for: \"HGBA1C\"      Infection   Results from last 7 days   Lab Units 07/06/25  1404   PROCALCITONIN ng/mL 0.95*     UA    Results from last 7 days   Lab Units 07/06/25  1811   NITRITE UA  Positive*   WBC UA /HPF 6-10*   BACTERIA UA /HPF None Seen   SQUAM EPITHEL UA /HPF 3-6*     Microbiology Results (last 10 days)       Procedure Component Value - Date/Time    MRSA Screen, PCR (Inpatient) - Swab, Nares [[id number]]  (Normal) Collected: 07/06/25 1930    Lab Status: Final result Specimen: Swab from Nares Updated: 07/06/25 2131     MRSA PCR No MRSA Detected    Narrative:      The negative predictive value of this diagnostic test is high and should only be used to consider de-escalating anti-MRSA therapy. A positive result may indicate colonization with MRSA and must be correlated clinically.    S. Pneumo Ag Urine or CSF - Urine, Urine, Catheter In/Out [[id number]]  (Normal) Collected: 07/06/25 1811    Lab Status: Final result Specimen: Urine, Catheter In/Out Updated: 07/06/25 1849     Strep Pneumo Ag Negative    Legionella Antigen, Urine - Urine, Urine, Catheter In/Out [[id number]]  (Normal) Collected: 07/06/25 1811    Lab Status: Final result Specimen: Urine, Catheter In/Out Updated: 07/06/25 1849     LEGIONELLA ANTIGEN, URINE Negative          Imaging Results (Last 72 Hours)       Procedure Component Value Units Date/Time    CT Head Without Contrast [[id number]] Collected: 07/06/25 " 1830     Updated: 07/06/25 1837    Narrative:      CT HEAD WO CONTRAST    Date of Exam: 7/6/2025 6:23 PM EDT    Indication: acute encephalopathy.    Comparison: None available.    Technique: Axial CT images were obtained of the head without contrast administration.  Coronal reconstructions were performed.  Automated exposure control and iterative reconstruction methods were used.        FINDINGS:    Brain/Ventricles: There is no acute intracranial hemorrhage, mass effect or midline shift. No abnormal extra-axial fluid collection.  The gray-white differentiation is maintained without evidence of an acute infarct.  There is no evidence of   hydrocephalus    Orbits: The visualized portion of the orbits demonstrate no acute abnormality.    Incidental note of the divergent gaze    Sinuses:The visualized paranasal sinuses and mastoid air cells demonstrate no acute abnormality.    Soft Tissues/Skull: No acute abnormality of the visualized skull or soft tissues.      Impression:      No acute intracranial abnormality.      Electronically Signed: Rashaad Joel MD    7/6/2025 6:35 PM EDT    Workstation ID: [id number]    XR Chest 1 View [[id number]] Collected: 07/06/25 1822     Updated: 07/06/25 1825    Narrative:      XR CHEST 1 VW    Date of Exam: 7/6/2025 6:00 PM EDT    Indication: Leukocytosis    Comparison: None available.    Findings:  The right hemidiaphragm is slightly elevated. There are bandlike right perihilar and medial basilar densities representing scarring versus subsegmental atelectasis. The remaining lungs and pleural spaces are clear. The heart size is normal. The pulmonary   vascular markings are normal. There is mild degenerative spondylosis in the thoracic spine.      Impression:      Impression:  1.Slightly elevated right hemidiaphragm.  2.Bandlike right perihilar and medial basilar scarring versus subsegmental atelectasis.        Electronically Signed: Roderick Melton MD    7/6/2025 6:23 PM EDT     Workstation ID: [id number]    US Liver [[id number]] Collected: 07/06/25 1624     Updated: 07/06/25 1627    Narrative:      US LIVER    Date of Exam: 7/6/2025 3:06 PM EDT    Indication: elevated LFT's, check for cirrhosis, ascites, HCC.    Comparison: No comparisons available.    Technique: Grayscale and color Doppler ultrasound evaluation of the liver was performed.      Findings:  Diffuse increased hepatic echogenicity. Fairly homogeneous hepatic echotexture. Liver is enlarged measuring up to 20 cm in length. No solid appearing mass. No visualized ascites. Hepatopetal flow of main portal vein. No intrahepatic duct dilation. Common   bile duct measures up to 4 mm normal for age.      Impression:      Impression:  1. Hepatomegaly with likely mild to moderate severity hepatic steatosis.  2. No sonographic evidence of hepatocellular carcinoma.  3. No visualized ascites. Hepatopetal flow of main portal vein.        Electronically Signed: Trevor Marks MD    7/6/2025 4:25 PM EDT    Workstation ID: [id number]            Assessment & Plan     Patient admitted into the hospital 7/6/2025.  GI was consulted for elevated bilirubin.  History of alcohol abuse with withdrawal.    ASSESSMENT:  Alcohol hepatitis  Elevated LFTs  Alcohol withdrawal  Electrolyte abnormalities  Thrombocytopenia        PLAN:  -Brenda discriminant function 38 survival.  Patient started on prednisolone for alcoholic hepatitis 7/6/2025.  Calculate Lille score on day 7 (7/12/2025) or consider calculating early on day 4 (7/9/2025).  - Acute hepatitis panel negative.  - Hemoglobin 12.1 from 13.2, likely hemodilutional.  No overt GI bleeding noted.  Continue to monitor H&H and transfuse as needed for hemoglobin less than 7.  - LFTs slightly increasing with total bili slightly improving.  AST/ALT ratio consistent with alcohol hepatitis.  Alk phos 174 from 213,  from 121, ALT 56 from 47, total bilirubin 12.6 from 14.5.  - RUQ US-hepatomegaly with likely  mild to moderate severe  -Continue CIWA protocol.  -Continue regular/high-protein diet as tolerated  -Continue thiamine, folic acid and zinc due to alcohol abuse.   -Continue lactulose and Xifaxan.  - Supportive care.    Electronically signed by RYAN Camejo, 07/07/25, 9:33 AM EDT.

## 2025-07-07 NOTE — CONSULTS
"Patient Name: Colt Luciano  YOB: 1990  MRN: [id number]  Admission date: 7/6/2025  Reason for Encounter: Tube Feed Consult    [facility] Clinical Nutrition Assessment     Subjective    Subjective Information     7/7: Pt was admitted with alcohol withdrawal delirium, GI is following. Psychiatry is following. RD was consulted for tube feedings, ordered these this morning. Tube feeding order has since been discontinued and pt is now on a high protein diet. Will order nutrition supplements for this. Pt speaks little english, spoke with RN. NFPE completed and not consistent with nutrition diagnosis of malnutrition at this time using AND/ASPEN criteria.       Assessment    H&P and Current Problems      H&P  No past medical history on file.   No past surgical history on file.   Current Problems   Admission Diagnosis:  Alcohol withdrawal delirium [F10.931]    Problem List:    Alcohol withdrawal delirium      Other Applicable Nutrition Information:   --GI and Psychiatry are following      Anthropometrics      BMI, Height, Weight Estimated body mass index is 29.28 kg/m² as calculated from the following:    Height as of this encounter: 165.1 cm (65\").    Weight as of this encounter: 79.8 kg (175 lb 14.8 oz).    Weight Method: Bed scale       Trending Weight Changes 7/7: 175#   No recent weights are documented        Weight History  Wt Readings from Last 20 Encounters:   07/07/25 0000 79.8 kg (175 lb 14.8 oz)   07/06/25 1141 77.5 kg (170 lb 13.7 oz)   07/06/25 0900 83.1 kg (183 lb 3.2 oz)            Labs      Comment: Reviewed. Management per MD      Results from last 7 days   Lab Units 07/07/25  0522 07/06/25  1936 07/06/25  1404 07/06/25  0620   SODIUM mmol/L 138  --  133* 134*   POTASSIUM mmol/L 3.6 3.0* 3.0* 3.3*   GLUCOSE mg/dL 84  --  85 90   BUN mg/dL 28.3*  --  23.0* 23.6*   CREATININE mg/dL 0.93  --  1.10 1.66*   CALCIUM mg/dL 8.2*  --  8.2* 8.6   PHOSPHORUS mg/dL  --   --  2.5  --    MAGNESIUM " "mg/dL 2.6  --  2.3 2.0   ALBUMIN g/dL 3.1*  --   --  3.4*   LACTATE mmol/L  --  0.9  --   --    BILIRUBIN mg/dL 12.6*  --   --  14.5*   ALK PHOS U/L 174*  --   --  213*   AST (SGOT) U/L 135*  --   --  121*   ALT (SGPT) U/L 56*  --   --  47*     Results from last 7 days   Lab Units 07/07/25  0522 07/06/25  0620   PLATELETS 10*3/mm3 75* 66*   HEMOGLOBIN g/dL 12.1* 13.2   HEMATOCRIT % 35.6* 38.2     No results found for: \"HGBA1C\"       Medications       Scheduled Medications folic acid 1 mg in sodium chloride 0.9 % 50 mL IVPB, 1 mg, Intravenous, Daily  lactulose, 30 g, Oral, TID  multivitamin with minerals, 1 tablet, Oral, Daily  pantoprazole, 40 mg, Intravenous, Q AM  PHENobarbital, 65 mg, Intravenous, Once   Followed by  [START ON 7/8/2025] PHENobarbital, 32.4 mg, Oral, Once   Followed by  [START ON 7/8/2025] PHENobarbital, 32.4 mg, Oral, Once   Followed by  [START ON 7/9/2025] PHENobarbital, 32.4 mg, Oral, Once  prednisoLONE sodium phosphate, 40 mg, Oral, Daily  rifAXIMin, 550 mg, Oral, Q12H  sodium chloride, 10 mL, Intravenous, Q12H  thiamine (B-1) IV, 500 mg, Intravenous, Q8H   Followed by  [START ON 7/9/2025] thiamine (B-1) IV, 200 mg, Intravenous, Q8H   Followed by  [START ON 7/13/2025] thiamine, 100 mg, Oral, Daily  zinc sulfate, 220 mg, Oral, Daily        Infusions        PRN Medications   aluminum-magnesium hydroxide-simethicone    senna-docusate sodium **AND** polyethylene glycol **AND** bisacodyl **AND** bisacodyl    Calcium Replacement - Follow Nurse / BPA Driven Protocol    HYDROmorphone    LORazepam **OR** midazolam **OR** LORazepam **OR** midazolam **OR** midazolam **OR** midazolam    Magnesium Standard Dose Replacement - Follow Nurse / BPA Driven Protocol    Magnesium Standard Dose Replacement - Follow Nurse / BPA Driven Protocol    nitroglycerin    Phosphorus Replacement - Follow Nurse / BPA Driven Protocol    Potassium Replacement - Follow Nurse / BPA Driven Protocol    sodium chloride    sodium " chloride     Physical Findings      Chewing/Swallowing  Teeth Status: Mouth/Teeth WDL: .WDL except, teeth Teeth Symptoms: decay/caries  Chewing/Swallowing Issues: No issues identified at this time   Edema                 Ankle, Left Edema: 1+ (Trace) Ankle, Right Edema: 1+ (Trace)  Foot, Left Edema: 1+ (Trace) Foot, Right Edema: 1+ (Trace)   Bowel Function  Stool Output  Stool (mL): 0 mL (07/07/25 0600)  Stool Unmeasured Occurrence: 1 (07/07/25 0900)  Bowel Incontinence: Yes (07/07/25 0900)  Stool Amount: Large (07/07/25 0900)  Stool Consistency: liquid, loose (07/07/25 0900)  Perineal Care: absorbent brief/pad changed, perineum cleansed (07/07/25 0900)  Last Bowel Movement: 07/07/25   I/Os  Intake & Output (last 3 days)         07/04 0701 07/05 0700 07/05 0701 07/06 0700 07/06 0701 07/07 0700 07/07 0701 07/08 0700    P.O.   375     I.V. (mL/kg)   1537 (19.3)     Total Intake(mL/kg)   1912 (24)     Urine (mL/kg/hr)   750 (0.4)     Emesis/NG output   0     Stool   500     Total Output   1250     Net   +662             Urine Unmeasured Occurrence   4 x     Stool Unmeasured Occurrence   0 x 1 x    Emesis Unmeasured Occurrence   0 x            Lines, Drains, Airways, & Wounds       Active LDAs       Name Placement date Placement time Site Days Last dressing change    Peripheral IV 07/06/25 1150 20 G Anterior;Distal;Right;Upper Arm 07/06/25  1150  Arm  less than 1 07/06/25 1600 (17.45 hrs)    Peripheral IV 07/06/25 1900 20 G Anterior;Distal;Left;Upper Arm 07/06/25  1900  Arm  less than 1 07/06/25 2000 (13.45 hrs)    Peripheral IV 07/06/25 1930 20 G Anterior;Left;Upper Arm 07/06/25  1930  Arm  less than 1 07/06/25 2000 (13.45 hrs)                      Nutrition Focused Physical Exam     Trending NFPE 7/7: NFPE completed and not consistent with nutrition diagnosis of malnutrition at this time using AND/ASPEN criteria.       Malnutrition Severity Assessment              Estimated Needs      Energy Requirements    Weight  for Calculation CBW 80 kg    Method for Estimation  30-35 kcals/kg   Daily Needs (kcal/day) 0400-5586 kcal./day    Protein Requirements    Weight for Calculation 61 kg IBW    Method for Estimation 1.2-2.0 gm/kg   Daily Needs (g/day)  g/day    Fluid Requirements     Method for Estimation 1 mL/kcal    Daily Needs (mL/day)      Current Nutrition Orders & Evaluation of Intake      Oral Nutrition     Food Allergies  and Intolerances NKFA   Current PO Diet Diet: High Protein Diet; Fluid Consistency: Thin (IDDSI 0)   Oral Nutrition Supplement None     Trending % PO Intake 7/7: 0%      Enteral Nutrition     Current EN Order Patient isn't on Tube Feeding  Patient doesn't have any tube feeding modular orders     EN Route      EN Tolerance     EN Observation/Intake         Parenteral Nutrition     Current TPN Order    TPN Route    Lipids (mL/%/frequency)     Total # Days on TPN    TPN Observation/Intake       Assessment & Plan   Nutrition Diagnosis and Goals       Nutrition Diagnosis 1 Increased Nutrient Needs (protein) related to hepatitis as evidenced by need for a high protein diet       Nutrition Diagnosis 2 None         Goal(s) Average Meal Intake at Least 50%     Nutrition Intervention and Prescription       Intervention  Start oral nutrition supplement and Completed NFPE      Diet Prescription High protein     Supplement Prescription Boost Original BID (Provides 480 kcals, 20 g protein if consumed)      Education Provided  N/A      Enteral Prescription TF order now d/c'd        TPN Prescription      Monitoring/Evaluation       Monitor/Evaluation PO Intake and Oral Nutrition Supplement Intake     RD Follow-Up Encounter 3-5 days     Electronically signed by:  Peg Alarcon RD  07/07/25 11:28 EDT

## 2025-07-07 NOTE — CASE MANAGEMENT/SOCIAL WORK
Discharge Planning Assessment   [place]     Patient Name: Colt Luciano  MRN: [id number]  Today's Date: 7/7/2025    Admit Date: 7/6/2025    Plan: Plan to return to apartment with 2 co-worker friends.   Discharge Needs Assessment       Row Name 07/07/25 1756       Living Environment    People in Home other (see comments)    Unique Family Situation 2 friends - Beau and Joseph    Current Living Arrangements apartment    Potentially Unsafe Housing Conditions none    In the past 12 months has the electric, gas, oil, or water company threatened to shut off services in your home? No    Primary Care Provided by self    Provides Primary Care For no one    Family Caregiver if Needed --  2 friends - Beau and Joseph    Quality of Family Relationships helpful;involved;supportive    Able to Return to Prior Arrangements yes    Living Arrangement Comments lives in an apartment with 2 co-worker friends       Resource/Environmental Concerns    Resource/Environmental Concerns none    Transportation Concerns none       Transportation Needs    In the past 12 months, has lack of transportation kept you from medical appointments or from getting medications? no    In the past 12 months, has lack of transportation kept you from meetings, work, or from getting things needed for daily living? No       Food Insecurity    Within the past 12 months, you worried that your food would run out before you got the money to buy more. Never true    Within the past 12 months, the food you bought just didn't last and you didn't have money to get more. Never true       Transition Planning    Patient/Family Anticipates Transition to home    Patient/Family Anticipated Services at Transition none    Transportation Anticipated car, drives self;family or friend will provide       Discharge Needs Assessment    Readmission Within the Last 30 Days no previous admission in last 30 days    Equipment Currently Used at Home none    Concerns to be Addressed  discharge planning    Anticipated Changes Related to Illness none    Equipment Needed After Discharge none                   Discharge Plan       Row Name 07/07/25 1759       Plan    Plan Plan to return to apartment with 2 co-worker friends.    Patient/Family in Agreement with Plan yes    Plan Comments CM met with patient at bedside, Greenlandic speaking RN to interpret. Patient A&Ox4. Patient lives in an apartment with 2 co-worker friends. Friends will transport at discharge. Patient performs ADLs. PCP and pharmacy confirmed. Agreeable to M2[facility].  Denies financial assistance needs for medication and/or food. Denies any current DME, HH, Caregiver, or rehab services. DC BarriersL sitter at bedside, CIWA, Lactulose, Xifaxan, monitor LFTS/labs, Psych/GI following.               Expected Discharge Date and Time       Expected Discharge Date Expected Discharge Time    Jul 9, 2025            Demographic Summary       Row Name 07/07/25 1756       General Information    Admission Type inpatient    Arrived From emergency department    Referral Source admission list    Reason for Consult discharge planning    Preferred Language English                   Functional Status       Row Name 07/07/25 1756       Functional Status    Usual Activity Tolerance good    Current Activity Tolerance fair       Functional Status, IADL    Medications independent    Meal Preparation independent    Housekeeping independent    Laundry independent    Shopping independent       Mental Status    General Appearance WDL WDL       Mental Status Summary    Recent Changes in Mental Status/Cognitive Functioning no changes           KHALIF Webster RN  ICU/CVU   O: [phone]  C: [phone]  [email or web address]

## 2025-07-07 NOTE — SIGNIFICANT NOTE
07/07/25 1415   Living Situation   Current Living Arrangements apartment   Potentially Unsafe Housing Conditions none   Food Insecurity   Within the past 12 months, you worried that your food would run out before you got the money to buy more. Never true   Within the past 12 months, the food you bought just didn't last and you didn't have money to get more. Never true   Transportation Needs   In the past 12 months, has lack of transportation kept you from medical appointments or from getting medications? no   In the past 12 months, has lack of transportation kept you from meetings, work, or from getting things needed for daily living? No   Utilities   In the past 12 months has the electric, gas, oil, or water company threatened to shut off services in your home? No   Abuse Screen (yes response referral indicated)   Feels Unsafe at Home or Work/School no   Feels Threatened by Someone no   Does Anyone Try to Keep You From Having Contact with Others or Doing Things Outside Your Home? no   Physical Signs of Abuse Present no   Financial Resource Strain   How hard is it for you to pay for the very basics like food, housing, medical care, and heating? Not very   Employment   Do you want help finding or keeping work or a job? I do not need or want help   Family and Community Support   If for any reason you need help with day-to-day activities such as bathing, preparing meals, shopping, managing finances, etc., do you get the help you need? I don't need any help   How often do you feel lonely or isolated from those around you? Never   Education   Preferred Language English   Do you want help with school or training? For example, starting or completing job training or getting a high school diploma, GED or equivalent No   Physical Activity   On average, how many days per week do you engage in moderate to strenuous exercise (like a brisk walk)? 0 days   On average, how many minutes do you engage in exercise at this level? 0  min   Number of minutes of exercise per week (!) 0   Alcohol Use   Q1: How often do you have a drink containing alcohol? 4 or more ti   Q2: How many drinks containing alcohol do you have on a typical day when you are drinking? 5 or 6   Q3: How often do you have six or more drinks on one occasion? Daily   Stress   Do you feel stress - tense, restless, nervous, or anxious, or unable to sleep at night because your mind is troubled all the time - these days? Only a littl   Mental Health   Little interest or pleasure in doing things Not at all   Feeling down, depressed, or hopeless Not at all   Disabilities   Difficulty Concentrating, Remembering or Making Decisions no   Difficulty Managing Errands Independently no

## 2025-07-07 NOTE — PAYOR COMM NOTE
"PA FORM WITH CLINICALS FOR INPATIENT PRECERT:                            AUTHORIZATION PENDING:   PLEASE CALL OR FAX DETERMINATION TO CONTACT BELOW. THANK YOU.        Natasha Sandoval RN MSN  /UR  [facility]  [phone] office  [phone] fax  [email or web address]    [facility]  NPI: [id number]  Tax: [id number]            Colt Luciano (35 y.o. Male)       Date of Birth   1990    Social Security Number       Address   [address] [place] [address]    Home Phone   [phone]    MRN   [id number]       Orthodoxy   None    Marital Status   Single                            Admission Date   7/6/2025    Admission Type   Urgent    Admitting Provider   Bryan De La Garza DO    Attending Provider   Jason Carpio MD    Department, Room/Bed   [facility] INTENSIVE CARE UNIT, 2308/1       Discharge Date       Discharge Disposition       Discharge Destination                                 Attending Provider: Jason Carpio MD    Allergies: No Known Allergies    Isolation: None   Infection: None   Code Status: CPR    Ht: 165.1 cm (65\")   Wt: 79.8 kg (175 lb 14.8 oz)    Admission Cmt: None   Principal Problem: Alcohol withdrawal delirium [F10.931]                   Active Insurance as of 7/6/2025       Primary Coverage       Payor Plan Insurance Group Employer/Plan Group    [place]        Payor Plan Address Payor Plan Phone Number Payor Plan Fax Number Effective Dates    [address] [address]   1/1/2025 - None Entered    [place] [address]         Subscriber Name Subscriber Birth Date Member ID       COLT LUCIANO 1990 [id number]                     Emergency Contacts        (Rel.) Home Phone Work Phone Mobile Phone    SANDRO MOLINA (Friend) -- -- [phone]            07/06/25 1524  Inpatient Admission  (Admission Orders)  Once     Completed     Level of Care: Critical Care  Diagnosis: " Alcohol withdrawal delirium [291.0.ICD-9-CM]  Admitting Physician: PJ WALTON [[id number]]  Attending Physician: PJ WALTON [[id number]]  Certification: I Certify That Inpatient Hospital Services Are Medically Necessary For Greater Than 2 Midnights           History & Physical        Lucila Westbrook APRN at 25 [phone]            Critical Care History and Physical     Colt Luciano : 1990 MRN:[id number] LOS:0 ROOM: [facility]     Reason for admission: Alcohol withdrawal delirium     Assessment / Plan     Acute toxic / metabolic encephalopathy  Likely secondary to acute alcohol withdrawal syndrome  EtOH level <10  Urine tox screen pending  Ammonia level pending  CT head pending  Phenobarbital per [facility] protocol contributing to drowsiness currently  Supportive care for now.     Alcohol abuse syndrome  Acute alcohol withdrawal syndrome  Monitor with CIWA protocol  Started phenobarbital taper per EtOH withdrawal protocol    Acute liver injury on chronic, alcohol induced liver disease  Liver ultrasound reviewed: Hepatomegaly with likely mild to moderate severity hepatic steatosis, no visualized ascites, hepatopetal flow with main portal vein  INR 1.36  Acute hepatitis panel pending  GI consulted  Continue rifaximin    Leukocytosis  Blood cultures pending  Urine antigens for Legionella and strep pneumo pending  Urinalysis with reflex culture pending  Chest x-ray pending  Procalcitonin  Liver ultrasound did not reveal ascites, not likely SBP      Code Status (Patient has no pulse and is not breathing): CPR (Attempt to Resuscitate)  Medical Interventions (Patient has pulse or is breathing): Full Support  Level Of Support Discussed With: Patient       Nutrition:   NPO Diet NPO Type: Strict NPO     VTE Prophylaxis:  Mechanical VTE prophylaxis orders are present.         History of Present illness     Colt Luciano is a 35 y.o. male with PMH of alcohol abuse disorder presented to the hospital of an  [facility], and was admitted with a principal diagnosis of Alcohol withdrawal delirium.  The following information has been obtained primarily from review of documentation and collaboration with the hospitalist as the patient is confused at the time of admission to the ICU.  The patient reportedly presented to the ED of an [facility] overnight for evaluation of increasing tremors, anxiousness, and abdominal pain.  The patient reported that he usually drinks 8-12 beers per day however quit drinking about 4 days ago.  Friends and his boss noted that he was having increased shaking and brought him to the ED.  Findings in the ED ED were consistent with acute kidney injury and acute liver injury.  CT of the abdomen revealed hepatomegaly with steatosis and nodular changes consistent with evolving cirrhosis.  Transfer to [facility] was requested for higher level medical management as well as subspecialty availability.  The patient was initially excepted by the hospitalist for admission to the floor however the patient quickly developed fulminant alcohol withdrawal requiring large doses of Valium and physical restraints for his safety.  The patient was then transferred to the ICU for aggressive medical management and Precedex infusion to facilitate patient care.  The patient is exclusively Jordanian-speaking and provided some information via electronic       Of note, this patient is marked for a merged chart.  The alternate name is Colt Montano medical record #[id number]    ACP: No ACP documentation on file    Patient was seen and examined on 07/06/25 at 16:19 EDT .      Past Medical/Surgical/Social/Family History & Allergies     No past medical history on file.   No past surgical history on file.   Social History     Socioeconomic History    Marital status: Single      No family history on file.   No Known Allergies   Social Drivers of Health     Tobacco Use: Not on file   Alcohol Use: Not  on file   Financial Resource Strain: Not on file   Food Insecurity: Not on file   Transportation Needs: Not on file   Physical Activity: Not on file   Stress: Not on file   Social Connections: Not on file   Interpersonal Safety: Not on file   Depression: Not on file   Housing Stability: Not on file   Utilities: Not on file   Health Literacy: Not on file   Employment: Not on file   Disabilities: Not on file        Home Medications     Prior to Admission medications    Not on File        Objective / Physical Exam     Vital signs:  Temp: 97.6 °F (36.4 °C)  BP: 103/61  Heart Rate: 94  Resp: 23  SpO2: 99 %  Weight: 77.5 kg (170 lb 13.7 oz)    Admission Weight: Weight: 83.1 kg (183 lb 3.2 oz)    Physical Exam  Vitals and nursing note reviewed.   Constitutional:       General: He is not in acute distress.     Appearance: He is ill-appearing.      Comments: Agitated at times   HENT:      Head: Normocephalic and atraumatic.      Right Ear: External ear normal.      Left Ear: External ear normal.      Nose: Nose normal.   Eyes:      General: Scleral icterus present.         Right eye: No discharge.         Left eye: No discharge.      Pupils: Pupils are equal, round, and reactive to light.   Neck:      Comments: Trachea midline  No JVD  Cardiovascular:      Heart sounds: Normal heart sounds, S1 normal and S2 normal. No murmur heard.     Comments: Sinus rhythm  Pulmonary:      Effort: Pulmonary effort is normal. No respiratory distress.      Breath sounds: Normal breath sounds. No wheezing or rhonchi.   Abdominal:      General: There is no distension.      Palpations: Abdomen is soft.      Comments: Bowel sounds hyperactive   Musculoskeletal:      Cervical back: Neck supple.      Right lower leg: No edema.      Left lower leg: No edema.   Skin:     General: Skin is warm and dry.      Coloration: Skin is jaundiced.      Comments: Scattered areas of ecchymosis on the extremities   Neurological:      Comments: Interview per  electronic   Confusion, no insight into current situation  No lateralizing deficits   Psychiatric:      Comments: Seems anxious and agitated at times          Labs     Results from last 7 days   Lab Units 07/06/25  0620   WBC 10*3/mm3 12.54*   HEMOGLOBIN g/dL 13.2   HEMATOCRIT % 38.2   PLATELETS 10*3/mm3 66*      Results from last 7 days   Lab Units 07/06/25  1404 07/06/25  0620   SODIUM mmol/L 133* 134*   POTASSIUM mmol/L 3.0* 3.3*   CHLORIDE mmol/L 99 97*   CO2 mmol/L 19.7* 18.5*   ANION GAP mmol/L 14.3 18.5*   BUN mg/dL 23.0* 23.6*   CREATININE mg/dL 1.10 1.66*   GLUCOSE mg/dL 85 90   PHOSPHORUS mg/dL 2.5  --    MAGNESIUM mg/dL 2.3 2.0   ALT (SGPT) U/L  --  47*   AST (SGOT) U/L  --  121*   ALK PHOS U/L  --  213*            Imaging     Chest X ray: Chest x-ray not indicated thus far this admission    EKG: pending    Current Medications     Scheduled Meds:  folic acid 1 mg in sodium chloride 0.9 % 50 mL IVPB, 1 mg, Intravenous, Daily  lactulose, 30 g, Oral, TID  multivitamin with minerals, 1 tablet, Oral, Daily  PHENobarbital, 3 mg/kg, Intravenous, Once  [START ON 7/7/2025] PHENobarbital, 65 mg, Intravenous, Once   Followed by  [START ON 7/7/2025] PHENobarbital, 65 mg, Intravenous, Once   Followed by  [START ON 7/8/2025] PHENobarbital, 32.4 mg, Oral, Once   Followed by  [START ON 7/8/2025] PHENobarbital, 32.4 mg, Oral, Once   Followed by  [START ON 7/9/2025] PHENobarbital, 32.4 mg, Oral, Once  potassium chloride, 10 mEq, Intravenous, Q1H  prednisoLONE sodium phosphate, 40 mg, Oral, Daily  sodium chloride, 10 mL, Intravenous, Q12H  thiamine (B-1) IV, 500 mg, Intravenous, Q8H   Followed by  [START ON 7/9/2025] thiamine (B-1) IV, 200 mg, Intravenous, Q8H   Followed by  [START ON 7/13/2025] thiamine, 100 mg, Oral, Daily  zinc sulfate, 220 mg, Oral, Daily         Continuous Infusions:          RYAN Solomon   Critical Care  07/06/25   16:19 EDT     Electronically signed by Lucila Westbrook APRN at 07/06/25  1749       Emergency Department Notes    No notes of this type exist for this encounter.       Operative/Procedure Notes (all)    No notes of this type exist for this encounter.          Physician Progress Notes (all)        Lucila Westbrook APRN at 25 1241       Attestation signed by Tung Fernandez MD at 25 [phone]      I have reviewed this documentation and agree.                        Critical Care Progress Note     Colt Luciano : 1990 MRN:[id number] LOS:1  Rm: 2308/1     Principal Problem: Alcohol withdrawal delirium     Reason for follow up: All the medical problems listed below    Summary     Colt Luciano is a 35 y.o. male with PMH of alcohol abuse disorder [facility] and was admitted with a principal diagnosis of Alcohol withdrawal delirium.  The following information has been obtained primarily from review of documentation and collaboration with the hospitalist as the patient is confused at the time of admission to the ICU.  The patient reportedly presented to the ED of an [facility] facility overnight for evaluation of increasing tremors, anxiousness, and abdominal pain.  The patient reported that he usually drinks 8-12 beers per day however quit drinking about 4 days ago.  Friends and his boss noted that he was having increased shaking and brought him to the ED.  Findings in the ED ED were consistent with acute kidney injury and acute liver injury.  CT of the abdomen revealed hepatomegaly with steatosis and nodular changes consistent with evolving cirrhosis.  Transfer to [facility] was requested for higher level medical management as well as subspecialty availability.  The patient was initially excepted by the hospitalist for admission to the floor however the patient quickly developed fulminant alcohol withdrawal requiring large doses of Valium and physical restraints for his safety.  The patient was then transferred to the ICU for  aggressive medical management and Precedex infusion to facilitate patient care.  The patient's is primarily Tristanian-speaking and provided some information via electronic         Of note, this patient is marked for a merged chart.  The alternate name is Colt Montano medical record #[id number]       Patient is on Hospital Day: 2.    Significant Events / Subjective     07/07/25 : The patient is awake and communicative today, able to communicate in English for examination. He remains confused and impulsive however redirects easily. Sitter at bedside for patient safety. Started a diet which is tolerated well.  Hold further antibiotics for now, infectious workup benign at this point.  He is stable for transfer out of the ICU    Assessment / Plan     Acute toxic / metabolic encephalopathy  Likely secondary to acute alcohol withdrawal syndrome  EtOH level <10  Urine tox screen positive for barbiturates and benzodiazapine which are consistent with medications administered since admission. Remaining drugs of abuse are negative   Ammonia level 37  CT head negative for acute intracranial findings  Phenobarbital per [facility] protocol   Supportive care for now.      Alcohol abuse syndrome  Acute alcohol withdrawal syndrome  Monitor with CIWA protocol  Started phenobarbital taper per EtOH withdrawal protocol     Acute liver injury on chronic, alcohol induced liver disease  Liver ultrasound reviewed: Hepatomegaly with likely mild to moderate severity hepatic steatosis, no visualized ascites, hepatopetal flow with main portal vein  INR 1.36  Acute hepatitis panel nonreactive  GI following  Continue rifaximin & lactulose     Leukocytosis  Blood cultures pending and NTD  Urine antigens for Legionella and strep pneumo negative  Urinalysis with reflex culture pending  Chest x-ray no acute findings  Procalcitonin 0.95  Liver ultrasound did not reveal ascites, not likely SBP       Disposition:  PCU    Code status:   Code Status  (Patient has no pulse and is not breathing): CPR (Attempt to Resuscitate)  Medical Interventions (Patient has pulse or is breathing): Full Support  Level Of Support Discussed With: Patient       Nutrition:   Diet: High Protein Diet; Fluid Consistency: Thin (IDDSI 0)   Patient isn't on Tube Feeding     VTE Prophylaxis:  Mechanical VTE prophylaxis orders are present.           Objective / Physical Exam     Vital signs:  Temp: 98.1 °F (36.7 °C)  BP: 95/53  Heart Rate: 85  Resp: 14  SpO2: 100 %  Weight: 79.8 kg (175 lb 14.8 oz)    Admission Weight: Weight: 83.1 kg (183 lb 3.2 oz)  Current Weight: Weight: 79.8 kg (175 lb 14.8 oz)    Input/Output in last 24 hours:    Intake/Output Summary (Last 24 hours) at 7/7/2025 1241  Last data filed at 7/7/2025 0600  Gross per 24 hour   Intake 1912 ml   Output 1250 ml   Net 662 ml      Net IO Since Admission: 662 mL [07/07/25 1241]     Physical Exam  Vitals and nursing note reviewed.   Constitutional:       General: He is not in acute distress.     Appearance: He is ill-appearing.      Comments: Tremors    HENT:      Head: Normocephalic and atraumatic.      Right Ear: External ear normal.      Left Ear: External ear normal.   Eyes:      General: Scleral icterus present.      Conjunctiva/sclera: Conjunctivae normal.   Neck:      Comments: Trachea midline  No JVD  Cardiovascular:      Heart sounds: Normal heart sounds, S1 normal and S2 normal. No murmur heard.     Comments: SR  Pulmonary:      Breath sounds: Normal breath sounds. No wheezing or rhonchi.   Abdominal:      General: There is distension.      Tenderness: There is no abdominal tenderness.   Musculoskeletal:      Right lower leg: No edema.      Left lower leg: No edema.   Skin:     General: Skin is warm and dry.      Coloration: Skin is jaundiced.   Neurological:      Mental Status: He is alert.      Comments: Confused and impulsive   Agitated at times  Tremors           Radiology and Labs     Results from last 7 days   Lab  Units 07/07/25  0522 07/06/25  0620   WBC 10*3/mm3 9.46 12.54*   HEMOGLOBIN g/dL 12.1* 13.2   HEMATOCRIT % 35.6* 38.2   PLATELETS 10*3/mm3 75* 66*      Results from last 7 days   Lab Units 07/07/25  0522 07/06/25  0620   PROTIME Seconds 15.7* 16.8*   INR  1.25* 1.36*      Results from last 7 days   Lab Units 07/07/25  0522 07/06/25  1936 07/06/25  1404 07/06/25  0620   SODIUM mmol/L 138  --  133* 134*   POTASSIUM mmol/L 3.6 3.0* 3.0* 3.3*   CHLORIDE mmol/L 107  --  99 97*   CO2 mmol/L 18.3*  --  19.7* 18.5*   ANION GAP mmol/L 12.7  --  14.3 18.5*   BUN mg/dL 28.3*  --  23.0* 23.6*   CREATININE mg/dL 0.93  --  1.10 1.66*   GLUCOSE mg/dL 84  --  85 90   PHOSPHORUS mg/dL  --   --  2.5  --    MAGNESIUM mg/dL 2.6  --  2.3 2.0   ALT (SGPT) U/L 56*  --   --  47*   AST (SGOT) U/L 135*  --   --  121*   ALK PHOS U/L 174*  --   --  213*      Results from last 7 days   Lab Units 07/07/25 0522 07/06/25 0620   ALT (SGPT) U/L 56* 47*   AST (SGOT) U/L 135* 121*   ALK PHOS U/L 174* 213*           CT Head Without Contrast  Result Date: 7/6/2025  No acute intracranial abnormality. Electronically Signed: Rashaad Joel MD  7/6/2025 6:35 PM EDT  Workstation ID: [id number]    XR Chest 1 View  Result Date: 7/6/2025  Impression: 1.Slightly elevated right hemidiaphragm. 2.Bandlike right perihilar and medial basilar scarring versus subsegmental atelectasis. Electronically Signed: Roderick Melton MD  7/6/2025 6:23 PM EDT  Workstation ID: [id number]    US Liver  Result Date: 7/6/2025  Impression: 1. Hepatomegaly with likely mild to moderate severity hepatic steatosis. 2. No sonographic evidence of hepatocellular carcinoma. 3. No visualized ascites. Hepatopetal flow of main portal vein. Electronically Signed: Trevor Marks MD  7/6/2025 4:25 PM EDT  Workstation ID: [id number]      Current medications     Scheduled Meds:   folic acid 1 mg in sodium chloride 0.9 % 50 mL IVPB, 1 mg, Intravenous, Daily  lactulose, 30 g, Oral, TID  multivitamin with  minerals, 1 tablet, Oral, Daily  pantoprazole, 40 mg, Intravenous, Q AM  PHENobarbital, 65 mg, Intravenous, Once   Followed by  [START ON 7/8/2025] PHENobarbital, 32.4 mg, Oral, Once   Followed by  [START ON 7/8/2025] PHENobarbital, 32.4 mg, Oral, Once   Followed by  [START ON 7/9/2025] PHENobarbital, 32.4 mg, Oral, Once  prednisoLONE sodium phosphate, 40 mg, Oral, Daily  rifAXIMin, 550 mg, Oral, Q12H  sodium chloride, 10 mL, Intravenous, Q12H  thiamine (B-1) IV, 500 mg, Intravenous, Q8H   Followed by  [START ON 7/9/2025] thiamine (B-1) IV, 200 mg, Intravenous, Q8H   Followed by  [START ON 7/13/2025] thiamine, 100 mg, Oral, Daily  zinc sulfate, 220 mg, Oral, Daily        Continuous Infusions:          Plan discussed with RN. Reviewed all other data in the last 24 hours, including but not limited to vitals, labs, microbiology, imaging and pertinent notes from other providers.      RYAN Solomon   Critical Care  07/07/25   12:41 EDT     Electronically signed by Tung Fernandez MD at 07/07/25 1344       Adrienne Thomas APRN at 07/07/25 0933           LOS: 1 day   Patient Care Team:  Provider, No Known as PCP - General      Subjective     Interval History:     Subjective: Patient denies abdominal pain.  Patient continues to have sitter at bedside due to altered mental status.  No nausea or vomiting.  Patient more awake today.      ROS:   No chest pain, shortness of breath, or cough.        Medication Review:     Current Facility-Administered Medications:     aluminum-magnesium hydroxide-simethicone (MAALOX MAX) 400-400-40 MG/5ML suspension 15 mL, 15 mL, Oral, Q6H PRN, Ann Kearns APRN    sennosides-docusate (PERICOLACE) 8.6-50 MG per tablet 2 tablet, 2 tablet, Oral, BID PRN **AND** polyethylene glycol (MIRALAX) packet 17 g, 17 g, Oral, Daily PRN **AND** bisacodyl (DULCOLAX) EC tablet 5 mg, 5 mg, Oral, Daily PRN **AND** bisacodyl (DULCOLAX) suppository 10 mg, 10 mg, Rectal, Daily PRN, Ann Kearns,  APRN    Calcium Replacement - Follow Nurse / BPA Driven Protocol, , Not Applicable, PRN, Bryan De La Garza DO    cefTRIAXone (ROCEPHIN) 2,000 mg in sodium chloride 0.9 % 100 mL MBP, 2,000 mg, Intravenous, Q24H, Day, Lucila G, APRN, Last Rate: 200 mL/hr at 07/06/25 2000, 2,000 mg at 07/06/25 2000    dexmedetomidine (PRECEDEX) 400 mcg in 100 mL NS infusion, 0.2-1.5 mcg/kg/hr, Intravenous, Titrated, Rene Angelique, APRN    folic acid 1 mg in sodium chloride 0.9 % 50 mL IVPB, 1 mg, Intravenous, Daily, Day, Lucila G, APRN, Last Rate: 100 mL/hr at 07/06/25 1305, 1 mg at 07/06/25 1305    HYDROmorphone (DILAUDID) injection 0.5 mg, 0.5 mg, Intravenous, Q2H PRN, Angelique López, APRN, 0.5 mg at 07/07/25 0255    lactulose (CHRONULAC) 10 GM/15ML solution 30 g, 30 g, Oral, TID, AlvaradoCaitlin Rand, APRN, 30 g at 07/06/25 2015    LORazepam (ATIVAN) tablet 1 mg, 1 mg, Oral, Q1H PRN **OR** midazolam (VERSED) injection 2 mg, 2 mg, Intravenous, Q1H PRN **OR** LORazepam (ATIVAN) tablet 2 mg, 2 mg, Oral, Q1H PRN, 2 mg at 07/06/25 2015 **OR** midazolam (VERSED) injection 4 mg, 4 mg, Intravenous, Q1H PRN, 4 mg at 07/07/25 0215 **OR** midazolam (VERSED) injection 4 mg, 4 mg, Intravenous, Q15 Min PRN, 4 mg at 07/06/25 1229 **OR** midazolam (VERSED) injection 4 mg, 4 mg, Intramuscular, Q15 Min PRN, Day, Lucila G, APRN    Magnesium Standard Dose Replacement - Follow Nurse / BPA Driven Protocol, , Not Applicable, PRN, Axel Kearnsle, APRN    Magnesium Standard Dose Replacement - Follow Nurse / BPA Driven Protocol, , Not Applicable, PRN, [place], Christopher L, DO    multivitamin with minerals 1 tablet, 1 tablet, Oral, Daily, Day, RYAN Jovel, 1 tablet at 07/06/25 2002    nitroglycerin (NITROSTAT) SL tablet 0.4 mg, 0.4 mg, Sublingual, Q5 Min PRN, Bryan De La Garza,     pantoprazole (PROTONIX) injection 40 mg, 40 mg, Intravenous, Q AM, Day, YRAN Jovel, 40 mg at 07/07/25 0505    [COMPLETED] PHENobarbital injection 65 mg, 65 mg,  Intravenous, Once, 65 mg at 07/07/25 0504 **FOLLOWED BY** PHENobarbital injection 65 mg, 65 mg, Intravenous, Once **FOLLOWED BY** [START ON 7/8/2025] PHENobarbital tablet 32.4 mg, 32.4 mg, Oral, Once **FOLLOWED BY** [START ON 7/8/2025] PHENobarbital tablet 32.4 mg, 32.4 mg, Oral, Once **FOLLOWED BY** [START ON 7/9/2025] PHENobarbital tablet 32.4 mg, 32.4 mg, Oral, Once, Day, Lucila G, APRN    Phosphorus Replacement - Follow Nurse / BPA Driven Protocol, , Not Applicable, PRN, Bryan De La Garza,     potassium chloride (KLOR-CON M20) CR tablet 40 mEq, 40 mEq, Oral, Q4H, Bryan De La Garza DO, 40 mEq at 07/07/25 0400    Potassium Replacement - Follow Nurse / BPA Driven Protocol, , Not Applicable, PRN, Bryan De La Garza DO    prednisoLONE sodium phosphate (ORAPRED) 15 MG/5ML oral solution 40 mg, 40 mg, Oral, Daily, Caitlin Alvarado, APRN, 40 mg at 07/06/25 1205    riFAXIMin (XIFAXAN) tablet 550 mg, 550 mg, Oral, Q12H, Caitlin Alvarado APRN, 550 mg at 07/06/25 2002    sodium chloride 0.9 % flush 10 mL, 10 mL, Intravenous, Q12H, Axel Kearnsle, APRN, 10 mL at 07/06/25 2000    sodium chloride 0.9 % flush 10 mL, 10 mL, Intravenous, PRN, AchterBernie villatorohelle, APRN, 10 mL at 07/06/25 0808    sodium chloride 0.9 % infusion 40 mL, 40 mL, Intravenous, PRN, Axel Kearnsle, APRN    thiamine (B-1) 500 mg in sodium chloride 0.9 % 100 mL IVPB, 500 mg, Intravenous, Q8H, Last Rate: 200 mL/hr at 07/07/25 0505, 500 mg at 07/07/25 0505 **FOLLOWED BY** [START ON 7/9/2025] thiamine (B-1) injection 200 mg, 200 mg, Intravenous, Q8H **FOLLOWED BY** [START ON 7/13/2025] thiamine (VITAMIN B-1) tablet 100 mg, 100 mg, Oral, Daily, Day, RYAN Jovel    zinc sulfate (ZINCATE) capsule 220 mg, 220 mg, Oral, Daily, Caitlin Alvarado APRN, 220 mg at 07/06/25 1203      Objective     Vital Signs  Vitals:    07/07/25 0700 07/07/25 0715 07/07/25 0730 07/07/25 0800   BP: 97/56 101/52 97/53 95/53   BP Location:       Patient Position:     "   Pulse: 86 85 83 85   Resp:       Temp:    97.8 °F (36.6 °C)   TempSrc:    Oral   SpO2: 100% 100% 100% 100%   Weight:       Height:           Physical Exam:     General Appearance:  Sleepy but able to arouse with verbal stimuli, in no acute distress   Head:    Normocephalic, without obvious abnormality   Eyes:          Conjunctivae normal, anicteric sclera       Neck:   supple, no JVD   Lungs:     respirations regular, even and unlabored, 3 L nasal cannula   Abdomen:     Soft, non-tender, no rebound or guarding, non-distended       Extremities:   No edema, no cyanosis   Skin:   No bruising or rash, + jaundice        Results Review:    Results from last 7 days   Lab Units 07/07/25  0522 07/06/25  0620   WBC 10*3/mm3 9.46 12.54*   HEMOGLOBIN g/dL 12.1* 13.2   PLATELETS 10*3/mm3 75* 66*     Results from last 7 days   Lab Units 07/07/25  0522 07/06/25  1936 07/06/25  1404 07/06/25  0620   SODIUM mmol/L 138  --  133* 134*   POTASSIUM mmol/L 3.6 3.0* 3.0* 3.3*   CHLORIDE mmol/L 107  --  99 97*   CO2 mmol/L 18.3*  --  19.7* 18.5*   BUN mg/dL 28.3*  --  23.0* 23.6*   CREATININE mg/dL 0.93  --  1.10 1.66*   GLUCOSE mg/dL 84  --  85 90   ALBUMIN g/dL 3.1*  --   --  3.4*   BILIRUBIN mg/dL 12.6*  --   --  14.5*   ALK PHOS U/L 174*  --   --  213*   AST (SGOT) U/L 135*  --   --  121*   ALT (SGPT) U/L 56*  --   --  47*   INR  1.25*  --   --  1.36*   MAGNESIUM mg/dL 2.6  --  2.3 2.0   PHOSPHORUS mg/dL  --   --  2.5  --    LIPASE U/L  --   --   --  39   AMMONIA umol/L  --  37  --   --      Estimated Creatinine Clearance: 109.9 mL/min (by C-G formula based on SCr of 0.93 mg/dL).  No results found for: \"HGBA1C\"      Infection   Results from last 7 days   Lab Units 07/06/25  1404   PROCALCITONIN ng/mL 0.95*     UA    Results from last 7 days   Lab Units 07/06/25  1811   NITRITE UA  Positive*   WBC UA /HPF 6-10*   BACTERIA UA /HPF None Seen   SQUAM EPITHEL UA /HPF 3-6*     Microbiology Results (last 10 days)       Procedure Component " Value - Date/Time    MRSA Screen, PCR (Inpatient) - Swab, Nares [[id number]]  (Normal) Collected: 07/06/25 1930    Lab Status: Final result Specimen: Swab from Nares Updated: 07/06/25 2131     MRSA PCR No MRSA Detected    Narrative:      The negative predictive value of this diagnostic test is high and should only be used to consider de-escalating anti-MRSA therapy. A positive result may indicate colonization with MRSA and must be correlated clinically.    S. Pneumo Ag Urine or CSF - Urine, Urine, Catheter In/Out [[id number]]  (Normal) Collected: 07/06/25 1811    Lab Status: Final result Specimen: Urine, Catheter In/Out Updated: 07/06/25 1849     Strep Pneumo Ag Negative    Legionella Antigen, Urine - Urine, Urine, Catheter In/Out [[id number]]  (Normal) Collected: 07/06/25 1811    Lab Status: Final result Specimen: Urine, Catheter In/Out Updated: 07/06/25 1849     LEGIONELLA ANTIGEN, URINE Negative          Imaging Results (Last 72 Hours)       Procedure Component Value Units Date/Time    CT Head Without Contrast [[id number]] Collected: 07/06/25 1830     Updated: 07/06/25 1837    Narrative:      CT HEAD WO CONTRAST    Date of Exam: 7/6/2025 6:23 PM EDT    Indication: acute encephalopathy.    Comparison: None available.    Technique: Axial CT images were obtained of the head without contrast administration.  Coronal reconstructions were performed.  Automated exposure control and iterative reconstruction methods were used.        FINDINGS:    Brain/Ventricles: There is no acute intracranial hemorrhage, mass effect or midline shift. No abnormal extra-axial fluid collection.  The gray-white differentiation is maintained without evidence of an acute infarct.  There is no evidence of   hydrocephalus    Orbits: The visualized portion of the orbits demonstrate no acute abnormality.    Incidental note of the divergent gaze    Sinuses:The visualized paranasal sinuses and mastoid air cells demonstrate no acute abnormality.    Soft  Tissues/Skull: No acute abnormality of the visualized skull or soft tissues.      Impression:      No acute intracranial abnormality.      Electronically Signed: Rashaad Joel MD    7/6/2025 6:35 PM EDT    Workstation ID: [id number]    XR Chest 1 View [[id number]] Collected: 07/06/25 1822     Updated: 07/06/25 1825    Narrative:      XR CHEST 1 VW    Date of Exam: 7/6/2025 6:00 PM EDT    Indication: Leukocytosis    Comparison: None available.    Findings:  The right hemidiaphragm is slightly elevated. There are bandlike right perihilar and medial basilar densities representing scarring versus subsegmental atelectasis. The remaining lungs and pleural spaces are clear. The heart size is normal. The pulmonary   vascular markings are normal. There is mild degenerative spondylosis in the thoracic spine.      Impression:      Impression:  1.Slightly elevated right hemidiaphragm.  2.Bandlike right perihilar and medial basilar scarring versus subsegmental atelectasis.        Electronically Signed: Roderick Melton MD    7/6/2025 6:23 PM EDT    Workstation ID: [id number]    US Liver [[id number]] Collected: 07/06/25 1624     Updated: 07/06/25 1627    Narrative:      US LIVER    Date of Exam: 7/6/2025 3:06 PM EDT    Indication: elevated LFT's, check for cirrhosis, ascites, HCC.    Comparison: No comparisons available.    Technique: Grayscale and color Doppler ultrasound evaluation of the liver was performed.      Findings:  Diffuse increased hepatic echogenicity. Fairly homogeneous hepatic echotexture. Liver is enlarged measuring up to 20 cm in length. No solid appearing mass. No visualized ascites. Hepatopetal flow of main portal vein. No intrahepatic duct dilation. Common   bile duct measures up to 4 mm normal for age.      Impression:      Impression:  1. Hepatomegaly with likely mild to moderate severity hepatic steatosis.  2. No sonographic evidence of hepatocellular carcinoma.  3. No visualized ascites. Hepatopetal flow of main  portal vein.        Electronically Signed: Trevor Marks MD    7/6/2025 4:25 PM EDT    Workstation ID: [id number]            Assessment & Plan     Patient admitted into the hospital 7/6/2025.  GI was consulted for elevated bilirubin.  History of alcohol abuse with withdrawal.    ASSESSMENT:  Alcohol hepatitis  Elevated LFTs  Alcohol withdrawal  Electrolyte abnormalities  Thrombocytopenia        PLAN:  -Aurelia discriminant function 38 survival.  Patient started on prednisolone for alcoholic hepatitis 7/6/2025.  Calculate Lille score on day 7 (7/12/2025) or consider calculating early on day 4 (7/9/2025).  - Acute hepatitis panel negative.  - Hemoglobin 12.1 from 13.2, likely hemodilutional.  No overt GI bleeding noted.  Continue to monitor H&H and transfuse as needed for hemoglobin less than 7.  - LFTs slightly increasing with total bili slightly improving.  AST/ALT ratio consistent with alcohol hepatitis.  Alk phos 174 from 213,  from 121, ALT 56 from 47, total bilirubin 12.6 from 14.5.  - RUQ US-hepatomegaly with likely mild to moderate severe  -Continue CIWA protocol.  -Continue regular/high-protein diet as tolerated  -Continue thiamine, folic acid and zinc due to alcohol abuse.   -Continue lactulose and Xifaxan.  - Supportive care.    Electronically signed by RYAN Camejo, 07/07/25, 9:33 AM EDT.           Electronically signed by Adrienne Thomas APRN at 07/07/25 0942          Consult Notes (all)        Shaina Quiñones DNP, RYAN at 07/06/25 1337        Consult Orders    1. Inpatient Psychiatrist Consult [[id number]] ordered by Ann Kearns APRN at 07/06/25 0554                   Referring Provider:     Ann Kearns APRN      Reason for Consultation: alcohol withdrawal     Chief complaint Pt was unable to voice pertinent complaint secondary to confusion    Subjective .     History of present illness:  The patient is a 35 y.o. male who was admitted secondary to history of alcohol use  and acute alcohol withdrawal     Psychiatry consulted secondary to alcohol withdrawal.   Pt is pleasantly confused- Oriented to self, believes he is in [facility].   not agitated -Not aggressive- no threats.  Reports daily alcohol use -drinking 10-15 beers per day.The pt is a limited historian secondary to confusion   RN reported CIWA with phenobarbital and BZD - reported as effective for managing sxs of intermittent agitation and confusion. No threatening behaviors- pt no longer requiring restraints for agitation      The following portions of the patient's history were reviewed and updated as appropriate: allergies, current medications, past family history, past medical history, past social history, past surgical history and problem list.    History    Objective     Vital Signs   /74 (BP Location: Right arm, Patient Position: Lying)   Pulse 116   Temp 97.3 °F (36.3 °C) (Oral)   Resp 23   Wt 77.5 kg (170 lb 13.7 oz)   SpO2 96%       Mental Status Exam:   Hygiene:   fair  Cooperation:  attempting to be cooperative   Eye Contact:  Fair  Psychomotor Behavior:  Slow  Affect:  Appropriate  Mood: pleasant   Speech:  Minimal  Thought Process:  Unable to demonstrate  Thought Content:  Unable to demonstrate  Suicidal:  None  Homicidal:  None  Hallucinations:  Not demonstrated today  Delusion:  Unable to demonstrate  Memory:  Deficits  Orientation:  Person, vaguely to state   Reliability:  poor  Insight:  Poor  Judgement:  Impaired  Impulse Control:  Fair            Assessment & Plan       * No active hospital problems. *       Assessment: Alcohol dependence, and withdrawal   Treatment Plan: The pt presents with alcohol dependence and withdrawal.   Continue CIWA with phenobarbital per hospitalist. Will evaluate cessation and appropriate treatment options when pt is able to participate in discussion -SW following    Will follow   Treatment Plan discussed with: Patient      I discussed the patients findings and  my recommendations with patient and nursing staff    I have reviewed and approved the behavioral health treatment plans and problem list. Yes  Thank you for the consult   Referring MD has access to consult report and progress notes in EMR     Shaina Quiñones DNP, APRN  07/06/25  13:37 EDT             Electronically signed by Shaina Quiñones DNP, APRN at 07/06/25 1353       Christiano CaitlinRYAN Albarran at 07/06/25 1112       Attestation signed by OPAL Aguilar MD at 07/06/25 1710      Electronically signed on 07/06/25 17:09 EDT by KARIE Aguilar MD  The patient was seen and examined with an APRN. I personally performed the entire medical decision making, and physical exam. Labs and imaging and outpatient records reviewed, ordered.    35-year-old Macedonian-speaking male presenting with sedation/confusion.  He has a history of alcoholic hepatitis.  He cannot provide any history at present, is lethargic/somnolent.  On exam abdomen is soft with no rebound or guarding.  Labs and imaging reviewed, bilirubin is lower than in the past but still significantly elevated at 14.5.  INR 1.36.  Alcohol hepatitis, Maddrey DF score is elevated at 38.  Will start prednisolone.  Continue monitoring for alcohol withdrawal.  Recommend Dobbhoff tube placement, give lactulose and Xifaxan and start tube feeds.  No signs of cirrhosis by labs or imaging, but likely has portal hypertension.                    GI CONSULT  NOTE:    Referring Provider:     Ann Kearns APRN     Chief complaint:    Hyperbilirubinemia    Subjective      History of present illness:     Patient is a 35-year-old Macedonian-speaking male with a history of alcohol abuse was admitted to the hospital on 7/6/2025.  GI was consulted for elevated bilirubin.  His bilirubin is 14.5.  Patient has been seen in our office previously in April 2024 with a bilirubin of 30.    Patient sedated and history had to be obtained from review of the chart and speaking with nursing  staff.    LABS: Aurelia discriminant function is 38.  Sodium 134, potassium 3.3, creatinine 0.66.  Total bilirubin 14.5, alk phos 213, , ALT 47.  Lipase 34.  INR 1.36.  WBCs 12.54, hemoglobin 13.2 and platelets 66.  No imaging here.    Endo History:   None patient is known to our group under Colt Galloway    Past Medical History:  No past medical history on file.    Past Surgical History:  No past surgical history on file.    Social History:       Family History:  No family history on file.    Medications:  No medications prior to admission.       Scheduled Meds:folic acid 1 mg in sodium chloride 0.9 % 50 mL IVPB, 1 mg, Intravenous, Daily  multivitamin with minerals, 1 tablet, Oral, Daily  PHENobarbital, 5 mg/kg, Intravenous, Once   Followed by  PHENobarbital, 3 mg/kg, Intravenous, Once   Followed by  PHENobarbital, 3 mg/kg, Intravenous, Once  [START ON 7/7/2025] PHENobarbital, 65 mg, Intravenous, Once   Followed by  [START ON 7/7/2025] PHENobarbital, 65 mg, Intravenous, Once   Followed by  [START ON 7/8/2025] PHENobarbital, 32.4 mg, Oral, Once   Followed by  [START ON 7/8/2025] PHENobarbital, 32.4 mg, Oral, Once   Followed by  [START ON 7/9/2025] PHENobarbital, 32.4 mg, Oral, Once  sodium chloride, 10 mL, Intravenous, Q12H  thiamine (B-1) IV, 500 mg, Intravenous, Q8H   Followed by  [START ON 7/9/2025] thiamine (B-1) IV, 200 mg, Intravenous, Q8H   Followed by  [START ON 7/13/2025] thiamine, 100 mg, Oral, Daily      Continuous Infusions:   PRN Meds:.  aluminum-magnesium hydroxide-simethicone    senna-docusate sodium **AND** polyethylene glycol **AND** bisacodyl **AND** bisacodyl    LORazepam **OR** midazolam **OR** LORazepam **OR** midazolam **OR** midazolam **OR** midazolam    Magnesium Standard Dose Replacement - Follow Nurse / BPA Driven Protocol    sodium chloride    sodium chloride    ALLERGIES:  Patient has no known allergies.    ROS:  Review of Systems   Unable to perform ROS: Mental status change     "    Objective resting in bed.  Not responding.  Sitter at bedside.    Vital Signs:   Vitals:    07/06/25 0612 07/06/25 0751 07/06/25 0900 07/06/25 1102   BP: 103/71 119/65  105/66   BP Location: Left arm Left leg  Left leg   Patient Position: Lying Lying  Lying   Pulse: (!) 146 (!) 140     Resp: 17   17   SpO2: 94% 93%  95%   Weight:   83.1 kg (183 lb 3.2 oz)        Physical Exam:    General Appearance:  Ill-appearing in no acute distress   Head:    Normocephalic, without obvious abnormality, atraumatic   Eyes:            Conjunctivae normal, icteric sclerae, pupils equal       Throat:   No oral lesions, no thrush, oral mucosa moist   Neck:   Supple, no JVD   Lungs:      respirations regular, even and unlabored        Chest Wall:    No abnormalities observed   Abdomen:      soft, nontender, no rebound or guarding, nondistended, obese abdomen   Rectal:     Deferred   Extremities:   Moves all extremities, 2+ edema, no cyanosis   Pulses:   Pulses palpable and equal bilaterally   Skin:   No rash, + jaundice, normal palpation        Results Review:   I reviewed the patient's labs and imaging.  CBC    Results from last 7 days   Lab Units 07/06/25  0620   WBC 10*3/mm3 12.54*   HEMOGLOBIN g/dL 13.2   PLATELETS 10*3/mm3 66*     CMP   Results from last 7 days   Lab Units 07/06/25  0620   SODIUM mmol/L 134*   POTASSIUM mmol/L 3.3*   CHLORIDE mmol/L 97*   CO2 mmol/L 18.5*   BUN mg/dL 23.6*   CREATININE mg/dL 1.66*   GLUCOSE mg/dL 90   ALBUMIN g/dL 3.4*   BILIRUBIN mg/dL 14.5*   ALK PHOS U/L 213*   AST (SGOT) U/L 121*   ALT (SGPT) U/L 47*   MAGNESIUM mg/dL 2.0   LIPASE U/L 39     Cr Clearance CrCl cannot be calculated (Unknown ideal weight.).  Coag   Results from last 7 days   Lab Units 07/06/25  0620   INR  1.36*     HbA1C No results found for: \"HGBA1C\"  Blood Glucose No results found for: \"POCGLU\"  Infection     UA      Radiology(recent) No radiology results for the last day      ASSESSMENT:  Alcohol hepatitis  Elevated LFTs " related to above  Alcohol withdrawal  Electrolyte abnormalities  Thrombocytopenia due to alcohol use      PLAN:  Patient admitted into the hospital 7/6/2025.  GI was consulted for elevated bilirubin.  History of alcohol abuse with withdrawal.    Maddrey discriminant function is 38.  Will order prednisolone for alcoholic hepatitis.  Will order liver ultrasound to evaluate for signs of cirrhosis or other causes of his elevated LFTs.  [facility] protocol.  Thiamine, folic acid and zinc due to alcohol abuse.   High-protein diet when allowed/appropriate.  Place Dobbhoff tube for lactulose, Xifaxan and tube feeds.         I discussed the patient's findings and my recommendations with the patient.  RYAN Chapin  07/06/25  11:12 EDT    Time:           Electronically signed by OPAL Aguilar MD at 07/06/25 9567

## 2025-07-07 NOTE — PROGRESS NOTES
[facility] MEDICINE SERVICE  TRANSFER OF CARE/ACCEPTANCE NOTE    PATIENT NAME: Colt Luciano  : 1990  MRN: [id number]     Active Hospital Problems    Diagnosis  POA    **Alcohol withdrawal delirium [F10.931]  Yes      Resolved Hospital Problems   No resolved problems to display.       Patient seen and examined by me on 25 at 1430.  Pt sleeping comfortably in bed. No signs of distress.    Interim History: Colt Luciano is a 35 y.o. male with PMH of alcohol abuse disorder [facility] and was admitted with a principal diagnosis of Alcohol withdrawal delirium.  The following information has been obtained primarily from review of documentation and collaboration with the hospitalist as the patient is confused at the time of admission to the ICU.  The patient reportedly presented to the ED of an [facility] facility overnight for evaluation of increasing tremors, anxiousness, and abdominal pain.  The patient reported that he usually drinks 8-12 beers per day however quit drinking about 4 days ago.  Friends and his boss noted that he was having increased shaking and brought him to the ED.  Findings in the ED ED were consistent with acute kidney injury and acute liver injury.  CT of the abdomen revealed hepatomegaly with steatosis and nodular changes consistent with evolving cirrhosis.  Transfer to [facility] was requested for higher level medical management as well as subspecialty availability.  The patient was initially excepted by the hospitalist for admission to the floor however the patient quickly developed fulminant alcohol withdrawal requiring large doses of Valium and physical restraints for his safety.  The patient was then transferred to the ICU for aggressive medical management and Precedex infusion to facilitate patient care.  The patient's is primarily Georgian-speaking and provided some information via electronic      Of note,  this patient is marked for a merged chart.  The alternate name is Colt Montano medical record #[id number]        Patient is on Hospital Day: 2.   07/07/25 : The patient is awake and communicative today, able to communicate in English for examination. He remains confused and impulsive however redirects easily. Sitter at bedside for patient safety. Started a diet which is tolerated well.  Hold further antibiotics for now, infectious workup benign at this point.  He is stable for transfer out of the ICU     I have noted the following changes since admission: Pt was started on rocephin daily for SBP prophylaxis and questionable UTI, received 1 dose. Abx dc'd today during rounds by pharmacy.   -Pt without any infectious signs or sx except for tachycardia and new diarrhea, however, he was started on lactulose per GI for alcoholic hepatitis and severe hepatic steatosis on US of liver.  -No ascites on imaging  -Continue phenobarb taper and CIWA protocol    Acute toxic / metabolic encephalopathy  Likely secondary to acute alcohol withdrawal syndrome  EtOH level <10  Urine tox screen positive for barbiturates and benzodiazapine which are consistent with medications administered since admission. Remaining drugs of abuse are negative   Ammonia level 37  CT head negative for acute intracranial findings  Phenobarbital per CIWA protocol   Supportive care for now.      Alcohol abuse syndrome  Acute alcohol withdrawal syndrome  Monitor with CIWA protocol  Started phenobarbital taper per EtOH withdrawal protocol     Acute liver injury on chronic, alcohol induced liver disease  Liver ultrasound reviewed: Hepatomegaly with likely mild to moderate severity hepatic steatosis, no visualized ascites, hepatopetal flow with main portal vein  INR 1.36  Acute hepatitis panel nonreactive  GI following  Continue rifaximin & lactulose     Leukocytosis  Blood cultures pending and NTD  Urine antigens for Legionella and strep pneumo  negative  Urinalysis with reflex culture pending  Chest x-ray no acute findings  Procalcitonin 0.95  Liver ultrasound did not reveal ascites, not likely SBP    I have reviewed the H&P, diagnostic data and plan of care for Colt Luciano.  I will be taking over care of this patient during the current hospitalization.      This is a non-billable note      Signature: Electronically signed by Susan Wilhelm PA-C, 07/07/25, 15:30 EDT.  [facility] Hospitalist Team

## 2025-07-07 NOTE — PROGRESS NOTES
Critical Care Progress Note     Colt Luciano : 1990 MRN:[id number] LOS:1  Rm: 2308/1     Principal Problem: Alcohol withdrawal delirium     Reason for follow up: All the medical problems listed below    Summary     Colt Luciano is a 35 y.o. male with PMH of alcohol abuse disorder presented to the hospital of an [facility], and was admitted with a principal diagnosis of Alcohol withdrawal delirium.  The following information has been obtained primarily from review of documentation and collaboration with the hospitalist as the patient is confused at the time of admission to the ICU.  The patient reportedly presented to the ED of an [facility] facility overnight for evaluation of increasing tremors, anxiousness, and abdominal pain.  The patient reported that he usually drinks 8-12 beers per day however quit drinking about 4 days ago.  Friends and his boss noted that he was having increased shaking and brought him to the ED.  Findings in the ED ED were consistent with acute kidney injury and acute liver injury.  CT of the abdomen revealed hepatomegaly with steatosis and nodular changes consistent with evolving cirrhosis.  Transfer to [facility] was requested for higher level medical management as well as subspecialty availability.  The patient was initially excepted by the hospitalist for admission to the floor however the patient quickly developed fulminant alcohol withdrawal requiring large doses of Valium and physical restraints for his safety.  The patient was then transferred to the ICU for aggressive medical management and Precedex infusion to facilitate patient care.  The patient's is primarily Vincentian-speaking and provided some information via electronic         Of note, this patient is marked for a merged chart.  The alternate name is Colt Montano medical record #[id number]       Patient is on Hospital Day: 2.    Significant Events / Subjective     25 : The patient  is awake and communicative today, able to communicate in English for examination. He remains confused and impulsive however redirects easily. Sitter at bedside for patient safety. Started a diet which is tolerated well.  Hold further antibiotics for now, infectious workup benign at this point.  He is stable for transfer out of the ICU    Assessment / Plan     Acute toxic / metabolic encephalopathy  Likely secondary to acute alcohol withdrawal syndrome  EtOH level <10  Urine tox screen positive for barbiturates and benzodiazapine which are consistent with medications administered since admission. Remaining drugs of abuse are negative   Ammonia level 37  CT head negative for acute intracranial findings  Phenobarbital per [facility] protocol   Supportive care for now.      Alcohol abuse syndrome  Acute alcohol withdrawal syndrome  Monitor with [facility] protocol  Started phenobarbital taper per EtOH withdrawal protocol     Acute liver injury on chronic, alcohol induced liver disease  Liver ultrasound reviewed: Hepatomegaly with likely mild to moderate severity hepatic steatosis, no visualized ascites, hepatopetal flow with main portal vein  INR 1.36  Acute hepatitis panel nonreactive  GI following  Continue rifaximin & lactulose     Leukocytosis  Blood cultures pending and NTD  Urine antigens for Legionella and strep pneumo negative  Urinalysis with reflex culture pending  Chest x-ray no acute findings  Procalcitonin 0.95  Liver ultrasound did not reveal ascites, not likely SBP       Disposition:  PCU    Code status:   Code Status (Patient has no pulse and is not breathing): CPR (Attempt to Resuscitate)  Medical Interventions (Patient has pulse or is breathing): Full Support  Level Of Support Discussed With: Patient       Nutrition:   Diet: High Protein Diet; Fluid Consistency: Thin (IDDSI 0)   Patient isn't on Tube Feeding     VTE Prophylaxis:  Mechanical VTE prophylaxis orders are present.           Objective / Physical Exam      Vital signs:  Temp: 98.1 °F (36.7 °C)  BP: 95/53  Heart Rate: 85  Resp: 14  SpO2: 100 %  Weight: 79.8 kg (175 lb 14.8 oz)    Admission Weight: Weight: 83.1 kg (183 lb 3.2 oz)  Current Weight: Weight: 79.8 kg (175 lb 14.8 oz)    Input/Output in last 24 hours:    Intake/Output Summary (Last 24 hours) at 7/7/2025 1241  Last data filed at 7/7/2025 0600  Gross per 24 hour   Intake 1912 ml   Output 1250 ml   Net 662 ml      Net IO Since Admission: 662 mL [07/07/25 1241]     Physical Exam  Vitals and nursing note reviewed.   Constitutional:       General: He is not in acute distress.     Appearance: He is ill-appearing.      Comments: Tremors    HENT:      Head: Normocephalic and atraumatic.      Right Ear: External ear normal.      Left Ear: External ear normal.   Eyes:      General: Scleral icterus present.      Conjunctiva/sclera: Conjunctivae normal.   Neck:      Comments: Trachea midline  No JVD  Cardiovascular:      Heart sounds: Normal heart sounds, S1 normal and S2 normal. No murmur heard.     Comments: SR  Pulmonary:      Breath sounds: Normal breath sounds. No wheezing or rhonchi.   Abdominal:      General: There is distension.      Tenderness: There is no abdominal tenderness.   Musculoskeletal:      Right lower leg: No edema.      Left lower leg: No edema.   Skin:     General: Skin is warm and dry.      Coloration: Skin is jaundiced.   Neurological:      Mental Status: He is alert.      Comments: Confused and impulsive   Agitated at times  Tremors           Radiology and Labs     Results from last 7 days   Lab Units 07/07/25  0522 07/06/25  0620   WBC 10*3/mm3 9.46 12.54*   HEMOGLOBIN g/dL 12.1* 13.2   HEMATOCRIT % 35.6* 38.2   PLATELETS 10*3/mm3 75* 66*      Results from last 7 days   Lab Units 07/07/25  0522 07/06/25  0620   PROTIME Seconds 15.7* 16.8*   INR  1.25* 1.36*      Results from last 7 days   Lab Units 07/07/25  0522 07/06/25  1936 07/06/25  1404 07/06/25  0620   SODIUM mmol/L 138  --  133* 134*    POTASSIUM mmol/L 3.6 3.0* 3.0* 3.3*   CHLORIDE mmol/L 107  --  99 97*   CO2 mmol/L 18.3*  --  19.7* 18.5*   ANION GAP mmol/L 12.7  --  14.3 18.5*   BUN mg/dL 28.3*  --  23.0* 23.6*   CREATININE mg/dL 0.93  --  1.10 1.66*   GLUCOSE mg/dL 84  --  85 90   PHOSPHORUS mg/dL  --   --  2.5  --    MAGNESIUM mg/dL 2.6  --  2.3 2.0   ALT (SGPT) U/L 56*  --   --  47*   AST (SGOT) U/L 135*  --   --  121*   ALK PHOS U/L 174*  --   --  213*      Results from last 7 days   Lab Units 07/07/25  0522 07/06/25  0620   ALT (SGPT) U/L 56* 47*   AST (SGOT) U/L 135* 121*   ALK PHOS U/L 174* 213*           CT Head Without Contrast  Result Date: 7/6/2025  No acute intracranial abnormality. Electronically Signed: Rashaad Joel MD  7/6/2025 6:35 PM EDT  Workstation ID: [id number]    XR Chest 1 View  Result Date: 7/6/2025  Impression: 1.Slightly elevated right hemidiaphragm. 2.Bandlike right perihilar and medial basilar scarring versus subsegmental atelectasis. Electronically Signed: Roderick Melton MD  7/6/2025 6:23 PM EDT  Workstation ID: [id number]    US Liver  Result Date: 7/6/2025  Impression: 1. Hepatomegaly with likely mild to moderate severity hepatic steatosis. 2. No sonographic evidence of hepatocellular carcinoma. 3. No visualized ascites. Hepatopetal flow of main portal vein. Electronically Signed: Trevor Marks MD  7/6/2025 4:25 PM EDT  Workstation ID: [id number]      Current medications     Scheduled Meds:   folic acid 1 mg in sodium chloride 0.9 % 50 mL IVPB, 1 mg, Intravenous, Daily  lactulose, 30 g, Oral, TID  multivitamin with minerals, 1 tablet, Oral, Daily  pantoprazole, 40 mg, Intravenous, Q AM  PHENobarbital, 65 mg, Intravenous, Once   Followed by  [START ON 7/8/2025] PHENobarbital, 32.4 mg, Oral, Once   Followed by  [START ON 7/8/2025] PHENobarbital, 32.4 mg, Oral, Once   Followed by  [START ON 7/9/2025] PHENobarbital, 32.4 mg, Oral, Once  prednisoLONE sodium phosphate, 40 mg, Oral, Daily  rifAXIMin, 550 mg, Oral,  Q12H  sodium chloride, 10 mL, Intravenous, Q12H  thiamine (B-1) IV, 500 mg, Intravenous, Q8H   Followed by  [START ON 7/9/2025] thiamine (B-1) IV, 200 mg, Intravenous, Q8H   Followed by  [START ON 7/13/2025] thiamine, 100 mg, Oral, Daily  zinc sulfate, 220 mg, Oral, Daily        Continuous Infusions:          Plan discussed with RN. Reviewed all other data in the last 24 hours, including but not limited to vitals, labs, microbiology, imaging and pertinent notes from other providers.      RYAN Solomon   Critical Care  07/07/25   12:41 EDT

## 2025-07-08 LAB
ALBUMIN SERPL-MCNC: 3.2 G/DL (ref 3.5–5.2)
ALBUMIN/GLOB SERPL: 1 G/DL
ALP SERPL-CCNC: 193 U/L (ref 39–117)
ALT SERPL W P-5'-P-CCNC: 71 U/L (ref 1–41)
AMMONIA BLD-SCNC: 93 UMOL/L (ref 16–60)
ANION GAP SERPL CALCULATED.3IONS-SCNC: 10.8 MMOL/L (ref 5–15)
AST SERPL-CCNC: 122 U/L (ref 1–40)
BACTERIA SPEC AEROBE CULT: NO GROWTH
BASOPHILS # BLD AUTO: 0.02 10*3/MM3 (ref 0–0.2)
BASOPHILS NFR BLD AUTO: 0.2 % (ref 0–1.5)
BILIRUB SERPL-MCNC: 8.1 MG/DL (ref 0–1.2)
BUN SERPL-MCNC: 23.2 MG/DL (ref 6–20)
BUN/CREAT SERPL: 33.6 (ref 7–25)
CALCIUM SPEC-SCNC: 8.3 MG/DL (ref 8.6–10.5)
CHLORIDE SERPL-SCNC: 114 MMOL/L (ref 98–107)
CO2 SERPL-SCNC: 18.2 MMOL/L (ref 22–29)
CREAT SERPL-MCNC: 0.69 MG/DL (ref 0.76–1.27)
DEPRECATED RDW RBC AUTO: 57.9 FL (ref 37–54)
EGFRCR SERPLBLD CKD-EPI 2021: 125.3 ML/MIN/1.73
EOSINOPHIL # BLD AUTO: 0.02 10*3/MM3 (ref 0–0.4)
EOSINOPHIL NFR BLD AUTO: 0.2 % (ref 0.3–6.2)
ERYTHROCYTE [DISTWIDTH] IN BLOOD BY AUTOMATED COUNT: 16.8 % (ref 12.3–15.4)
GLOBULIN UR ELPH-MCNC: 3.3 GM/DL
GLUCOSE BLDC GLUCOMTR-MCNC: 129 MG/DL (ref 70–105)
GLUCOSE SERPL-MCNC: 137 MG/DL (ref 65–99)
HCT VFR BLD AUTO: 36.9 % (ref 37.5–51)
HGB BLD-MCNC: 12.5 G/DL (ref 13–17.7)
IMM GRANULOCYTES # BLD AUTO: 0.04 10*3/MM3 (ref 0–0.05)
IMM GRANULOCYTES NFR BLD AUTO: 0.4 % (ref 0–0.5)
INR PPP: 1.2 (ref 0.9–1.1)
LYMPHOCYTES # BLD AUTO: 1.3 10*3/MM3 (ref 0.7–3.1)
LYMPHOCYTES NFR BLD AUTO: 13.7 % (ref 19.6–45.3)
MAGNESIUM SERPL-MCNC: 2.4 MG/DL (ref 1.6–2.6)
MCH RBC QN AUTO: 32.3 PG (ref 26.6–33)
MCHC RBC AUTO-ENTMCNC: 33.9 G/DL (ref 31.5–35.7)
MCV RBC AUTO: 95.3 FL (ref 79–97)
MONOCYTES # BLD AUTO: 0.91 10*3/MM3 (ref 0.1–0.9)
MONOCYTES NFR BLD AUTO: 9.6 % (ref 5–12)
NEUTROPHILS NFR BLD AUTO: 7.18 10*3/MM3 (ref 1.7–7)
NEUTROPHILS NFR BLD AUTO: 75.9 % (ref 42.7–76)
NRBC BLD AUTO-RTO: 0 /100 WBC (ref 0–0.2)
PHOSPHATE SERPL-MCNC: 1.4 MG/DL (ref 2.5–4.5)
PHOSPHATE SERPL-MCNC: 2 MG/DL (ref 2.5–4.5)
PLATELET # BLD AUTO: 112 10*3/MM3 (ref 140–450)
PMV BLD AUTO: 11.4 FL (ref 6–12)
POTASSIUM SERPL-SCNC: 3.4 MMOL/L (ref 3.5–5.2)
POTASSIUM SERPL-SCNC: 3.9 MMOL/L (ref 3.5–5.2)
PROT SERPL-MCNC: 6.5 G/DL (ref 6–8.5)
PROTHROMBIN TIME: 15.2 SECONDS (ref 11.7–14.2)
QT INTERVAL: 367 MS
QTC INTERVAL: 498 MS
RBC # BLD AUTO: 3.87 10*6/MM3 (ref 4.14–5.8)
SODIUM SERPL-SCNC: 143 MMOL/L (ref 136–145)
WBC NRBC COR # BLD AUTO: 9.47 10*3/MM3 (ref 3.4–10.8)

## 2025-07-08 PROCEDURE — 25010000002 ZIPRASIDONE MESYLATE PER 10 MG: Performed by: STUDENT IN AN ORGANIZED HEALTH CARE EDUCATION/TRAINING PROGRAM

## 2025-07-08 PROCEDURE — 82948 REAGENT STRIP/BLOOD GLUCOSE: CPT

## 2025-07-08 PROCEDURE — 82140 ASSAY OF AMMONIA: CPT | Performed by: EMERGENCY MEDICINE

## 2025-07-08 PROCEDURE — 85025 COMPLETE CBC W/AUTO DIFF WBC: CPT | Performed by: NURSE PRACTITIONER

## 2025-07-08 PROCEDURE — 84132 ASSAY OF SERUM POTASSIUM: CPT | Performed by: STUDENT IN AN ORGANIZED HEALTH CARE EDUCATION/TRAINING PROGRAM

## 2025-07-08 PROCEDURE — 25010000002 MIDAZOLAM PER 1 MG

## 2025-07-08 PROCEDURE — 80053 COMPREHEN METABOLIC PANEL: CPT | Performed by: NURSE PRACTITIONER

## 2025-07-08 PROCEDURE — 83735 ASSAY OF MAGNESIUM: CPT | Performed by: NURSE PRACTITIONER

## 2025-07-08 PROCEDURE — 25010000002 POTASSIUM CHLORIDE 10 MEQ/100ML SOLUTION: Performed by: STUDENT IN AN ORGANIZED HEALTH CARE EDUCATION/TRAINING PROGRAM

## 2025-07-08 PROCEDURE — 85610 PROTHROMBIN TIME: CPT | Performed by: NURSE PRACTITIONER

## 2025-07-08 PROCEDURE — 25010000002 ENOXAPARIN PER 10 MG: Performed by: EMERGENCY MEDICINE

## 2025-07-08 PROCEDURE — 25010000002 PHENOBARBITAL PER 120 MG: Performed by: STUDENT IN AN ORGANIZED HEALTH CARE EDUCATION/TRAINING PROGRAM

## 2025-07-08 PROCEDURE — 25810000003 SODIUM CHLORIDE 0.9 % SOLUTION: Performed by: STUDENT IN AN ORGANIZED HEALTH CARE EDUCATION/TRAINING PROGRAM

## 2025-07-08 PROCEDURE — 99231 SBSQ HOSP IP/OBS SF/LOW 25: CPT

## 2025-07-08 PROCEDURE — 25010000002 THIAMINE PER 100 MG: Performed by: NURSE PRACTITIONER

## 2025-07-08 PROCEDURE — 25010000002 FOLIC ACID 5 MG/ML SOLUTION 10 ML VIAL: Performed by: NURSE PRACTITIONER

## 2025-07-08 PROCEDURE — 84100 ASSAY OF PHOSPHORUS: CPT | Performed by: STUDENT IN AN ORGANIZED HEALTH CARE EDUCATION/TRAINING PROGRAM

## 2025-07-08 PROCEDURE — 84100 ASSAY OF PHOSPHORUS: CPT

## 2025-07-08 RX ORDER — POTASSIUM CHLORIDE 7.45 MG/ML
10 INJECTION INTRAVENOUS
Status: COMPLETED | OUTPATIENT
Start: 2025-07-08 | End: 2025-07-08

## 2025-07-08 RX ORDER — ENOXAPARIN SODIUM 100 MG/ML
40 INJECTION SUBCUTANEOUS EVERY 24 HOURS
Status: DISCONTINUED | OUTPATIENT
Start: 2025-07-08 | End: 2025-07-10 | Stop reason: HOSPADM

## 2025-07-08 RX ORDER — FENTANYL/ROPIVACAINE/NS/PF 2-625MCG/1
15 PLASTIC BAG, INJECTION (ML) EPIDURAL ONCE
Status: COMPLETED | OUTPATIENT
Start: 2025-07-08 | End: 2025-07-08

## 2025-07-08 RX ORDER — DEXMEDETOMIDINE HYDROCHLORIDE 4 UG/ML
.2-1.5 INJECTION, SOLUTION INTRAVENOUS
Status: DISCONTINUED | OUTPATIENT
Start: 2025-07-08 | End: 2025-07-08

## 2025-07-08 RX ORDER — PHENOBARBITAL SODIUM 65 MG/ML
32.4 INJECTION, SOLUTION INTRAMUSCULAR; INTRAVENOUS ONCE
Status: COMPLETED | OUTPATIENT
Start: 2025-07-08 | End: 2025-07-08

## 2025-07-08 RX ADMIN — THIAMINE HYDROCHLORIDE 500 MG: 100 INJECTION, SOLUTION INTRAMUSCULAR; INTRAVENOUS at 15:30

## 2025-07-08 RX ADMIN — FOLIC ACID 1 MG: 5 INJECTION, SOLUTION INTRAMUSCULAR; INTRAVENOUS; SUBCUTANEOUS at 10:09

## 2025-07-08 RX ADMIN — LORAZEPAM 1 MG: 1 TABLET ORAL at 22:07

## 2025-07-08 RX ADMIN — THIAMINE HYDROCHLORIDE 500 MG: 100 INJECTION, SOLUTION INTRAMUSCULAR; INTRAVENOUS at 05:01

## 2025-07-08 RX ADMIN — POTASSIUM CHLORIDE 10 MEQ: 7.46 INJECTION, SOLUTION INTRAVENOUS at 11:00

## 2025-07-08 RX ADMIN — Medication 10 ML: at 20:04

## 2025-07-08 RX ADMIN — POTASSIUM CHLORIDE 10 MEQ: 7.46 INJECTION, SOLUTION INTRAVENOUS at 10:09

## 2025-07-08 RX ADMIN — LACTULOSE SOLUTION USP, 10 G/15 ML 30 G: 10 SOLUTION ORAL; RECTAL at 15:30

## 2025-07-08 RX ADMIN — PHENOBARBITAL SODIUM 32.4 MG: 65 INJECTION, SOLUTION INTRAMUSCULAR; INTRAVENOUS at 05:01

## 2025-07-08 RX ADMIN — LACTULOSE SOLUTION USP, 10 G/15 ML 30 G: 10 SOLUTION ORAL; RECTAL at 20:01

## 2025-07-08 RX ADMIN — FAMOTIDINE 20 MG: 20 TABLET, FILM COATED ORAL at 17:47

## 2025-07-08 RX ADMIN — POTASSIUM PHOSPHATE 15 MMOL: 236; 224 INJECTION, SOLUTION INTRAVENOUS at 08:27

## 2025-07-08 RX ADMIN — DEXMEDETOMIDINE HYDROCHLORIDE 0.2 MCG/KG/HR: 4 INJECTION, SOLUTION INTRAVENOUS at 01:31

## 2025-07-08 RX ADMIN — POTASSIUM CHLORIDE 10 MEQ: 7.46 INJECTION, SOLUTION INTRAVENOUS at 09:36

## 2025-07-08 RX ADMIN — PHENOBARBITAL 32.4 MG: 32.4 TABLET ORAL at 17:47

## 2025-07-08 RX ADMIN — RIFAXIMIN 550 MG: 550 TABLET ORAL at 20:02

## 2025-07-08 RX ADMIN — ENOXAPARIN SODIUM 40 MG: 100 INJECTION SUBCUTANEOUS at 15:30

## 2025-07-08 RX ADMIN — THIAMINE HYDROCHLORIDE 500 MG: 100 INJECTION, SOLUTION INTRAMUSCULAR; INTRAVENOUS at 21:53

## 2025-07-08 RX ADMIN — DEXMEDETOMIDINE HYDROCHLORIDE 1.5 MCG/KG/HR: 4 INJECTION, SOLUTION INTRAVENOUS at 03:28

## 2025-07-08 RX ADMIN — MIDAZOLAM 4 MG: 1 INJECTION INTRAMUSCULAR; INTRAVENOUS at 00:22

## 2025-07-08 RX ADMIN — ZIPRASIDONE MESYLATE 10 MG: 20 INJECTION, POWDER, LYOPHILIZED, FOR SOLUTION INTRAMUSCULAR at 00:57

## 2025-07-08 RX ADMIN — POTASSIUM CHLORIDE 10 MEQ: 7.46 INJECTION, SOLUTION INTRAVENOUS at 07:56

## 2025-07-08 RX ADMIN — Medication 10 ML: at 09:37

## 2025-07-08 RX ADMIN — Medication 2 PACKET: at 20:00

## 2025-07-08 NOTE — SIGNIFICANT NOTE
Patient received significant amount of Ativan, Geodon, and on phebobarb taper. Agitation worsening. Upgraded back to ICU for Precedex.     Electronically signed by RYAN Altamirano, 07/08/25, 1:34 AM EDT.

## 2025-07-08 NOTE — PLAN OF CARE
Goal Outcome Evaluation:                 4 point NV restraints discontinued

## 2025-07-08 NOTE — PLAN OF CARE
Goal Outcome Evaluation:   Pt status and vital signs stable entire shift. CIWA score continued to rise despite versed administrations, pt becoming more agitated and combative with the versed. Pt actively and increasingly combative, uncooperative, restless, and verbally abusive, hitting and kicking towards staff. Provider notified and geodon given as well as 4 point restraints applied. Pt increasingly agitated following these interventions and provider aware, recommended intensivist services and precedex gtt now infusing, and quickly maxxed. Pt now more restful at this time but still easily agitated. Pt remains in 4 point restraints at this time, without injury. No urine, bladder scan and intermittent straight cath performed. Increased stool output throughout the shift. Pt denied any pain. When awake, pt confused to place and situation.

## 2025-07-08 NOTE — SIGNIFICANT NOTE
07/08/25 0822   OTHER   Discipline physical therapist   Rehab Time/Intention   Session Not Performed unable to evaluate, medical status change;other (see comments)  (severe confusion; in 4 point restraints at this time; hold PT today)   Recommendation   PT - Next Appointment 07/09/25

## 2025-07-08 NOTE — PROGRESS NOTES
Critical Care Progress Note     Colt Luciano : 1990 MRN:[id number] LOS:2  Rm: 2308/1     Principal Problem: Alcohol withdrawal delirium     Reason for follow up: All the medical problems listed below    Summary     Colt Luciano is a 35 y.o. male with PMH of alcohol abuse disorder presented to the hospital of an [facility], and was admitted with a principal diagnosis of Alcohol withdrawal delirium.  The following information has been obtained primarily from review of documentation and collaboration with the hospitalist as the patient is confused at the time of admission to the ICU.  The patient reportedly presented to the ED of an [facility] facility overnight for evaluation of increasing tremors, anxiousness, and abdominal pain.  The patient reported that he usually drinks 8-12 beers per day however quit drinking about 4 days ago.  Friends and his boss noted that he was having increased shaking and brought him to the ED.  Findings in the ED ED were consistent with acute kidney injury and acute liver injury.  CT of the abdomen revealed hepatomegaly with steatosis and nodular changes consistent with evolving cirrhosis.  Transfer to [facility] was requested for higher level medical management as well as subspecialty availability.  The patient was initially excepted by the hospitalist for admission to the floor however the patient quickly developed fulminant alcohol withdrawal requiring large doses of Valium and physical restraints for his safety.  The patient was then transferred to the ICU for aggressive medical management and Precedex infusion to facilitate patient care.  The patient's is primarily [place]ib[place]ian-speaking and provided some information via electronic         Of note, this patient is marked for a merged chart.  The alternate name is Colt Montano medical record #[id number]       :The patient is awake and communicative today, able to communicate in English for  examination. He remains confused and impulsive however redirects easily. Sitter at bedside for patient safety. Started a diet which is tolerated well.  Hold further antibiotics for now, infectious workup benign at this point.  He is stable for transfer out of the ICU    Patient is on Hospital Day: 3.    Significant Events / Subjective     07/08/25 : Patient was reupgraded to the ICU because he was combative and aggressive. Given geodon as well as a significant amount of versed and subsequently required precedex. Was in 4 point restraints for violence. This AM the precedex was weaned off. The patient is comfortable, calm, and conversant. Vital signs are stable. No restraints      If patient is agitated/delirious overnight please make sure to have an  to help him understand.     Assessment / Plan     Acute toxic / metabolic encephalopathy  Hospital acquired delirium  Likely secondary to acute alcohol withdrawal syndrome  EtOH level <10  Urine tox screen positive for barbiturates and benzodiazapine which are consistent with medications administered since admission. Remaining drugs of abuse are negative   Ammonia level 37  CT head negative for acute intracranial findings  Phenobarbital per CIWA protocol   Supportive care for now.   Precedex as needed for now  Delirium precautions     Alcohol abuse syndrome  Acute alcohol withdrawal syndrome  Monitor with CIWA protocol  Started phenobarbital taper per EtOH withdrawal protocol     Acute liver injury on chronic, alcohol induced liver disease  Hyperammonemia  Liver ultrasound reviewed: Hepatomegaly with likely mild to moderate severity hepatic steatosis, no visualized ascites, hepatopetal flow with main portal vein  INR 1.36  Acute hepatitis panel nonreactive  GI following  Continue rifaximin & lactulose  Ammonia this AM was 93     Leukocytosis  Blood cultures pending and NTD  Urine antigens for Legionella and strep pneumo negative  Urinalysis with reflex culture  pending  Chest x-ray no acute findings  Procalcitonin 0.95  Liver ultrasound did not reveal ascites, not likely SBP       Disposition:  PCU    Code status:   Code Status (Patient has no pulse and is not breathing): CPR (Attempt to Resuscitate)  Medical Interventions (Patient has pulse or is breathing): Full Support  Level Of Support Discussed With: Patient       Nutrition:   Diet: High Protein Diet; Fluid Consistency: Thin (IDDSI 0)   Patient isn't on Tube Feeding     VTE Prophylaxis:  Pharmacologic & mechanical VTE prophylaxis orders are present.           Objective / Physical Exam     Vital signs:  Temp: 97.5 °F (36.4 °C)  BP: 105/69  Heart Rate: 72  Resp: (P) 13  SpO2: 96 %  Weight: 79.8 kg (175 lb 14.8 oz)    Admission Weight: Weight: 83.1 kg (183 lb 3.2 oz)  Current Weight: Weight: 79.8 kg (175 lb 14.8 oz)    Input/Output in last 24 hours:    Intake/Output Summary (Last 24 hours) at 7/8/2025 1244  Last data filed at 7/8/2025 0400  Gross per 24 hour   Intake 1460 ml   Output 750 ml   Net 710 ml      Net IO Since Admission: 1,322 mL [07/08/25 1244]     Physical Exam  Vitals and nursing note reviewed.   Constitutional:       General: He is not in acute distress.     Appearance: He is not ill-appearing.      Comments: Sitting up in bed in NAD   HENT:      Head: Normocephalic and atraumatic.      Right Ear: External ear normal.      Left Ear: External ear normal.   Eyes:      General: Scleral icterus present.      Conjunctiva/sclera: Conjunctivae normal.   Neck:      Comments: Trachea midline  No JVD  Cardiovascular:      Heart sounds: Normal heart sounds, S1 normal and S2 normal. No murmur heard.     Comments: SR  Pulmonary:      Breath sounds: Normal breath sounds. No wheezing or rhonchi.   Abdominal:      General: There is distension.      Tenderness: There is no abdominal tenderness.   Musculoskeletal:      Right lower leg: No edema.      Left lower leg: No edema.   Skin:     General: Skin is warm and dry.       Coloration: Skin is jaundiced.   Neurological:      General: No focal deficit present.      Mental Status: He is alert.      Sensory: No sensory deficit.      Motor: No weakness.          Radiology and Labs     Results from last 7 days   Lab Units 07/08/25  0509 07/07/25 0522 07/06/25 0620   WBC 10*3/mm3 9.47 9.46 12.54*   HEMOGLOBIN g/dL 12.5* 12.1* 13.2   HEMATOCRIT % 36.9* 35.6* 38.2   PLATELETS 10*3/mm3 112* 75* 66*      Results from last 7 days   Lab Units 07/08/25  0509 07/07/25 0522 07/06/25 0620   PROTIME Seconds 15.2* 15.7* 16.8*   INR  1.20* 1.25* 1.36*      Results from last 7 days   Lab Units 07/08/25  0509 07/07/25 0522 07/06/25  1936 07/06/25  1404 07/06/25  0620   SODIUM mmol/L 143 138  --  133* 134*   POTASSIUM mmol/L 3.4* 3.6 3.0* 3.0* 3.3*   CHLORIDE mmol/L 114* 107  --  99 97*   CO2 mmol/L 18.2* 18.3*  --  19.7* 18.5*   ANION GAP mmol/L 10.8 12.7  --  14.3 18.5*   BUN mg/dL 23.2* 28.3*  --  23.0* 23.6*   CREATININE mg/dL 0.69* 0.93  --  1.10 1.66*   GLUCOSE mg/dL 137* 84  --  85 90   PHOSPHORUS mg/dL 2.0*  --   --  2.5  --    MAGNESIUM mg/dL 2.4 2.6  --  2.3 2.0   ALT (SGPT) U/L 71* 56*  --   --  47*   AST (SGOT) U/L 122* 135*  --   --  121*   ALK PHOS U/L 193* 174*  --   --  213*      Results from last 7 days   Lab Units 07/08/25  0509 07/07/25 0522 07/06/25  0620   ALT (SGPT) U/L 71* 56* 47*   AST (SGOT) U/L 122* 135* 121*   ALK PHOS U/L 193* 174* 213*           CT Head Without Contrast  Result Date: 7/6/2025  No acute intracranial abnormality. Electronically Signed: Rashaad Joel MD  7/6/2025 6:35 PM EDT  Workstation ID: [id number]    XR Chest 1 View  Result Date: 7/6/2025  Impression: 1.Slightly elevated right hemidiaphragm. 2.Bandlike right perihilar and medial basilar scarring versus subsegmental atelectasis. Electronically Signed: Roderick Melton MD  7/6/2025 6:23 PM EDT  Workstation ID: [id number]    US Liver  Result Date: 7/6/2025  Impression: 1. Hepatomegaly with likely mild to moderate  severity hepatic steatosis. 2. No sonographic evidence of hepatocellular carcinoma. 3. No visualized ascites. Hepatopetal flow of main portal vein. Electronically Signed: Trevor Marks MD  7/6/2025 4:25 PM EDT  Workstation ID: [id number]      Current medications     Scheduled Meds:   enoxaparin sodium, 40 mg, Subcutaneous, Q24H  famotidine, 20 mg, Oral, BID AC  folic acid 1 mg in sodium chloride 0.9 % 50 mL IVPB, 1 mg, Intravenous, Daily  lactulose, 30 g, Oral, TID  multivitamin with minerals, 1 tablet, Oral, Daily  PHENobarbital, 32.4 mg, Oral, Once   Followed by  PHENobarbital, 32.4 mg, Oral, Once   Followed by  [START ON 7/9/2025] PHENobarbital, 32.4 mg, Oral, Once  prednisoLONE sodium phosphate, 40 mg, Oral, Daily  rifAXIMin, 550 mg, Oral, Q12H  sodium chloride, 10 mL, Intravenous, Q12H  thiamine (B-1) IV, 500 mg, Intravenous, Q8H   Followed by  [START ON 7/9/2025] thiamine (B-1) IV, 200 mg, Intravenous, Q8H   Followed by  [START ON 7/13/2025] thiamine, 100 mg, Oral, Daily  zinc sulfate, 220 mg, Oral, Daily        Continuous Infusions:          Plan discussed with RN. Reviewed all other data in the last 24 hours, including but not limited to vitals, labs, microbiology, imaging and pertinent notes from other providers.      Tung Fernandez MD   Critical Care  07/08/25   12:44 EDT

## 2025-07-08 NOTE — PROGRESS NOTES
LOS: 2 days   Patient Care Team:  Joyce Shannon APRN as PCP - General (Nurse Practitioner)      Subjective     Interval History:     Subjective: Due to altered mental status, history is supplemented via chart review and bedside RN.  Patient became agitated overnight and required higher dose of Precedex and wrist restraints.  Unable to arouse patient with verbal or physical stimuli this morning.  No nausea, vomiting, BRBPR, or melena noted overnight.    ROS:   Unable to assess due to altered status.     Medication Review:     Current Facility-Administered Medications:     aluminum-magnesium hydroxide-simethicone (MAALOX MAX) 400-400-40 MG/5ML suspension 15 mL, 15 mL, Oral, Q6H PRN, Ann Kearns, APRN    sennosides-docusate (PERICOLACE) 8.6-50 MG per tablet 2 tablet, 2 tablet, Oral, BID PRN **AND** polyethylene glycol (MIRALAX) packet 17 g, 17 g, Oral, Daily PRN **AND** bisacodyl (DULCOLAX) EC tablet 5 mg, 5 mg, Oral, Daily PRN **AND** bisacodyl (DULCOLAX) suppository 10 mg, 10 mg, Rectal, Daily PRN, Ann Kearns, APRN    Calcium Replacement - Follow Nurse / BPA Driven Protocol, , Not Applicable, PRN, Bryan De La Garza DO    famotidine (PEPCID) tablet 20 mg, 20 mg, Oral, BID Balaji CAMPBELL Rehan, MD    folic acid 1 mg in sodium chloride 0.9 % 50 mL IVPB, 1 mg, Intravenous, Daily, Day, Lucila G, APRN, Last Rate: 100 mL/hr at 07/07/25 1027, 1 mg at 07/07/25 1027    HYDROmorphone (DILAUDID) injection 0.5 mg, 0.5 mg, Intravenous, Q2H PRN, Angelique López APRN, 0.5 mg at 07/07/25 0255    lactulose (CHRONULAC) 10 GM/15ML solution 30 g, 30 g, Oral, TID, Caitlin Alvarado, APRN, 30 g at 07/07/25 2027    loperamide (IMODIUM) capsule 2 mg, 2 mg, Oral, 4x Daily PRN, Susan Wilhelm PA-C    LORazepam (ATIVAN) tablet 1 mg, 1 mg, Oral, Q1H PRN **OR** midazolam (VERSED) injection 2 mg, 2 mg, Intravenous, Q1H PRN **OR** LORazepam (ATIVAN) tablet 2 mg, 2 mg, Oral, Q1H PRN, 2 mg at 07/07/25 2027 **OR**  midazolam (VERSED) injection 4 mg, 4 mg, Intravenous, Q1H PRN, 4 mg at 07/07/25 2329 **OR** midazolam (VERSED) injection 4 mg, 4 mg, Intravenous, Q15 Min PRN, 4 mg at 07/08/25 0022 **OR** midazolam (VERSED) injection 4 mg, 4 mg, Intramuscular, Q15 Min PRN, Susan Wilhelm PA-C    Magnesium Standard Dose Replacement - Follow Nurse / BPA Driven Protocol, , Not Applicable, PRN, Ann Kearns APRN    Magnesium Standard Dose Replacement - Follow Nurse / BPA Driven Protocol, , Not Applicable, PRN, Bryan De La Garza DO    multivitamin with minerals 1 tablet, 1 tablet, Oral, Daily, Day, Lucila ZIMMERMAN, APRN, 1 tablet at 07/07/25 1027    nitroglycerin (NITROSTAT) SL tablet 0.4 mg, 0.4 mg, Sublingual, Q5 Min PRN, Bryan De La Garza DO    [COMPLETED] PHENobarbital injection 65 mg, 65 mg, Intravenous, Once, 65 mg at 07/07/25 0504 **FOLLOWED BY** [COMPLETED] PHENobarbital injection 65 mg, 65 mg, Intravenous, Once, 65 mg at 07/07/25 1857 **FOLLOWED BY** PHENobarbital tablet 32.4 mg, 32.4 mg, Oral, Once **FOLLOWED BY** PHENobarbital tablet 32.4 mg, 32.4 mg, Oral, Once **FOLLOWED BY** [START ON 7/9/2025] PHENobarbital tablet 32.4 mg, 32.4 mg, Oral, Once, Day, Lucila ZIMMERMAN, APRN    Phosphorus Replacement - Follow Nurse / BPA Driven Protocol, , Not Applicable, PRN, Bryan De La Garza, DO    potassium chloride 10 mEq in 100 mL IVPB, 10 mEq, Intravenous, Q1H, Dodie Linda APRN, Last Rate: 100 mL/hr at 07/08/25 0936, 10 mEq at 07/08/25 0936    potassium phosphate 15 mmol in 0.9% normal saline 250 mL IVPB, 15 mmol, Intravenous, Once, Dodie Linda APRN, 15 mmol at 07/08/25 0827    Potassium Replacement - Follow Nurse / BPA Driven Protocol, , Not Applicable, PRN, Bryan De La Garza DO    prednisoLONE sodium phosphate (ORAPRED) 15 MG/5ML oral solution 40 mg, 40 mg, Oral, Daily, Caitlin Alvarado APRN, 40 mg at 07/07/25 1028    riFAXIMin (XIFAXAN) tablet 550 mg, 550 mg, Oral, Q12H, Caitlin Alvarado APRN, 550 mg at  07/07/25 2027    sodium chloride 0.9 % flush 10 mL, 10 mL, Intravenous, Q12H, Achterberg, Ann, APRN, 10 mL at 07/08/25 0937    sodium chloride 0.9 % flush 10 mL, 10 mL, Intravenous, PRN, Achterberg, Ann, APRN, 10 mL at 07/06/25 0808    sodium chloride 0.9 % infusion 40 mL, 40 mL, Intravenous, PRN, Achterberg, Ann, APRN    thiamine (B-1) 500 mg in sodium chloride 0.9 % 100 mL IVPB, 500 mg, Intravenous, Q8H, Last Rate: 200 mL/hr at 07/08/25 0501, 500 mg at 07/08/25 0501 **FOLLOWED BY** [START ON 7/9/2025] thiamine (B-1) injection 200 mg, 200 mg, Intravenous, Q8H **FOLLOWED BY** [START ON 7/13/2025] thiamine (VITAMIN B-1) tablet 100 mg, 100 mg, Oral, Daily, Day, Lucila ZIMMERMAN APRN    zinc sulfate (ZINCATE) capsule 220 mg, 220 mg, Oral, Daily, Caitlin Alvarado, APRN, 220 mg at 07/07/25 1028      Objective     Vital Signs  Vitals:    07/08/25 0700 07/08/25 0800 07/08/25 0842 07/08/25 0900   BP: 166/69 126/86  105/69   BP Location:       Patient Position:       Pulse: 75 74 72 72   Resp:  11     Temp:  97.8 °F (36.6 °C)     TempSrc:  Oral     SpO2: 96% 95% 96% 96%   Weight:       Height:           Physical Exam:     General Appearance:  Obtunded, sedated with Precedex, in no acute distress   Head:    Normocephalic, without obvious abnormality   Eyes:          Conjunctivae normal, anicteric sclera         Neck:   supple, no JVD   Lungs:     respirations regular, even and unlabored, room air   Abdomen:     Soft, no signs of tenderness with palpation, non-distended         Extremities:   No edema, no cyanosis   Skin:   No bruising or rash, + jaundice        Results Review:    Results from last 7 days   Lab Units 07/08/25  0509 07/07/25  0522 07/06/25  0620   WBC 10*3/mm3 9.47 9.46 12.54*   HEMOGLOBIN g/dL 12.5* 12.1* 13.2   PLATELETS 10*3/mm3 112* 75* 66*     Results from last 7 days   Lab Units 07/08/25  0509 07/07/25  0522 07/06/25  1936 07/06/25  1404 07/06/25  0620   SODIUM mmol/L 143 138  --  133* 134*  "  POTASSIUM mmol/L 3.4* 3.6 3.0* 3.0* 3.3*   CHLORIDE mmol/L 114* 107  --  99 97*   CO2 mmol/L 18.2* 18.3*  --  19.7* 18.5*   BUN mg/dL 23.2* 28.3*  --  23.0* 23.6*   CREATININE mg/dL 0.69* 0.93  --  1.10 1.66*   GLUCOSE mg/dL 137* 84  --  85 90   ALBUMIN g/dL 3.2* 3.1*  --   --  3.4*   BILIRUBIN mg/dL 8.1* 12.6*  --   --  14.5*   ALK PHOS U/L 193* 174*  --   --  213*   AST (SGOT) U/L 122* 135*  --   --  121*   ALT (SGPT) U/L 71* 56*  --   --  47*   INR  1.20* 1.25*  --   --  1.36*   MAGNESIUM mg/dL 2.4 2.6  --  2.3 2.0   PHOSPHORUS mg/dL 2.0*  --   --  2.5  --    LIPASE U/L  --   --   --   --  39   AMMONIA umol/L  --   --  37  --   --      Estimated Creatinine Clearance: 148.2 mL/min (A) (by C-G formula based on SCr of 0.69 mg/dL (L)).  No results found for: \"HGBA1C\"      Infection   Results from last 7 days   Lab Units 07/06/25 1944 07/06/25  1913 07/06/25  1811 07/06/25  1404   BLOODCX  No growth at 24 hours No growth at 24 hours  --   --    URINECX   --   --  No growth  --    PROCALCITONIN ng/mL  --   --   --  0.95*     UA    Results from last 7 days   Lab Units 07/06/25 1811   NITRITE UA  Positive*   WBC UA /HPF 6-10*   BACTERIA UA /HPF None Seen   SQUAM EPITHEL UA /HPF 3-6*   URINECX  No growth     Microbiology Results (last 10 days)       Procedure Component Value - Date/Time    Blood Culture - Blood, Arm, Left [[id number]]  (Normal) Collected: 07/06/25 1944    Lab Status: Preliminary result Specimen: Blood from Arm, Left Updated: 07/07/25 2000     Blood Culture No growth at 24 hours    MRSA Screen, PCR (Inpatient) - Swab, Nares [[id number]]  (Normal) Collected: 07/06/25 1930    Lab Status: Final result Specimen: Swab from Nares Updated: 07/06/25 2131     MRSA PCR No MRSA Detected    Narrative:      The negative predictive value of this diagnostic test is high and should only be used to consider de-escalating anti-MRSA therapy. A positive result may indicate colonization with MRSA and must be correlated " clinically.    Blood Culture - Blood, Arm, Right [[id number]]  (Normal) Collected: 07/06/25 1913    Lab Status: Preliminary result Specimen: Blood from Arm, Right Updated: 07/07/25 1931     Blood Culture No growth at 24 hours    Narrative:      Less than seven (7) mL's of blood was collected.  Insufficient quantity may yield false negative results.    S. Pneumo Ag Urine or CSF - Urine, Urine, Catheter In/Out [[id number]]  (Normal) Collected: 07/06/25 1811    Lab Status: Final result Specimen: Urine, Catheter In/Out Updated: 07/06/25 1849     Strep Pneumo Ag Negative    Legionella Antigen, Urine - Urine, Urine, Catheter In/Out [[id number]]  (Normal) Collected: 07/06/25 1811    Lab Status: Final result Specimen: Urine, Catheter In/Out Updated: 07/06/25 1849     LEGIONELLA ANTIGEN, URINE Negative    Urine Culture - Urine, Urine, Clean Catch [[id number]]  (Normal) Collected: 07/06/25 1811    Lab Status: Final result Specimen: Urine, Clean Catch Updated: 07/08/25 0644     Urine Culture No growth          Imaging Results (Last 72 Hours)       Procedure Component Value Units Date/Time    CT Outside Abd/Pelvis [[id number]] Resulted: 07/07/25 0944     Updated: 07/07/25 0944    Narrative:      This procedure was auto-finalized with no dictation required.    CT Head Without Contrast [[id number]] Collected: 07/06/25 1830     Updated: 07/06/25 1837    Narrative:      CT HEAD WO CONTRAST    Date of Exam: 7/6/2025 6:23 PM EDT    Indication: acute encephalopathy.    Comparison: None available.    Technique: Axial CT images were obtained of the head without contrast administration.  Coronal reconstructions were performed.  Automated exposure control and iterative reconstruction methods were used.        FINDINGS:    Brain/Ventricles: There is no acute intracranial hemorrhage, mass effect or midline shift. No abnormal extra-axial fluid collection.  The gray-white differentiation is maintained without evidence of an acute infarct.  There is  no evidence of   hydrocephalus    Orbits: The visualized portion of the orbits demonstrate no acute abnormality.    Incidental note of the divergent gaze    Sinuses:The visualized paranasal sinuses and mastoid air cells demonstrate no acute abnormality.    Soft Tissues/Skull: No acute abnormality of the visualized skull or soft tissues.      Impression:      No acute intracranial abnormality.      Electronically Signed: Rashaad Joel MD    7/6/2025 6:35 PM EDT    Workstation ID: [id number]    XR Chest 1 View [[id number]] Collected: 07/06/25 1822     Updated: 07/06/25 1825    Narrative:      XR CHEST 1 VW    Date of Exam: 7/6/2025 6:00 PM EDT    Indication: Leukocytosis    Comparison: None available.    Findings:  The right hemidiaphragm is slightly elevated. There are bandlike right perihilar and medial basilar densities representing scarring versus subsegmental atelectasis. The remaining lungs and pleural spaces are clear. The heart size is normal. The pulmonary   vascular markings are normal. There is mild degenerative spondylosis in the thoracic spine.      Impression:      Impression:  1.Slightly elevated right hemidiaphragm.  2.Bandlike right perihilar and medial basilar scarring versus subsegmental atelectasis.        Electronically Signed: Roderick Melton MD    7/6/2025 6:23 PM EDT    Workstation ID: [id number]    US Liver [[id number]] Collected: 07/06/25 1624     Updated: 07/06/25 1627    Narrative:      US LIVER    Date of Exam: 7/6/2025 3:06 PM EDT    Indication: elevated LFT's, check for cirrhosis, ascites, HCC.    Comparison: No comparisons available.    Technique: Grayscale and color Doppler ultrasound evaluation of the liver was performed.      Findings:  Diffuse increased hepatic echogenicity. Fairly homogeneous hepatic echotexture. Liver is enlarged measuring up to 20 cm in length. No solid appearing mass. No visualized ascites. Hepatopetal flow of main portal vein. No intrahepatic duct dilation. Common    bile duct measures up to 4 mm normal for age.      Impression:      Impression:  1. Hepatomegaly with likely mild to moderate severity hepatic steatosis.  2. No sonographic evidence of hepatocellular carcinoma.  3. No visualized ascites. Hepatopetal flow of main portal vein.        Electronically Signed: Trevor Marks MD    7/6/2025 4:25 PM EDT    Workstation ID: [id number]            Assessment & Plan       Patient admitted into the hospital 7/6/2025.  GI was consulted for elevated bilirubin.  History of alcohol abuse with withdrawal.     ASSESSMENT:  Alcohol hepatitis  Elevated LFTs  Alcohol withdrawal  Electrolyte abnormalities  Thrombocytopenia        PLAN:  -Aurelia discriminant function 38 survival.  Patient started on prednisolone for alcoholic hepatitis 7/6/2025.  Calculate Lille score on day 7 (7/12/2025) or consider calculating early on day 4 (7/9/2025).  - Acute hepatitis panel negative.  - Hemoglobin 12.5 from 12.1.  No overt GI bleeding noted.  Continue to monitor H&H and transfuse as needed for hemoglobin less than 7.  - LFTs fluctuating.  AST/ALT ratio consistent with alcohol hepatitis.  Alk phos 193 from 174 ,  from 135, ALT 71 from 56, total bilirubin 8.1 from 12.6.  - RUQ US-hepatomegaly with likely mild to moderate severe  -Continue CIWA protocol.  -Continue regular/high-protein diet as tolerated  -Continue thiamine, folic acid and zinc due to alcohol abuse.   -Continue lactulose and Xifaxan.  -Encouraged nutrition with high-protein diet.  - Supportive care.    Electronically signed by RYAN Camejo, 07/08/25, 9:50 AM EDT.

## 2025-07-08 NOTE — PROGRESS NOTES
"  Chief complaint Pt was unable to voice pertinent complaint secondary to confusion     Subjective .     History of present illness:  The patient is a 35 y.o. male who was admitted secondary to history of alcohol use and acute alcohol withdrawal      Psychiatry consulted secondary to alcohol withdrawal.   Pt is pleasantly confused- Oriented to self, believes he is in [facility].   not agitated -Not aggressive- no threats.  Reports daily alcohol use -drinking 10-15 beers per day.The pt is a limited historian secondary to confusion   RN reported CIWA with phenobarbital and BZD - reported as effective for managing sxs of intermittent agitation and confusion. No threatening behaviors- pt no longer requiring restraints for agitation       Today the pt remains confused- delayed   pt was not agitated or aggressive at the time of assessment .   RN reported the pt is now on precedex gtt prn  - no events today       The following portions of the patient's history were reviewed and updated as appropriate: allergies, current medications, past family history, past medical history, past social history, past surgical history and problem list.    History    Objective     Vital Signs   /74   Pulse 113   Temp 98 °F (36.7 °C) (Oral)   Resp (P) 13   Ht 165.1 cm (65\")   Wt 79.8 kg (175 lb 14.8 oz)   SpO2 100%   BMI 29.28 kg/m²       Mental Status Exam:   Hygiene:   fair  Cooperation:  attempting to cooperate   Eye Contact:  Good  Psychomotor Behavior:  Slow  Affect:  Restricted  Mood: CITLALI  Speech:  Minimal  Thought Process:  delayed , confused   Thought Content:  Unable to demonstrate  Suicidal:  not expressed   Homicidal:  not expressed   Hallucinations:  Not demonstrated today  Delusion:  Unable to demonstrate  Memory:  Deficits  Orientation:  Person and    Reliability:  poor  Insight:  Poor  Judgement:  Impaired  Impulse Control:  limited             Assessment & Plan       Alcohol withdrawal delirium   "     Assessment: Alcohol dependence, and withdrawal   Treatment Plan: The pt presents with alcohol dependence and withdrawal.   Continue CIWA with phenobarbital and BZD per hospitalist- the pt is on precedex gtt prn .  No indication for additional medications at this time   The pt is confused and unable to participate in discussion of alcohol cessation  -SW following can assist with resources when able to participate    Will follow prn   Treatment Plan discussed with: Patient    I discussed the patients findings and my recommendations with patient and nursing staff    I have reviewed and approved the behavioral health treatment plans and problem list. Yes  Thank you for the consult   Referring MD has access to consult report and progress notes in EMR     Shaina Quiñones DNP, APRN  07/08/25  17:41 EDT

## 2025-07-08 NOTE — CASE MANAGEMENT/SOCIAL WORK
Continued Stay Note   [facility]     Patient Name: Colt Luciano  MRN: [id number]  Today's Date: 7/8/2025    Admit Date: 7/6/2025    Plan: Plan to return to apartment with 2 co-worker friends.   Discharge Plan       Row Name 07/08/25 0954       Plan    Plan Plan to return to apartment with 2 co-worker friends.    Plan Comments DC Barriers: PT/OT eval pending, CIWA, Lactulose, Xifaxan, monitor LFTS/labs, Psych/GI following.               Expected Discharge Date and Time       Expected Discharge Date Expected Discharge Time    Jul 11, 2025               KHALIF Webster RN  ICU/CVU   O: [phone]  C: [phone]  [email or web address]

## 2025-07-08 NOTE — ACP (ADVANCE CARE PLANNING)
Social Work Assessment  [facility]     Patient Name: Colt Luciano  MRN: [id number]  Today's Date: 7/8/2025    Admit Date: 7/6/2025     Demographic Summary       Row Name 07/08/25 1551       General Information    Reason for Consult notify/locate next of kin       Contact Information    Contact Information Comments SW was consulted to locate NOK. SW called pt's friend Joseph with assistance of telephonic  and LVMs @1026 and 1415. SW also LVM for pt's boss Beau @1027; however, per nurse note dated 07.06.2025, pt's boss has already informed staff that he is unaware of any of pt's kin. Unless/until kinship of a higher priority level come forward according to the Healthcare Consent Hierarchy (HB 1119), pt's friends fall 9th in line as being able to make decisions.             JONATHON Richardson, W  Medical Social Worker  Ph [phone]  Fax [phone]  [email or web address]

## 2025-07-09 LAB
ALBUMIN SERPL-MCNC: 3.1 G/DL (ref 3.5–5.2)
ALBUMIN/GLOB SERPL: 1 G/DL
ALP SERPL-CCNC: 176 U/L (ref 39–117)
ALT SERPL W P-5'-P-CCNC: 75 U/L (ref 1–41)
ANION GAP SERPL CALCULATED.3IONS-SCNC: 12.4 MMOL/L (ref 5–15)
AST SERPL-CCNC: 99 U/L (ref 1–40)
BASOPHILS # BLD AUTO: 0.04 10*3/MM3 (ref 0–0.2)
BASOPHILS NFR BLD AUTO: 0.5 % (ref 0–1.5)
BILIRUB SERPL-MCNC: 6.7 MG/DL (ref 0–1.2)
BUN SERPL-MCNC: 13.7 MG/DL (ref 6–20)
BUN/CREAT SERPL: 23.2 (ref 7–25)
CALCIUM SPEC-SCNC: 8.4 MG/DL (ref 8.6–10.5)
CHLORIDE SERPL-SCNC: 107 MMOL/L (ref 98–107)
CO2 SERPL-SCNC: 17.6 MMOL/L (ref 22–29)
CREAT SERPL-MCNC: 0.59 MG/DL (ref 0.76–1.27)
DEPRECATED RDW RBC AUTO: 57.7 FL (ref 37–54)
EGFRCR SERPLBLD CKD-EPI 2021: 131.4 ML/MIN/1.73
EOSINOPHIL # BLD AUTO: 0.13 10*3/MM3 (ref 0–0.4)
EOSINOPHIL NFR BLD AUTO: 1.5 % (ref 0.3–6.2)
ERYTHROCYTE [DISTWIDTH] IN BLOOD BY AUTOMATED COUNT: 16.7 % (ref 12.3–15.4)
GLOBULIN UR ELPH-MCNC: 3.1 GM/DL
GLUCOSE BLDC GLUCOMTR-MCNC: 122 MG/DL (ref 70–105)
GLUCOSE BLDC GLUCOMTR-MCNC: 138 MG/DL (ref 70–105)
GLUCOSE BLDC GLUCOMTR-MCNC: 87 MG/DL (ref 70–105)
GLUCOSE SERPL-MCNC: 89 MG/DL (ref 65–99)
HCT VFR BLD AUTO: 34.8 % (ref 37.5–51)
HGB BLD-MCNC: 12 G/DL (ref 13–17.7)
IMM GRANULOCYTES # BLD AUTO: 0.06 10*3/MM3 (ref 0–0.05)
IMM GRANULOCYTES NFR BLD AUTO: 0.7 % (ref 0–0.5)
INR PPP: 1.18 (ref 0.9–1.1)
LYMPHOCYTES # BLD AUTO: 1.61 10*3/MM3 (ref 0.7–3.1)
LYMPHOCYTES NFR BLD AUTO: 18.9 % (ref 19.6–45.3)
MAGNESIUM SERPL-MCNC: 1.7 MG/DL (ref 1.6–2.6)
MCH RBC QN AUTO: 32.8 PG (ref 26.6–33)
MCHC RBC AUTO-ENTMCNC: 34.5 G/DL (ref 31.5–35.7)
MCV RBC AUTO: 95.1 FL (ref 79–97)
MONOCYTES # BLD AUTO: 0.8 10*3/MM3 (ref 0.1–0.9)
MONOCYTES NFR BLD AUTO: 9.4 % (ref 5–12)
NEUTROPHILS NFR BLD AUTO: 5.9 10*3/MM3 (ref 1.7–7)
NEUTROPHILS NFR BLD AUTO: 69 % (ref 42.7–76)
NRBC BLD AUTO-RTO: 0 /100 WBC (ref 0–0.2)
PHOSPHATE SERPL-MCNC: 1.9 MG/DL (ref 2.5–4.5)
PHOSPHATE SERPL-MCNC: 2.2 MG/DL (ref 2.5–4.5)
PHOSPHATE SERPL-MCNC: 3 MG/DL (ref 2.5–4.5)
PLATELET # BLD AUTO: 126 10*3/MM3 (ref 140–450)
PMV BLD AUTO: 11.1 FL (ref 6–12)
POTASSIUM SERPL-SCNC: 3.2 MMOL/L (ref 3.5–5.2)
POTASSIUM SERPL-SCNC: 3.9 MMOL/L (ref 3.5–5.2)
PROT SERPL-MCNC: 6.2 G/DL (ref 6–8.5)
PROTHROMBIN TIME: 15 SECONDS (ref 11.7–14.2)
RBC # BLD AUTO: 3.66 10*6/MM3 (ref 4.14–5.8)
SODIUM SERPL-SCNC: 137 MMOL/L (ref 136–145)
WBC NRBC COR # BLD AUTO: 8.54 10*3/MM3 (ref 3.4–10.8)

## 2025-07-09 PROCEDURE — 97165 OT EVAL LOW COMPLEX 30 MIN: CPT

## 2025-07-09 PROCEDURE — 82948 REAGENT STRIP/BLOOD GLUCOSE: CPT

## 2025-07-09 PROCEDURE — 25010000002 THIAMINE PER 100 MG: Performed by: NURSE PRACTITIONER

## 2025-07-09 PROCEDURE — 83735 ASSAY OF MAGNESIUM: CPT | Performed by: NURSE PRACTITIONER

## 2025-07-09 PROCEDURE — 85610 PROTHROMBIN TIME: CPT | Performed by: NURSE PRACTITIONER

## 2025-07-09 PROCEDURE — 80053 COMPREHEN METABOLIC PANEL: CPT | Performed by: NURSE PRACTITIONER

## 2025-07-09 PROCEDURE — 84100 ASSAY OF PHOSPHORUS: CPT | Performed by: EMERGENCY MEDICINE

## 2025-07-09 PROCEDURE — 84132 ASSAY OF SERUM POTASSIUM: CPT | Performed by: EMERGENCY MEDICINE

## 2025-07-09 PROCEDURE — 85025 COMPLETE CBC W/AUTO DIFF WBC: CPT | Performed by: NURSE PRACTITIONER

## 2025-07-09 PROCEDURE — 25010000002 FOLIC ACID 5 MG/ML SOLUTION 10 ML VIAL: Performed by: NURSE PRACTITIONER

## 2025-07-09 PROCEDURE — 25010000002 ENOXAPARIN PER 10 MG: Performed by: EMERGENCY MEDICINE

## 2025-07-09 PROCEDURE — 97162 PT EVAL MOD COMPLEX 30 MIN: CPT

## 2025-07-09 PROCEDURE — 63710000001 PREDNISOLONE PER 5 MG: Performed by: NURSE PRACTITIONER

## 2025-07-09 RX ORDER — POTASSIUM CHLORIDE 1500 MG/1
40 TABLET, EXTENDED RELEASE ORAL EVERY 4 HOURS
Status: COMPLETED | OUTPATIENT
Start: 2025-07-09 | End: 2025-07-09

## 2025-07-09 RX ADMIN — RIFAXIMIN 550 MG: 550 TABLET ORAL at 09:08

## 2025-07-09 RX ADMIN — THIAMINE HYDROCHLORIDE 500 MG: 100 INJECTION, SOLUTION INTRAMUSCULAR; INTRAVENOUS at 05:14

## 2025-07-09 RX ADMIN — FAMOTIDINE 20 MG: 20 TABLET, FILM COATED ORAL at 07:52

## 2025-07-09 RX ADMIN — PHENOBARBITAL 32.4 MG: 32.4 TABLET ORAL at 05:13

## 2025-07-09 RX ADMIN — THIAMINE HYDROCHLORIDE 200 MG: 100 INJECTION, SOLUTION INTRAMUSCULAR; INTRAVENOUS at 13:43

## 2025-07-09 RX ADMIN — LACTULOSE SOLUTION USP, 10 G/15 ML 30 G: 10 SOLUTION ORAL; RECTAL at 21:27

## 2025-07-09 RX ADMIN — Medication 2 PACKET: at 10:16

## 2025-07-09 RX ADMIN — Medication 2 PACKET: at 04:21

## 2025-07-09 RX ADMIN — POTASSIUM CHLORIDE 40 MEQ: 1500 TABLET, EXTENDED RELEASE ORAL at 10:54

## 2025-07-09 RX ADMIN — LACTULOSE SOLUTION USP, 10 G/15 ML 30 G: 10 SOLUTION ORAL; RECTAL at 16:32

## 2025-07-09 RX ADMIN — Medication 10 ML: at 09:09

## 2025-07-09 RX ADMIN — RIFAXIMIN 550 MG: 550 TABLET ORAL at 21:27

## 2025-07-09 RX ADMIN — THIAMINE HYDROCHLORIDE 200 MG: 100 INJECTION, SOLUTION INTRAMUSCULAR; INTRAVENOUS at 21:27

## 2025-07-09 RX ADMIN — Medication 1 TABLET: at 09:08

## 2025-07-09 RX ADMIN — FOLIC ACID 1 MG: 5 INJECTION, SOLUTION INTRAMUSCULAR; INTRAVENOUS; SUBCUTANEOUS at 09:08

## 2025-07-09 RX ADMIN — LORAZEPAM 1 MG: 1 TABLET ORAL at 21:48

## 2025-07-09 RX ADMIN — Medication 220 MG: at 09:08

## 2025-07-09 RX ADMIN — LORAZEPAM 2 MG: 1 TABLET ORAL at 10:16

## 2025-07-09 RX ADMIN — PREDNISOLONE SODIUM PHOSPHATE 40 MG: 15 SOLUTION ORAL at 09:07

## 2025-07-09 RX ADMIN — FAMOTIDINE 20 MG: 20 TABLET, FILM COATED ORAL at 16:32

## 2025-07-09 RX ADMIN — LACTULOSE SOLUTION USP, 10 G/15 ML 30 G: 10 SOLUTION ORAL; RECTAL at 09:05

## 2025-07-09 RX ADMIN — POTASSIUM CHLORIDE 40 MEQ: 1500 TABLET, EXTENDED RELEASE ORAL at 07:52

## 2025-07-09 RX ADMIN — Medication 10 ML: at 21:48

## 2025-07-09 RX ADMIN — ENOXAPARIN SODIUM 40 MG: 100 INJECTION SUBCUTANEOUS at 16:32

## 2025-07-09 NOTE — ACP (ADVANCE CARE PLANNING)
Social Work Assessment  [facility]     Patient Name: Colt Luciano  MRN: [id number]  Today's Date: 7/9/2025    Admit Date: 7/6/2025     Demographic Summary       Row Name 07/09/25 1359       General Information    Reason for Consult health care directive      General Information Comments SW met with pt to f/u re: HCR/NOK in room 2308. Telephonic , S[place] (#[id number]) assisted with conversation. Patient alert and oriented, appropriate throughout interaction. Pt has no AD's on file and not . Pt affirmed. Pt reports his parents, Niranjan Montano and Vin Luciano reside in [place]. He has a 23 y.o. brother who resides in the [place], but pt's phone is dead, port is damaged/needs wireless , so unable to provide phone numbers for any kin. Pt opted to appoint his friend/roommate, Guanako, as primary HCR. SW spoke with Guanako over the phone with assist from  and he is in agreement to be HCR. SW also attempted to discuss with pt's boss but no answer, LVM, as pt appointed him as back-up HCR. Pt also agreeable to his brother being a back-up HCR, but number for his brother is not yet available. At the time this writer left pt's room, pt was waiting on roommate to bring a phone .             JONATHON Richardson, [facility]  Medical Social Worker  Ph [phone]  Fax [phone]  [email or web address]

## 2025-07-09 NOTE — CASE MANAGEMENT/SOCIAL WORK
Continued Stay Note  [facility]     Patient Name: Colt Luciano  MRN: [id number]  Today's Date: 7/9/2025    Admit Date: 7/6/2025    Plan: Plan to return to apartment with 2 co-worker friends, no verfiied insurance at this time, [employer] following.   Discharge Plan       Row Name 07/09/25 1339       Plan    Plan Plan to return to apartment with 2 co-worker friends, no verfiied insurance at this time, [employer] following.    Plan Comments DC Barriers: PT/OT eval ongoing - no verifiable insurance for SNF-hoping to progress to home, CIWA, Lactulose, Xifaxan, monitor LFTS/labs, Psych/GI following.               Expected Discharge Date and Time       Expected Discharge Date Expected Discharge Time    Jul 11, 2025               KHALIF Webster RN  ICU/CVU   O: [phone]  C: [phone]  [email or web address]

## 2025-07-09 NOTE — PAYOR COMM NOTE
"This is a clinical update for Colt Luciano  Reference/Auth # [id number]     EXTENDED AUTHORIZATION PENDING:     Please call or fax determination to contact below.   Thank you.      Yojana Pizano RN, CCM  Utilization Review  [facility]    Phone: [phone]  Fax: [phone]  --------------------------------  NPI: [id number]  Tax ID: [id number]      Colt Luciano (35 y.o. Male)       Date of Birth   1990    Social Security Number       Address   [address] [place] [address]    Home Phone   [phone]    MRN   [id number]       Jewish   None    Marital Status   Single                            Admission Date   7/6/2025    Admission Type   Urgent    Admitting Provider   Bryan De La Garza DO    Attending Provider   Tung Fernandez MD    Department, Room/Bed   [facility] INTENSIVE CARE UNIT, 2308/1       Discharge Date       Discharge Disposition       Discharge Destination                                 Attending Provider: Tung Fernandez MD    Allergies: No Known Allergies    Isolation: None   Infection: None   Code Status: CPR    Ht: 165.1 cm (65\")   Wt: 79.8 kg (175 lb 14.8 oz)    Admission Cmt: None   Principal Problem: Alcohol withdrawal delirium [F10.931]                   Active Insurance as of 7/6/2025       Primary Coverage       Payor Plan Insurance Group Employer/Plan Group    [place]        Payor Plan Address Payor Plan Phone Number Payor Plan Fax Number Effective Dates    [address] [address]   1/1/2025 - None Entered    [place] [address]         Subscriber Name Subscriber Birth Date Member ID       COLT LUCIANO 1990 [id number]                     Emergency Contacts        (Rel.) Home Phone Work Phone Mobile Phone    SANDRO MOLINA (Friend) -- -- [phone]    Carmen Sales (Friend) -- -- --    Beau Marion (Friend) -- -- [phone]                 Physician Progress Notes (last 72 hours)    "     Jason Carpio MD at 25 1158              [facility] SERVICE  TRANSFER OF CARE/ACCEPTANCE NOTE    PATIENT NAME: Colt Luciano  : 1990  MRN: [id number]            [facility] SERVICE  TRANSFER OF CARE/ACCEPTANCE NOTE    PATIENT NAME: Colt Luciano  : 1990  MRN: [id number]     Active Hospital Problems    Diagnosis  POA    **Alcohol withdrawal delirium [F10.931]  Yes      Resolved Hospital Problems   No resolved problems to display.       Patient seen and examined by me on 25 at 10 am.    Interim History:Colt Luciano is a 35 y.o. male with PMH of alcohol abuse disorder presented to the hospital of an [facility], and was admitted with a principal diagnosis of Alcohol withdrawal delirium.  The following information has been obtained primarily from review of documentation and collaboration with the hospitalist as the patient is confused at the time of admission to the ICU.  The patient reportedly presented to the ED of an [facility] facility overnight for evaluation of increasing tremors, anxiousness, and abdominal pain.  The patient reported that he usually drinks 8-12 beers per day however quit drinking about 4 days ago.  Friends and his boss noted that he was having increased shaking and brought him to the ED.  Findings in the ED ED were consistent with acute kidney injury and acute liver injury.  CT of the abdomen revealed hepatomegaly with steatosis and nodular changes consistent with evolving cirrhosis.  Transfer to [facility] was requested for higher level medical management as well as subspecialty availability.  The patient was initially excepted by the hospitalist for admission to the floor however the patient quickly developed fulminant alcohol withdrawal requiring large doses of Valium and physical restraints for his safety.  The patient was then transferred to the ICU for aggressive medical management and Precedex infusion to  facilitate patient care.  The patient's is primarily [place]-speaking and provided some information via electronic    Patient reports feeling better but still with unsteady gait    I have noted the following changes since admission: continued unsteady gait and patient with no insurance hence difficult SNF placement.    I have reviewed the H&P, diagnostic data and plan of care for Colt Luciano.  I will be taking over care of this patient during the current hospitalization.        Signature: Electronically signed by Jason Carpio MD, 25, 11:58 EDT.  [facility] Hospitalist Team           Electronically signed by Jason Carpio MD at 25 1200       Tung Fernandez MD at 25 1117            Critical Care Progress Note     Colt Luciano : 1990 MRN:[id number] LOS:3  Rm: 2308/1     Principal Problem: Alcohol withdrawal delirium     Reason for follow up: All the medical problems listed below    Summary     Colt Luciano is a 35 y.o. male with PMH of alcohol abuse disorder [facility], and was admitted with a principal diagnosis of Alcohol withdrawal delirium.  The following information has been obtained primarily from review of documentation and collaboration with the hospitalist as the patient is confused at the time of admission to the ICU.  The [facility] evaluation of increasing tremors, anxiousness, and abdominal pain.  The patient reported that he usually drinks 8-12 beers per day however quit drinking about 4 days ago.  Friends and his boss noted that he was having increased shaking and brought him to the ED.  Findings in the ED ED were consistent with acute kidney injury and acute liver injury.  CT of the abdomen revealed hepatomegaly with steatosis and nodular changes consistent with evolving cirrhosis.  Transfer to [facility] was requested for higher  level medical management as well as subspecialty availability.  The patient was initially excepted by the hospitalist for admission to the floor however the patient quickly developed fulminant alcohol withdrawal requiring large doses of Valium and physical restraints for his safety.  The patient was then transferred to the ICU for aggressive medical management and Precedex infusion to facilitate patient care.  The patient's is primarily Burkinan-speaking and provided some information via electronic         Of note, this patient is marked for a merged chart.  The alternate name is Colt Montano medical record #[id number]       7/7:The patient is awake and communicative today, able to communicate in English for examination. He remains confused and impulsive however redirects easily. Sitter at bedside for patient safety. Started a diet which is tolerated well.  Hold further antibiotics for now, infectious workup benign at this point.  He is stable for transfer out of the ICU.    7/8:Patient was reupgraded to the ICU because he was combative and aggressive. Given geodon as well as a significant amount of versed and subsequently required precedex. Was in 4 point restraints for violence. This AM the precedex was weaned off. The patient is comfortable, calm, and conversant. Vital signs are stable. No restraints.    Patient is on Hospital Day: 4.    Significant Events / Subjective     07/09/25 : Did not need precedex this AM, is doing well and awake with no complaints. Still with significant jaundice. GI recommendation for 4 weeks of prednisolone. Patient stable for downgrade out of ICU    Assessment / Plan     Acute toxic / metabolic encephalopathy  Hospital acquired delirium  Likely secondary to acute alcohol withdrawal syndrome  EtOH level <10  Urine tox screen positive for barbiturates and benzodiazapine which are consistent with medications administered since admission. Remaining drugs of abuse are negative    Ammonia level 37  CT head negative for acute intracranial findings  Phenobarbital per [facility] protocol   Supportive care for now.   Delirium precautions     Alcohol abuse syndrome  Acute alcohol withdrawal syndrome  Monitor with CIWA protocol  Started phenobarbital taper per EtOH withdrawal protocol     Acute liver injury on chronic, alcohol induced liver disease  Hyperammonemia  Liver ultrasound reviewed: Hepatomegaly with likely mild to moderate severity hepatic steatosis, no visualized ascites, hepatopetal flow with main portal vein  INR 1.36  Bilirubin improving  Acute hepatitis panel nonreactive  GI following  On prednisolone for 4 weeks per GI  Continue rifaximin & lactulose  Ammonia this AM was 93       Disposition:  PCU    Code status:   Code Status (Patient has no pulse and is not breathing): CPR (Attempt to Resuscitate)  Medical Interventions (Patient has pulse or is breathing): Full Support  Level Of Support Discussed With: Patient       Nutrition:   Diet: High Protein Diet; Fluid Consistency: Thin (IDDSI 0)   Patient isn't on Tube Feeding     VTE Prophylaxis:  Pharmacologic & mechanical VTE prophylaxis orders are present.           Objective / Physical Exam     Vital signs:  Temp: 99.7 °F (37.6 °C)  BP: 127/74  Heart Rate: 105  Resp: 25  SpO2: 99 %  Weight: 79.8 kg (175 lb 14.8 oz)    Admission Weight: Weight: 83.1 kg (183 lb 3.2 oz)  Current Weight: Weight: 79.8 kg (175 lb 14.8 oz)    Input/Output in last 24 hours:    Intake/Output Summary (Last 24 hours) at 7/9/2025 1120  Last data filed at 7/9/2025 0840  Gross per 24 hour   Intake 600 ml   Output 900 ml   Net -300 ml      Net IO Since Admission: 1,022 mL [07/09/25 1120]     Physical Exam  Vitals and nursing note reviewed.   Constitutional:       General: He is not in acute distress.     Appearance: He is not ill-appearing.      Comments: Sitting up in bed in chair drinking soda   HENT:      Head: Normocephalic and atraumatic.      Right Ear: External  ear normal.      Left Ear: External ear normal.   Eyes:      General: Scleral icterus present.      Conjunctiva/sclera: Conjunctivae normal.   Neck:      Comments: Trachea midline  No JVD  Cardiovascular:      Rate and Rhythm: Regular rhythm. Tachycardia present.      Heart sounds: Normal heart sounds, S1 normal and S2 normal. No murmur heard.  Pulmonary:      Breath sounds: Normal breath sounds. No wheezing or rhonchi.   Abdominal:      General: There is distension.      Tenderness: There is no abdominal tenderness.   Musculoskeletal:      Right lower leg: No edema.      Left lower leg: No edema.   Skin:     General: Skin is warm and dry.      Coloration: Skin is jaundiced.   Neurological:      General: No focal deficit present.      Mental Status: He is alert.      Sensory: No sensory deficit.      Motor: No weakness.          Radiology and Labs     Results from last 7 days   Lab Units 07/09/25  0510 07/08/25  0509 07/07/25  0522 07/06/25  0620   WBC 10*3/mm3 8.54 9.47 9.46 12.54*   HEMOGLOBIN g/dL 12.0* 12.5* 12.1* 13.2   HEMATOCRIT % 34.8* 36.9* 35.6* 38.2   PLATELETS 10*3/mm3 126* 112* 75* 66*      Results from last 7 days   Lab Units 07/09/25  0510 07/08/25  0509 07/07/25  0522 07/06/25  0620   PROTIME Seconds 15.0* 15.2* 15.7* 16.8*   INR  1.18* 1.20* 1.25* 1.36*      Results from last 7 days   Lab Units 07/09/25  0510 07/09/25  0210 07/08/25  1749 07/08/25  0509 07/07/25  0522 07/06/25  1936 07/06/25  1404 07/06/25  0620   SODIUM mmol/L 137  --   --  143 138  --  133* 134*   POTASSIUM mmol/L 3.2*  --  3.9 3.4* 3.6 3.0* 3.0* 3.3*   CHLORIDE mmol/L 107  --   --  114* 107  --  99 97*   CO2 mmol/L 17.6*  --   --  18.2* 18.3*  --  19.7* 18.5*   ANION GAP mmol/L 12.4  --   --  10.8 12.7  --  14.3 18.5*   BUN mg/dL 13.7  --   --  23.2* 28.3*  --  23.0* 23.6*   CREATININE mg/dL 0.59*  --   --  0.69* 0.93  --  1.10 1.66*   GLUCOSE mg/dL 89  --   --  137* 84  --  85 90   PHOSPHORUS mg/dL 2.2* 1.9* 1.4* 2.0*  --   --   2.5  --    MAGNESIUM mg/dL 1.7  --   --  2.4 2.6  --  2.3 2.0   ALT (SGPT) U/L 75*  --   --  71* 56*  --   --  47*   AST (SGOT) U/L 99*  --   --  122* 135*  --   --  121*   ALK PHOS U/L 176*  --   --  193* 174*  --   --  213*      Results from last 7 days   Lab Units 07/09/25  0510 07/08/25  0509 07/07/25  0522 07/06/25  0620   ALT (SGPT) U/L 75* 71* 56* 47*   AST (SGOT) U/L 99* 122* 135* 121*   ALK PHOS U/L 176* 193* 174* 213*           No radiology results for the last day      Current medications     Scheduled Meds:   enoxaparin sodium, 40 mg, Subcutaneous, Q24H  famotidine, 20 mg, Oral, BID AC  folic acid 1 mg in sodium chloride 0.9 % 50 mL IVPB, 1 mg, Intravenous, Daily  lactulose, 30 g, Oral, TID  multivitamin with minerals, 1 tablet, Oral, Daily  PHENobarbital, 32.4 mg, Oral, Once  prednisoLONE sodium phosphate, 40 mg, Oral, Daily  rifAXIMin, 550 mg, Oral, Q12H  sodium chloride, 10 mL, Intravenous, Q12H  thiamine (B-1) IV, 200 mg, Intravenous, Q8H   Followed by  [START ON 7/13/2025] thiamine, 100 mg, Oral, Daily  zinc sulfate, 220 mg, Oral, Daily        Continuous Infusions:          Plan discussed with RN. Reviewed all other data in the last 24 hours, including but not limited to vitals, labs, microbiology, imaging and pertinent notes from other providers.      Tung Fernandez MD   Critical Care  07/09/25   11:20 EDT     Electronically signed by Tung Fernandez MD at 07/09/25 1120       Adrienne Thomas APRN at 07/09/25 0920           LOS: 3 days   Patient Care Team:  Joyce Shannon APRN as PCP - General (Nurse Practitioner)      Subjective     Interval History:     Subjective: Patient has been weaned off Precedex and received x 1 dose of Ativan 1 mg overnight.  Patient is awake and oriented x 4 today.  Patient denies abdominal pain, nausea, or vomiting.  Tolerating regular diet.  Multiple brown liquid BMs noted overnight.      ROS:   No chest pain, shortness of breath, or cough.        Medication Review:      Current Facility-Administered Medications:     aluminum-magnesium hydroxide-simethicone (MAALOX MAX) 400-400-40 MG/5ML suspension 15 mL, 15 mL, Oral, Q6H PRN, Ann Kearns APRN    sennosides-docusate (PERICOLACE) 8.6-50 MG per tablet 2 tablet, 2 tablet, Oral, BID PRN **AND** polyethylene glycol (MIRALAX) packet 17 g, 17 g, Oral, Daily PRN **AND** bisacodyl (DULCOLAX) EC tablet 5 mg, 5 mg, Oral, Daily PRN **AND** bisacodyl (DULCOLAX) suppository 10 mg, 10 mg, Rectal, Daily PRN, Ann Kearns APRN    Calcium Replacement - Follow Nurse / BPA Driven Protocol, , Not Applicable, PRN, Bryan De La Garza DO    enoxaparin sodium (LOVENOX) syringe 40 mg, 40 mg, Subcutaneous, Q24H, Tung Fernandez MD, 40 mg at 07/08/25 1530    famotidine (PEPCID) tablet 20 mg, 20 mg, Oral, BID AC, Jason Carpio MD, 20 mg at 07/09/25 0752    folic acid 1 mg in sodium chloride 0.9 % 50 mL IVPB, 1 mg, Intravenous, Daily, Day, Lucila ERASMO, APRN, Last Rate: 100 mL/hr at 07/09/25 0908, 1 mg at 07/09/25 0908    HYDROmorphone (DILAUDID) injection 0.5 mg, 0.5 mg, Intravenous, Q2H PRN, Angelique López APRN, 0.5 mg at 07/07/25 0255    lactulose (CHRONULAC) 10 GM/15ML solution 30 g, 30 g, Oral, TID, Caitlin Alvarado APRN, 30 g at 07/09/25 0905    loperamide (IMODIUM) capsule 2 mg, 2 mg, Oral, 4x Daily PRN, Susan Wilhelm PA-C    LORazepam (ATIVAN) tablet 1 mg, 1 mg, Oral, Q1H PRN, 1 mg at 07/08/25 2207 **OR** midazolam (VERSED) injection 2 mg, 2 mg, Intravenous, Q1H PRN **OR** LORazepam (ATIVAN) tablet 2 mg, 2 mg, Oral, Q1H PRN, 2 mg at 07/07/25 2027 **OR** midazolam (VERSED) injection 4 mg, 4 mg, Intravenous, Q1H PRN, 4 mg at 07/07/25 2329 **OR** midazolam (VERSED) injection 4 mg, 4 mg, Intravenous, Q15 Min PRN, 4 mg at 07/08/25 0022 **OR** midazolam (VERSED) injection 4 mg, 4 mg, Intramuscular, Q15 Min PRN, Susan Wilhelm PA-C    Magnesium Standard Dose Replacement - Follow Nurse / BPA Driven Protocol, , Not  Applicable, PRN, Ann Kearns APRN    Magnesium Standard Dose Replacement - Follow Nurse / BPA Driven Protocol, , Not Applicable, Holli BOWERS Christopher L, DO    multivitamin with minerals 1 tablet, 1 tablet, Oral, Daily, Day, Lucila G, APRN, 1 tablet at 07/09/25 0908    nitroglycerin (NITROSTAT) SL tablet 0.4 mg, 0.4 mg, Sublingual, Q5 Min PRN, Bryan De La Garza DO    [COMPLETED] PHENobarbital injection 65 mg, 65 mg, Intravenous, Once, 65 mg at 07/07/25 0504 **FOLLOWED BY** [COMPLETED] PHENobarbital injection 65 mg, 65 mg, Intravenous, Once, 65 mg at 07/07/25 1857 **FOLLOWED BY** PHENobarbital tablet 32.4 mg, 32.4 mg, Oral, Once **FOLLOWED BY** [COMPLETED] PHENobarbital tablet 32.4 mg, 32.4 mg, Oral, Once, 32.4 mg at 07/08/25 1747 **FOLLOWED BY** [COMPLETED] PHENobarbital tablet 32.4 mg, 32.4 mg, Oral, Once, Day, Lucila G, APRN, 32.4 mg at 07/09/25 0513    Phosphorus Replacement - Follow Nurse / BPA Driven Protocol, , Not Applicable, PRN, Bryan De La Garza DO    potassium chloride (KLOR-CON M20) CR tablet 40 mEq, 40 mEq, Oral, Q4H, Tung Fernandez MD, 40 mEq at 07/09/25 0752    Potassium Replacement - Follow Nurse / BPA Driven Protocol, , Not Applicable, Holli BOWERS Christopher L, DO    prednisoLONE sodium phosphate (ORAPRED) 15 MG/5ML oral solution 40 mg, 40 mg, Oral, Daily, Caitlin Alvarado APRN, 40 mg at 07/09/25 0907    riFAXIMin (XIFAXAN) tablet 550 mg, 550 mg, Oral, Q12H, Caitlin Alvarado APRN, 550 mg at 07/09/25 0908    sodium chloride 0.9 % flush 10 mL, 10 mL, Intravenous, Q12H, Ann Kearns APRN, 10 mL at 07/09/25 0909    sodium chloride 0.9 % flush 10 mL, 10 mL, Intravenous, PRN, Achtershauna, Ann, APRN, 10 mL at 07/06/25 0808    sodium chloride 0.9 % infusion 40 mL, 40 mL, Intravenous, PRN, Som, Ann, APRN    [COMPLETED] thiamine (B-1) 500 mg in sodium chloride 0.9 % 100 mL IVPB, 500 mg, Intravenous, Q8H, Last Rate: 200 mL/hr at 07/09/25 0514, 500 mg at 07/09/25  0514 **FOLLOWED BY** thiamine (B-1) injection 200 mg, 200 mg, Intravenous, Q8H **FOLLOWED BY** [START ON 7/13/2025] thiamine (VITAMIN B-1) tablet 100 mg, 100 mg, Oral, Daily, Day, RYAN Jovel    zinc sulfate (ZINCATE) capsule 220 mg, 220 mg, Oral, Daily, Christiano CaitlinRYAN Albarran, 220 mg at 07/09/25 0908      Objective     Vital Signs  Vitals:    07/09/25 0500 07/09/25 0700 07/09/25 0800 07/09/25 0900   BP: 126/76 154/95 154/96 137/84   Pulse: 80 85 97 101   Resp:  25 25 27   Temp:   99.7 °F (37.6 °C)    TempSrc:   Oral    SpO2: 100% 100% 98% 100%   Weight:       Height:           Physical Exam:     General Appearance:    Awake and alert, in no acute distress, lying in bed   Head:    Normocephalic, without obvious abnormality   Eyes:          Conjunctivae normal, anicteric sclera       Neck:    no JVD   Lungs:     respirations regular, even and unlabored, room air   Abdomen:     Soft, non-tender, no rebound or guarding, non-distended       Extremities:   No edema, no cyanosis   Skin:   No bruising or rash, no jaundice        Results Review:    Results from last 7 days   Lab Units 07/09/25  0510 07/08/25  0509 07/07/25  0522 07/06/25  0620   WBC 10*3/mm3 8.54 9.47 9.46 12.54*   HEMOGLOBIN g/dL 12.0* 12.5* 12.1* 13.2   PLATELETS 10*3/mm3 126* 112* 75* 66*     Results from last 7 days   Lab Units 07/09/25  0510 07/09/25  0210 07/08/25  1749 07/08/25  1026 07/08/25  0509 07/07/25  0522 07/06/25  1936 07/06/25  1404 07/06/25  0620   SODIUM mmol/L 137  --   --   --  143 138  --  133* 134*   POTASSIUM mmol/L 3.2*  --  3.9  --  3.4* 3.6 3.0* 3.0* 3.3*   CHLORIDE mmol/L 107  --   --   --  114* 107  --  99 97*   CO2 mmol/L 17.6*  --   --   --  18.2* 18.3*  --  19.7* 18.5*   BUN mg/dL 13.7  --   --   --  23.2* 28.3*  --  23.0* 23.6*   CREATININE mg/dL 0.59*  --   --   --  0.69* 0.93  --  1.10 1.66*   GLUCOSE mg/dL 89  --   --   --  137* 84  --  85 90   ALBUMIN g/dL 3.1*  --   --   --  3.2* 3.1*  --   --  3.4*   BILIRUBIN mg/dL  "6.7*  --   --   --  8.1* 12.6*  --   --  14.5*   ALK PHOS U/L 176*  --   --   --  193* 174*  --   --  213*   AST (SGOT) U/L 99*  --   --   --  122* 135*  --   --  121*   ALT (SGPT) U/L 75*  --   --   --  71* 56*  --   --  47*   INR  1.18*  --   --   --  1.20* 1.25*  --   --  1.36*   MAGNESIUM mg/dL 1.7  --   --   --  2.4 2.6  --  2.3 2.0   PHOSPHORUS mg/dL 2.2* 1.9* 1.4*  --  2.0*  --   --  2.5  --    LIPASE U/L  --   --   --   --   --   --   --   --  39   AMMONIA umol/L  --   --   --  93*  --   --  37  --   --      Estimated Creatinine Clearance: 173.3 mL/min (A) (by C-G formula based on SCr of 0.59 mg/dL (L)).  No results found for: \"HGBA1C\"      Infection   Results from last 7 days   Lab Units 07/06/25 1944 07/06/25  1913 07/06/25  1811 07/06/25  1404   BLOODCX  No growth at 2 days No growth at 2 days  --   --    URINECX   --   --  No growth  --    PROCALCITONIN ng/mL  --   --   --  0.95*     UA    Results from last 7 days   Lab Units 07/06/25  1811   NITRITE UA  Positive*   WBC UA /HPF 6-10*   BACTERIA UA /HPF None Seen   SQUAM EPITHEL UA /HPF 3-6*   URINECX  No growth     Microbiology Results (last 10 days)       Procedure Component Value - Date/Time    Blood Culture - Blood, Arm, Left [[id number]]  (Normal) Collected: 07/06/25 1944    Lab Status: Preliminary result Specimen: Blood from Arm, Left Updated: 07/08/25 2000     Blood Culture No growth at 2 days    MRSA Screen, PCR (Inpatient) - Swab, Nares [[id number]]  (Normal) Collected: 07/06/25 1930    Lab Status: Final result Specimen: Swab from Nares Updated: 07/06/25 2131     MRSA PCR No MRSA Detected    Narrative:      The negative predictive value of this diagnostic test is high and should only be used to consider de-escalating anti-MRSA therapy. A positive result may indicate colonization with MRSA and must be correlated clinically.    Blood Culture - Blood, Arm, Right [[id number]]  (Normal) Collected: 07/06/25 1913    Lab Status: Preliminary result " Specimen: Blood from Arm, Right Updated: 07/08/25 1931     Blood Culture No growth at 2 days    Narrative:      Less than seven (7) mL's of blood was collected.  Insufficient quantity may yield false negative results.    S. Pneumo Ag Urine or CSF - Urine, Urine, Catheter In/Out [[id number]]  (Normal) Collected: 07/06/25 1811    Lab Status: Final result Specimen: Urine, Catheter In/Out Updated: 07/06/25 1849     Strep Pneumo Ag Negative    Legionella Antigen, Urine - Urine, Urine, Catheter In/Out [[id number]]  (Normal) Collected: 07/06/25 1811    Lab Status: Final result Specimen: Urine, Catheter In/Out Updated: 07/06/25 1849     LEGIONELLA ANTIGEN, URINE Negative    Urine Culture - Urine, Urine, Clean Catch [[id number]]  (Normal) Collected: 07/06/25 1811    Lab Status: Final result Specimen: Urine, Clean Catch Updated: 07/08/25 0644     Urine Culture No growth          Imaging Results (Last 72 Hours)       Procedure Component Value Units Date/Time    CT Outside Abd/Pelvis [[id number]] Resulted: 07/07/25 0944     Updated: 07/07/25 0944    Narrative:      This procedure was auto-finalized with no dictation required.    CT Head Without Contrast [[id number]] Collected: 07/06/25 1830     Updated: 07/06/25 1837    Narrative:      CT HEAD WO CONTRAST    Date of Exam: 7/6/2025 6:23 PM EDT    Indication: acute encephalopathy.    Comparison: None available.    Technique: Axial CT images were obtained of the head without contrast administration.  Coronal reconstructions were performed.  Automated exposure control and iterative reconstruction methods were used.        FINDINGS:    Brain/Ventricles: There is no acute intracranial hemorrhage, mass effect or midline shift. No abnormal extra-axial fluid collection.  The gray-white differentiation is maintained without evidence of an acute infarct.  There is no evidence of   hydrocephalus    Orbits: The visualized portion of the orbits demonstrate no acute abnormality.    Incidental note  of the divergent gaze    Sinuses:The visualized paranasal sinuses and mastoid air cells demonstrate no acute abnormality.    Soft Tissues/Skull: No acute abnormality of the visualized skull or soft tissues.      Impression:      No acute intracranial abnormality.      Electronically Signed: Rashaad Joel MD    7/6/2025 6:35 PM EDT    Workstation ID: [id number]    XR Chest 1 View [[id number]] Collected: 07/06/25 1822     Updated: 07/06/25 1825    Narrative:      XR CHEST 1 VW    Date of Exam: 7/6/2025 6:00 PM EDT    Indication: Leukocytosis    Comparison: None available.    Findings:  The right hemidiaphragm is slightly elevated. There are bandlike right perihilar and medial basilar densities representing scarring versus subsegmental atelectasis. The remaining lungs and pleural spaces are clear. The heart size is normal. The pulmonary   vascular markings are normal. There is mild degenerative spondylosis in the thoracic spine.      Impression:      Impression:  1.Slightly elevated right hemidiaphragm.  2.Bandlike right perihilar and medial basilar scarring versus subsegmental atelectasis.        Electronically Signed: Roderick Melton MD    7/6/2025 6:23 PM EDT    Workstation ID: [id number]    US Liver [[id number]] Collected: 07/06/25 1624     Updated: 07/06/25 1627    Narrative:      US LIVER    Date of Exam: 7/6/2025 3:06 PM EDT    Indication: elevated LFT's, check for cirrhosis, ascites, HCC.    Comparison: No comparisons available.    Technique: Grayscale and color Doppler ultrasound evaluation of the liver was performed.      Findings:  Diffuse increased hepatic echogenicity. Fairly homogeneous hepatic echotexture. Liver is enlarged measuring up to 20 cm in length. No solid appearing mass. No visualized ascites. Hepatopetal flow of main portal vein. No intrahepatic duct dilation. Common   bile duct measures up to 4 mm normal for age.      Impression:      Impression:  1. Hepatomegaly with likely mild to moderate  severity hepatic steatosis.  2. No sonographic evidence of hepatocellular carcinoma.  3. No visualized ascites. Hepatopetal flow of main portal vein.        Electronically Signed: Trevor Marks MD    7/6/2025 4:25 PM EDT    Workstation ID: [id number]            Assessment & Plan       Patient admitted into the hospital 7/6/2025.  GI was consulted for elevated bilirubin.  History of alcohol abuse with withdrawal.     ASSESSMENT:  Alcohol hepatitis  Elevated LFTs  Alcohol withdrawal  Electrolyte abnormalities  Thrombocytopenia       PLAN:  -Maddrey discriminant function 38.  Patient started on prednisolone for alcoholic hepatitis 7/6/2025.  Early Lille score 0.008 today.  Patient is to continue prednisolone  x 4 weeks.  - Acute hepatitis panel negative.  - Hemoglobin stable at 12.0.  No overt GI bleeding noted.  Continue to monitor H&H and transfuse as needed for hemoglobin less than 7.  - LFTs improving.  AST/ALT ratio consistent with alcohol hepatitis.  Alk phos 176 from 193 , AST from 122, ALT 75 from 71, total bilirubin 6.7 from 8.1.  - RUQ US-hepatomegaly with likely mild to moderate severe  -Continue CIWA protocol.  -Continue regular/high-protein diet as tolerated  -Continue thiamine, folic acid and zinc due to alcohol abuse.   -Continue lactulose and Xifaxan.  -Encouraged nutrition with high-protein diet.  - Supportive care.    Electronically signed by RYAN Camejo, 07/09/25, 9:20 AM EDT.           Electronically signed by Adrienne Thomas APRN at 07/09/25 0924       Shaina Quiñones DNP, APRN at 07/08/25 1224       Attestation signed by Lulu Orona MD at 07/09/25 0742      I have reviewed the mid-level documentation, and agree with the documentation, medical decision making and treatment plan as outlined by the mid-level provider.    This document has been electronically signed by Lulu Orona MD  July 9, 2025 07:42 EDT                                                                     Chief  "complaint Pt was unable to voice pertinent complaint secondary to confusion     Subjective .     History of present illness:  The patient is a 35 y.o. male who was admitted secondary to history of alcohol use and acute alcohol withdrawal      Psychiatry consulted secondary to alcohol withdrawal.   Pt is pleasantly confused- Oriented to self, believes he is in [facility].   not agitated -Not aggressive- no threats.  Reports daily alcohol use -drinking 10-15 beers per day.The pt is a limited historian secondary to confusion   RN reported CIWA with phenobarbital and BZD - reported as effective for managing sxs of intermittent agitation and confusion. No threatening behaviors- pt no longer requiring restraints for agitation       Today the pt remains confused- delayed   pt was not agitated or aggressive at the time of assessment .   RN reported the pt is now on precedex gtt prn  - no events today       The following portions of the patient's history were reviewed and updated as appropriate: allergies, current medications, past family history, past medical history, past social history, past surgical history and problem list.    History    Objective     Vital Signs   /74   Pulse 113   Temp 98 °F (36.7 °C) (Oral)   Resp (P) 13   Ht 165.1 cm (65\")   Wt 79.8 kg (175 lb 14.8 oz)   SpO2 100%   BMI 29.28 kg/m²       Mental Status Exam:   Hygiene:   fair  Cooperation:  attempting to cooperate   Eye Contact:  Good  Psychomotor Behavior:  Slow  Affect:  Restricted  Mood: CITLALI  Speech:  Minimal  Thought Process:  delayed , confused   Thought Content:  Unable to demonstrate  Suicidal:  not expressed   Homicidal:  not expressed   Hallucinations:  Not demonstrated today  Delusion:  Unable to demonstrate  Memory:  Deficits  Orientation:  Person and    Reliability:  poor  Insight:  Poor  Judgement:  Impaired  Impulse Control:  limited             Assessment & Plan       Alcohol withdrawal delirium       Assessment: " Alcohol dependence, and withdrawal   Treatment Plan: The pt presents with alcohol dependence and withdrawal.   Continue CIWA with phenobarbital and BZD per hospitalist- the pt is on precedex gtt prn .  No indication for additional medications at this time   The pt is confused and unable to participate in discussion of alcohol cessation  -SW following can assist with resources when able to participate    Will follow prn   Treatment Plan discussed with: Patient    I discussed the patients findings and my recommendations with patient and nursing staff    I have reviewed and approved the behavioral health treatment plans and problem list. Yes  Thank you for the consult   Referring MD has access to consult report and progress notes in EMR     Shaina Quiñones DNP, APRN  25  17:41 EDT             Electronically signed by Lulu Orona MD at 25 0742       Tung Fernandez MD at 25 1240            Critical Care Progress Note     Colt Luciano : 1990 MRN:[id number] LOS:2  Rm: 2308/1     Principal Problem: Alcohol withdrawal delirium     Reason for follow up: All the medical problems listed below    Summary     Colt Luciano is a 35 y.o. male with PMH of alcohol abuse disorder [facility] and was admitted with a principal diagnosis of Alcohol withdrawal delirium.  The following information has been obtained primarily from review of documentation and collaboration with the hospitalist as the patient is confused at the time of admission to the ICU.  The [facility] for evaluation of increasing tremors, anxiousness, and abdominal pain.  The patient reported that he usually drinks 8-12 beers per day however quit drinking about 4 days ago.  Friends and his boss noted that he was having increased shaking and brought him to the ED.  Findings in the ED ED were consistent with acute kidney injury and acute  liver injury.  CT of the abdomen revealed hepatomegaly with steatosis and nodular changes consistent with evolving cirrhosis.  Transfer to [facility] was requested for higher level medical management as well as subspecialty availability.  The patient was initially excepted by the hospitalist for admission to the floor however the patient quickly developed fulminant alcohol withdrawal requiring large doses of Valium and physical restraints for his safety.  The patient was then transferred to the ICU for aggressive medical management and Precedex infusion to facilitate patient care.  The patient's is primarily Albanian-speaking and provided some information via electronic         Of note, this patient is marked for a merged chart.  The alternate name is Colt Montano medical record #[id number]       7/7:The patient is awake and communicative today, able to communicate in English for examination. He remains confused and impulsive however redirects easily. Sitter at bedside for patient safety. Started a diet which is tolerated well.  Hold further antibiotics for now, infectious workup benign at this point.  He is stable for transfer out of the ICU    Patient is on Hospital Day: 3.    Significant Events / Subjective     07/08/25 : Patient was reupgraded to the ICU because he was combative and aggressive. Given geodon as well as a significant amount of versed and subsequently required precedex. Was in 4 point restraints for violence. This AM the precedex was weaned off. The patient is comfortable, calm, and conversant. Vital signs are stable. No restraints      If patient is agitated/delirious overnight please make sure to have an  to help him understand.     Assessment / Plan     Acute toxic / metabolic encephalopathy  Hospital acquired delirium  Likely secondary to acute alcohol withdrawal syndrome  EtOH level <10  Urine tox screen positive for barbiturates and benzodiazapine which are  consistent with medications administered since admission. Remaining drugs of abuse are negative   Ammonia level 37  CT head negative for acute intracranial findings  Phenobarbital per [facility] protocol   Supportive care for now.   Precedex as needed for now  Delirium precautions     Alcohol abuse syndrome  Acute alcohol withdrawal syndrome  Monitor with [facility] protocol  Started phenobarbital taper per EtOH withdrawal protocol     Acute liver injury on chronic, alcohol induced liver disease  Hyperammonemia  Liver ultrasound reviewed: Hepatomegaly with likely mild to moderate severity hepatic steatosis, no visualized ascites, hepatopetal flow with main portal vein  INR 1.36  Acute hepatitis panel nonreactive  GI following  Continue rifaximin & lactulose  Ammonia this AM was 93     Leukocytosis  Blood cultures pending and NTD  Urine antigens for Legionella and strep pneumo negative  Urinalysis with reflex culture pending  Chest x-ray no acute findings  Procalcitonin 0.95  Liver ultrasound did not reveal ascites, not likely SBP       Disposition:  PCU    Code status:   Code Status (Patient has no pulse and is not breathing): CPR (Attempt to Resuscitate)  Medical Interventions (Patient has pulse or is breathing): Full Support  Level Of Support Discussed With: Patient       Nutrition:   Diet: High Protein Diet; Fluid Consistency: Thin (IDDSI 0)   Patient isn't on Tube Feeding     VTE Prophylaxis:  Pharmacologic & mechanical VTE prophylaxis orders are present.           Objective / Physical Exam     Vital signs:  Temp: 97.5 °F (36.4 °C)  BP: 105/69  Heart Rate: 72  Resp: (P) 13  SpO2: 96 %  Weight: 79.8 kg (175 lb 14.8 oz)    Admission Weight: Weight: 83.1 kg (183 lb 3.2 oz)  Current Weight: Weight: 79.8 kg (175 lb 14.8 oz)    Input/Output in last 24 hours:    Intake/Output Summary (Last 24 hours) at 7/8/2025 1244  Last data filed at 7/8/2025 0400  Gross per 24 hour   Intake 1460 ml   Output 750 ml   Net 710 ml      Net IO Since  Admission: 1,322 mL [07/08/25 1244]     Physical Exam  Vitals and nursing note reviewed.   Constitutional:       General: He is not in acute distress.     Appearance: He is not ill-appearing.      Comments: Sitting up in bed in NAD   HENT:      Head: Normocephalic and atraumatic.      Right Ear: External ear normal.      Left Ear: External ear normal.   Eyes:      General: Scleral icterus present.      Conjunctiva/sclera: Conjunctivae normal.   Neck:      Comments: Trachea midline  No JVD  Cardiovascular:      Heart sounds: Normal heart sounds, S1 normal and S2 normal. No murmur heard.     Comments: SR  Pulmonary:      Breath sounds: Normal breath sounds. No wheezing or rhonchi.   Abdominal:      General: There is distension.      Tenderness: There is no abdominal tenderness.   Musculoskeletal:      Right lower leg: No edema.      Left lower leg: No edema.   Skin:     General: Skin is warm and dry.      Coloration: Skin is jaundiced.   Neurological:      General: No focal deficit present.      Mental Status: He is alert.      Sensory: No sensory deficit.      Motor: No weakness.          Radiology and Labs     Results from last 7 days   Lab Units 07/08/25  0509 07/07/25  0522 07/06/25  0620   WBC 10*3/mm3 9.47 9.46 12.54*   HEMOGLOBIN g/dL 12.5* 12.1* 13.2   HEMATOCRIT % 36.9* 35.6* 38.2   PLATELETS 10*3/mm3 112* 75* 66*      Results from last 7 days   Lab Units 07/08/25  0509 07/07/25  0522 07/06/25  0620   PROTIME Seconds 15.2* 15.7* 16.8*   INR  1.20* 1.25* 1.36*      Results from last 7 days   Lab Units 07/08/25  0509 07/07/25  0522 07/06/25  1936 07/06/25  1404 07/06/25  0620   SODIUM mmol/L 143 138  --  133* 134*   POTASSIUM mmol/L 3.4* 3.6 3.0* 3.0* 3.3*   CHLORIDE mmol/L 114* 107  --  99 97*   CO2 mmol/L 18.2* 18.3*  --  19.7* 18.5*   ANION GAP mmol/L 10.8 12.7  --  14.3 18.5*   BUN mg/dL 23.2* 28.3*  --  23.0* 23.6*   CREATININE mg/dL 0.69* 0.93  --  1.10 1.66*   GLUCOSE mg/dL 137* 84  --  85 90    PHOSPHORUS mg/dL 2.0*  --   --  2.5  --    MAGNESIUM mg/dL 2.4 2.6  --  2.3 2.0   ALT (SGPT) U/L 71* 56*  --   --  47*   AST (SGOT) U/L 122* 135*  --   --  121*   ALK PHOS U/L 193* 174*  --   --  213*      Results from last 7 days   Lab Units 07/08/25  0509 07/07/25  0522 07/06/25  0620   ALT (SGPT) U/L 71* 56* 47*   AST (SGOT) U/L 122* 135* 121*   ALK PHOS U/L 193* 174* 213*           CT Head Without Contrast  Result Date: 7/6/2025  No acute intracranial abnormality. Electronically Signed: Rashaad Joel MD  7/6/2025 6:35 PM EDT  Workstation ID: [id number]    XR Chest 1 View  Result Date: 7/6/2025  Impression: 1.Slightly elevated right hemidiaphragm. 2.Bandlike right perihilar and medial basilar scarring versus subsegmental atelectasis. Electronically Signed: Roderick Melton MD  7/6/2025 6:23 PM EDT  Workstation ID: [id number]    US Liver  Result Date: 7/6/2025  Impression: 1. Hepatomegaly with likely mild to moderate severity hepatic steatosis. 2. No sonographic evidence of hepatocellular carcinoma. 3. No visualized ascites. Hepatopetal flow of main portal vein. Electronically Signed: Trevor Marks MD  7/6/2025 4:25 PM EDT  Workstation ID: [id number]      Current medications     Scheduled Meds:   enoxaparin sodium, 40 mg, Subcutaneous, Q24H  famotidine, 20 mg, Oral, BID AC  folic acid 1 mg in sodium chloride 0.9 % 50 mL IVPB, 1 mg, Intravenous, Daily  lactulose, 30 g, Oral, TID  multivitamin with minerals, 1 tablet, Oral, Daily  PHENobarbital, 32.4 mg, Oral, Once   Followed by  PHENobarbital, 32.4 mg, Oral, Once   Followed by  [START ON 7/9/2025] PHENobarbital, 32.4 mg, Oral, Once  prednisoLONE sodium phosphate, 40 mg, Oral, Daily  rifAXIMin, 550 mg, Oral, Q12H  sodium chloride, 10 mL, Intravenous, Q12H  thiamine (B-1) IV, 500 mg, Intravenous, Q8H   Followed by  [START ON 7/9/2025] thiamine (B-1) IV, 200 mg, Intravenous, Q8H   Followed by  [START ON 7/13/2025] thiamine, 100 mg, Oral, Daily  zinc sulfate, 220 mg, Oral,  Daily        Continuous Infusions:          Plan discussed with RN. Reviewed all other data in the last 24 hours, including but not limited to vitals, labs, microbiology, imaging and pertinent notes from other providers.      Tung Fernandez MD   Critical Care  07/08/25   12:44 EDT     Electronically signed by Tung Fernandez MD at 07/08/25 1244       Adrienne Thomas APRN at 07/08/25 0903       Attestation signed by Garrett Patel MD at 07/08/25 1713      The patient was seen and examined with gastroenterology advanced practice provider, Adrienne Thomas, TRICIA. I personally performed the substantive portion of the history of presenting illness.  I also performed the physical exam and the medical decision making.    Patient is sedated on Precedex and not arousable.  He is less jaundiced today and abdomen is soft.  Total bilirubin 8.1 from 12.6 and INR 1.2.  CR 0.6  Impression:  Severe alcohol induced hepatitis receiving prednisolone with excellent response  Alcohol withdrawal  Plan:  Continue supportive care for alcohol withdrawal  Continue prednisolone  Continue lactulose and Xifaxan  Encourage p.o. intake  Electronically signed by Garrett Patel MD, 07/08/25, 5:14 PM EDT.                            LOS: 2 days   Patient Care Team:  Joyce Shannon APRN as PCP - General (Nurse Practitioner)      Subjective     Interval History:     Subjective: Due to altered mental status, history is supplemented via chart review and bedside RN.  Patient became agitated overnight and required higher dose of Precedex and wrist restraints.  Unable to arouse patient with verbal or physical stimuli this morning.  No nausea, vomiting, BRBPR, or melena noted overnight.    ROS:   Unable to assess due to altered status.     Medication Review:     Current Facility-Administered Medications:     aluminum-magnesium hydroxide-simethicone (MAALOX MAX) 400-400-40 MG/5ML suspension 15 mL, 15 mL, Oral, Q6H PRN, Ann Kearns,  APRN    sennosides-docusate (PERICOLACE) 8.6-50 MG per tablet 2 tablet, 2 tablet, Oral, BID PRN **AND** polyethylene glycol (MIRALAX) packet 17 g, 17 g, Oral, Daily PRN **AND** bisacodyl (DULCOLAX) EC tablet 5 mg, 5 mg, Oral, Daily PRN **AND** bisacodyl (DULCOLAX) suppository 10 mg, 10 mg, Rectal, Daily PRN, Ann Kearns, APRN    Calcium Replacement - Follow Nurse / BPA Driven Protocol, , Not Applicable, PRN, Bryan De La Garza DO    famotidine (PEPCID) tablet 20 mg, 20 mg, Oral, BID AC, Jason Carpio MD    folic acid 1 mg in sodium chloride 0.9 % 50 mL IVPB, 1 mg, Intravenous, Daily, Day, Lucila G, APRN, Last Rate: 100 mL/hr at 07/07/25 1027, 1 mg at 07/07/25 1027    HYDROmorphone (DILAUDID) injection 0.5 mg, 0.5 mg, Intravenous, Q2H PRN, Angelique López, APRN, 0.5 mg at 07/07/25 0255    lactulose (CHRONULAC) 10 GM/15ML solution 30 g, 30 g, Oral, TID, Caitlin Alvarado, APRN, 30 g at 07/07/25 2027    loperamide (IMODIUM) capsule 2 mg, 2 mg, Oral, 4x Daily PRN, Susan Wilhelm PA-C    LORazepam (ATIVAN) tablet 1 mg, 1 mg, Oral, Q1H PRN **OR** midazolam (VERSED) injection 2 mg, 2 mg, Intravenous, Q1H PRN **OR** LORazepam (ATIVAN) tablet 2 mg, 2 mg, Oral, Q1H PRN, 2 mg at 07/07/25 2027 **OR** midazolam (VERSED) injection 4 mg, 4 mg, Intravenous, Q1H PRN, 4 mg at 07/07/25 2329 **OR** midazolam (VERSED) injection 4 mg, 4 mg, Intravenous, Q15 Min PRN, 4 mg at 07/08/25 0022 **OR** midazolam (VERSED) injection 4 mg, 4 mg, Intramuscular, Q15 Min PRN, Susan Wilhelm PA-C    Magnesium Standard Dose Replacement - Follow Nurse / BPA Driven Protocol, , Not Applicable, PRN, Ann Kearns, APRN    Magnesium Standard Dose Replacement - Follow Nurse / BPA Driven Protocol, , Not Applicable, PRN, Bryan De La Garza, DO    multivitamin with minerals 1 tablet, 1 tablet, Oral, Daily, Day, Lucila ZIMMERMAN, RYAN, 1 tablet at 07/07/25 1027    nitroglycerin (NITROSTAT) SL tablet 0.4 mg, 0.4 mg, Sublingual, Q5 Min PRN,  Bryan De La Garza DO    [COMPLETED] PHENobarbital injection 65 mg, 65 mg, Intravenous, Once, 65 mg at 07/07/25 0504 **FOLLOWED BY** [COMPLETED] PHENobarbital injection 65 mg, 65 mg, Intravenous, Once, 65 mg at 07/07/25 1857 **FOLLOWED BY** PHENobarbital tablet 32.4 mg, 32.4 mg, Oral, Once **FOLLOWED BY** PHENobarbital tablet 32.4 mg, 32.4 mg, Oral, Once **FOLLOWED BY** [START ON 7/9/2025] PHENobarbital tablet 32.4 mg, 32.4 mg, Oral, Once, Day, Lucila G, APRN    Phosphorus Replacement - Follow Nurse / BPA Driven Protocol, , Not Applicable, PRN, Bryan De La Garza DO    potassium chloride 10 mEq in 100 mL IVPB, 10 mEq, Intravenous, Q1H, Dodie Linda, APRN, Last Rate: 100 mL/hr at 07/08/25 0936, 10 mEq at 07/08/25 0936    potassium phosphate 15 mmol in 0.9% normal saline 250 mL IVPB, 15 mmol, Intravenous, Once, Anton Lindaa, APRN, 15 mmol at 07/08/25 0827    Potassium Replacement - Follow Nurse / BPA Driven Protocol, , Not Applicable, PRN, Bryan De La Garza DO    prednisoLONE sodium phosphate (ORAPRED) 15 MG/5ML oral solution 40 mg, 40 mg, Oral, Daily, Caitlin Alvarado APRN, 40 mg at 07/07/25 1028    riFAXIMin (XIFAXAN) tablet 550 mg, 550 mg, Oral, Q12H, Caitlin Alvarado APRN, 550 mg at 07/07/25 2027    sodium chloride 0.9 % flush 10 mL, 10 mL, Intravenous, Q12H, ZacharyterAxel villatorole, APRN, 10 mL at 07/08/25 0937    sodium chloride 0.9 % flush 10 mL, 10 mL, Intravenous, PRN, AchterAxel villatorole, APRN, 10 mL at 07/06/25 0808    sodium chloride 0.9 % infusion 40 mL, 40 mL, Intravenous, PRN, Axel Kearnsle, APRN    thiamine (B-1) 500 mg in sodium chloride 0.9 % 100 mL IVPB, 500 mg, Intravenous, Q8H, Last Rate: 200 mL/hr at 07/08/25 0501, 500 mg at 07/08/25 0501 **FOLLOWED BY** [START ON 7/9/2025] thiamine (B-1) injection 200 mg, 200 mg, Intravenous, Q8H **FOLLOWED BY** [START ON 7/13/2025] thiamine (VITAMIN B-1) tablet 100 mg, 100 mg, Oral, Daily, Day, RYAN Jovel    zinc sulfate (ZINCATE)  capsule 220 mg, 220 mg, Oral, Daily, Caitlin Alvarado APRN, 220 mg at 07/07/25 1028      Objective     Vital Signs  Vitals:    07/08/25 0700 07/08/25 0800 07/08/25 0842 07/08/25 0900   BP: 166/69 126/86  105/69   BP Location:       Patient Position:       Pulse: 75 74 72 72   Resp:  11     Temp:  97.8 °F (36.6 °C)     TempSrc:  Oral     SpO2: 96% 95% 96% 96%   Weight:       Height:           Physical Exam:     General Appearance:  Obtunded, sedated with Precedex, in no acute distress   Head:    Normocephalic, without obvious abnormality   Eyes:          Conjunctivae normal, anicteric sclera         Neck:   supple, no JVD   Lungs:     respirations regular, even and unlabored, room air   Abdomen:     Soft, no signs of tenderness with palpation, non-distended         Extremities:   No edema, no cyanosis   Skin:   No bruising or rash, + jaundice        Results Review:    Results from last 7 days   Lab Units 07/08/25  0509 07/07/25  0522 07/06/25  0620   WBC 10*3/mm3 9.47 9.46 12.54*   HEMOGLOBIN g/dL 12.5* 12.1* 13.2   PLATELETS 10*3/mm3 112* 75* 66*     Results from last 7 days   Lab Units 07/08/25  0509 07/07/25  0522 07/06/25  1936 07/06/25  1404 07/06/25  0620   SODIUM mmol/L 143 138  --  133* 134*   POTASSIUM mmol/L 3.4* 3.6 3.0* 3.0* 3.3*   CHLORIDE mmol/L 114* 107  --  99 97*   CO2 mmol/L 18.2* 18.3*  --  19.7* 18.5*   BUN mg/dL 23.2* 28.3*  --  23.0* 23.6*   CREATININE mg/dL 0.69* 0.93  --  1.10 1.66*   GLUCOSE mg/dL 137* 84  --  85 90   ALBUMIN g/dL 3.2* 3.1*  --   --  3.4*   BILIRUBIN mg/dL 8.1* 12.6*  --   --  14.5*   ALK PHOS U/L 193* 174*  --   --  213*   AST (SGOT) U/L 122* 135*  --   --  121*   ALT (SGPT) U/L 71* 56*  --   --  47*   INR  1.20* 1.25*  --   --  1.36*   MAGNESIUM mg/dL 2.4 2.6  --  2.3 2.0   PHOSPHORUS mg/dL 2.0*  --   --  2.5  --    LIPASE U/L  --   --   --   --  39   AMMONIA umol/L  --   --  37  --   --      Estimated Creatinine Clearance: 148.2 mL/min (A) (by C-G formula based on SCr of  "0.69 mg/dL (L)).  No results found for: \"HGBA1C\"      Infection   Results from last 7 days   Lab Units 07/06/25 1944 07/06/25 1913 07/06/25 1811 07/06/25  1404   BLOODCX  No growth at 24 hours No growth at 24 hours  --   --    URINECX   --   --  No growth  --    PROCALCITONIN ng/mL  --   --   --  0.95*     UA    Results from last 7 days   Lab Units 07/06/25 1811   NITRITE UA  Positive*   WBC UA /HPF 6-10*   BACTERIA UA /HPF None Seen   SQUAM EPITHEL UA /HPF 3-6*   URINECX  No growth     Microbiology Results (last 10 days)       Procedure Component Value - Date/Time    Blood Culture - Blood, Arm, Left [[id number]]  (Normal) Collected: 07/06/25 1944    Lab Status: Preliminary result Specimen: Blood from Arm, Left Updated: 07/07/25 2000     Blood Culture No growth at 24 hours    MRSA Screen, PCR (Inpatient) - Swab, Nares [[id number]]  (Normal) Collected: 07/06/25 1930    Lab Status: Final result Specimen: Swab from Nares Updated: 07/06/25 2131     MRSA PCR No MRSA Detected    Narrative:      The negative predictive value of this diagnostic test is high and should only be used to consider de-escalating anti-MRSA therapy. A positive result may indicate colonization with MRSA and must be correlated clinically.    Blood Culture - Blood, Arm, Right [[id number]]  (Normal) Collected: 07/06/25 1913    Lab Status: Preliminary result Specimen: Blood from Arm, Right Updated: 07/07/25 1931     Blood Culture No growth at 24 hours    Narrative:      Less than seven (7) mL's of blood was collected.  Insufficient quantity may yield false negative results.    S. Pneumo Ag Urine or CSF - Urine, Urine, Catheter In/Out [[id number]]  (Normal) Collected: 07/06/25 1811    Lab Status: Final result Specimen: Urine, Catheter In/Out Updated: 07/06/25 1849     Strep Pneumo Ag Negative    Legionella Antigen, Urine - Urine, Urine, Catheter In/Out [[id number]]  (Normal) Collected: 07/06/25 1811    Lab Status: Final result Specimen: Urine, Catheter " In/Out Updated: 07/06/25 1849     LEGIONELLA ANTIGEN, URINE Negative    Urine Culture - Urine, Urine, Clean Catch [[id number]]  (Normal) Collected: 07/06/25 1811    Lab Status: Final result Specimen: Urine, Clean Catch Updated: 07/08/25 0644     Urine Culture No growth          Imaging Results (Last 72 Hours)       Procedure Component Value Units Date/Time    CT Outside Abd/Pelvis [[id number]] Resulted: 07/07/25 0944     Updated: 07/07/25 0944    Narrative:      This procedure was auto-finalized with no dictation required.    CT Head Without Contrast [[id number]] Collected: 07/06/25 1830     Updated: 07/06/25 1837    Narrative:      CT HEAD WO CONTRAST    Date of Exam: 7/6/2025 6:23 PM EDT    Indication: acute encephalopathy.    Comparison: None available.    Technique: Axial CT images were obtained of the head without contrast administration.  Coronal reconstructions were performed.  Automated exposure control and iterative reconstruction methods were used.        FINDINGS:    Brain/Ventricles: There is no acute intracranial hemorrhage, mass effect or midline shift. No abnormal extra-axial fluid collection.  The gray-white differentiation is maintained without evidence of an acute infarct.  There is no evidence of   hydrocephalus    Orbits: The visualized portion of the orbits demonstrate no acute abnormality.    Incidental note of the divergent gaze    Sinuses:The visualized paranasal sinuses and mastoid air cells demonstrate no acute abnormality.    Soft Tissues/Skull: No acute abnormality of the visualized skull or soft tissues.      Impression:      No acute intracranial abnormality.      Electronically Signed: Rashaad Joel MD    7/6/2025 6:35 PM EDT    Workstation ID: [id number]    XR Chest 1 View [[id number]] Collected: 07/06/25 1822     Updated: 07/06/25 1825    Narrative:      XR CHEST 1 VW    Date of Exam: 7/6/2025 6:00 PM EDT    Indication: Leukocytosis    Comparison: None available.    Findings:  The right  hemidiaphragm is slightly elevated. There are bandlike right perihilar and medial basilar densities representing scarring versus subsegmental atelectasis. The remaining lungs and pleural spaces are clear. The heart size is normal. The pulmonary   vascular markings are normal. There is mild degenerative spondylosis in the thoracic spine.      Impression:      Impression:  1.Slightly elevated right hemidiaphragm.  2.Bandlike right perihilar and medial basilar scarring versus subsegmental atelectasis.        Electronically Signed: Roderick Melton MD    7/6/2025 6:23 PM EDT    Workstation ID: [id number]    US Liver [[id number]] Collected: 07/06/25 1624     Updated: 07/06/25 1627    Narrative:      US LIVER    Date of Exam: 7/6/2025 3:06 PM EDT    Indication: elevated LFT's, check for cirrhosis, ascites, HCC.    Comparison: No comparisons available.    Technique: Grayscale and color Doppler ultrasound evaluation of the liver was performed.      Findings:  Diffuse increased hepatic echogenicity. Fairly homogeneous hepatic echotexture. Liver is enlarged measuring up to 20 cm in length. No solid appearing mass. No visualized ascites. Hepatopetal flow of main portal vein. No intrahepatic duct dilation. Common   bile duct measures up to 4 mm normal for age.      Impression:      Impression:  1. Hepatomegaly with likely mild to moderate severity hepatic steatosis.  2. No sonographic evidence of hepatocellular carcinoma.  3. No visualized ascites. Hepatopetal flow of main portal vein.        Electronically Signed: Trevor Marks MD    7/6/2025 4:25 PM EDT    Workstation ID: [id number]            Assessment & Plan       Patient admitted into the hospital 7/6/2025.  GI was consulted for elevated bilirubin.  History of alcohol abuse with withdrawal.     ASSESSMENT:  Alcohol hepatitis  Elevated LFTs  Alcohol withdrawal  Electrolyte abnormalities  Thrombocytopenia        PLAN:  -Aurelia discriminant function 38 survival.  Patient started on  prednisolone for alcoholic hepatitis 2025.  Calculate Lille score on day 7 (2025) or consider calculating early on day 4 (2025).  - Acute hepatitis panel negative.  - Hemoglobin 12.5 from 12.1.  No overt GI bleeding noted.  Continue to monitor H&H and transfuse as needed for hemoglobin less than 7.  - LFTs fluctuating.  AST/ALT ratio consistent with alcohol hepatitis.  Alk phos 193 from 174 ,  from 135, ALT 71 from 56, total bilirubin 8.1 from 12.6.  - RUQ US-hepatomegaly with likely mild to moderate severe  -Continue CIWA protocol.  -Continue regular/high-protein diet as tolerated  -Continue thiamine, folic acid and zinc due to alcohol abuse.   -Continue lactulose and Xifaxan.  -Encouraged nutrition with high-protein diet.  - Supportive care.    Electronically signed by RYAN Camejo, 25, 9:50 AM EDT.           Electronically signed by Garrett Patel MD at 25 1715       Susan Wilhelm PA-C at 25 1530              [facility]  [facility]SFER OF CARE/ACCEPTANCE NOTE    PATIENT NAME: Colt Luciano  : 1990  MRN: [id number]     Active Hospital Problems    Diagnosis  POA    **Alcohol withdrawal delirium [F10.931]  Yes      Resolved Hospital Problems   No resolved problems to display.       Patient seen and examined by me on 25 at 1430.  Pt sleeping comfortably in bed. No signs of distress.    Interim History: Colt Luciano is a 35 y.o. male with PMH of alcohol abuse disorder presented to the hospital of an [facility], and was admitted with a principal diagnosis of Alcohol withdrawal delirium.  The following information has been obtained primarily from review of documentation and collaboration with the hospitalist as the patient is confused at the time of admission to the ICU.  The patient reportedly presented to the ED of an [facility] facility overnight for evaluation of increasing tremors, anxiousness, and abdominal pain.   The patient reported that he usually drinks 8-12 beers per day however quit drinking about 4 days ago.  Friends and his boss noted that he was having increased shaking and brought him to the ED.  Findings in the ED ED were consistent with acute kidney injury and acute liver injury.  CT of the abdomen revealed hepatomegaly with steatosis and nodular changes consistent with evolving cirrhosis.  Transfer to [facility] was requested for higher level medical management as well as subspecialty availability.  The patient was initially excepted by the hospitalist for admission to the floor however the patient quickly developed fulminant alcohol withdrawal requiring large doses of Valium and physical restraints for his safety.  The patient was then transferred to the ICU for aggressive medical management and Precedex infusion to facilitate patient care.  The patient's is primarily Nepalese-speaking and provided some information via electronic      Of note, this patient is marked for a merged chart.  The alternate name is Colt Montano medical record #[id number]        Patient is on Hospital Day: 2.   07/07/25 : The patient is awake and communicative today, able to communicate in English for examination. He remains confused and impulsive however redirects easily. Sitter at bedside for patient safety. Started a diet which is tolerated well.  Hold further antibiotics for now, infectious workup benign at this point.  He is stable for transfer out of the ICU     I have noted the following changes since admission: Pt was started on rocephin daily for SBP prophylaxis and questionable UTI, received 1 dose. Abx dc'd today during rounds by pharmacy.   -Pt without any infectious signs or sx except for tachycardia and new diarrhea, however, he was started on lactulose per GI for alcoholic hepatitis and severe hepatic steatosis on US of liver.  -No ascites on imaging  -Continue phenobarb taper and CIWA  protocol    Acute toxic / metabolic encephalopathy  Likely secondary to acute alcohol withdrawal syndrome  EtOH level <10  Urine tox screen positive for barbiturates and benzodiazapine which are consistent with medications administered since admission. Remaining drugs of abuse are negative   Ammonia level 37  CT head negative for acute intracranial findings  Phenobarbital per [facility] protocol   Supportive care for now.      Alcohol abuse syndrome  Acute alcohol withdrawal syndrome  Monitor with [facility] protocol  Started phenobarbital taper per EtOH withdrawal protocol     Acute liver injury on chronic, alcohol induced liver disease  Liver ultrasound reviewed: Hepatomegaly with likely mild to moderate severity hepatic steatosis, no visualized ascites, hepatopetal flow with main portal vein  INR 1.36  Acute hepatitis panel nonreactive  GI following  Continue rifaximin & lactulose     Leukocytosis  Blood cultures pending and NTD  Urine antigens for Legionella and strep pneumo negative  Urinalysis with reflex culture pending  Chest x-ray no acute findings  Procalcitonin 0.95  Liver ultrasound did not reveal ascites, not likely SBP    I have reviewed the H&P, diagnostic data and plan of care for Colt Luciano.  I will be taking over care of this patient during the current hospitalization.      This is a non-billable note      Signature: Electronically signed by Susan Wilhelm PA-C, 25, 15:30 EDT.  [facility] Hospitalist Team            Electronically signed by Susan Wilhelm PA-C at 25 1654       Lucila Westbrook APRN at 25 1241       Attestation signed by Tung Fernandez MD at 25 1344      I have reviewed this documentation and agree.                        Critical Care Progress Note     Colt Luciano : 1990 MRN:[id number] LOS:1  Rm: 2308/1     Principal Problem: Alcohol withdrawal delirium     Reason for follow up: All the medical problems listed below    Summary     Colt  Ankita is a 35 y.o. male with PMH of alcohol abuse disorder presented to the hospital of an outlying facility, and was admitted with a principal diagnosis of Alcohol withdrawal delirium.  The following information has been obtained primarily from review of documentation and collaboration with the hospitalist as the patient is confused at the time of admission to the ICU.  The patient reportedly presented to the ED of an outlying facility overnight for evaluation of increasing tremors, anxiousness, and abdominal pain.  The patient reported that he usually drinks 8-12 beers per day however quit drinking about 4 days ago.  Friends and his boss noted that he was having increased shaking and brought him to the ED.  Findings in the ED ED were consistent with acute kidney injury and acute liver injury.  CT of the abdomen revealed hepatomegaly with steatosis and nodular changes consistent with evolving cirrhosis.  Transfer to [facility] was requested for higher level medical management as well as subspecialty availability.  The patient was initially excepted by the hospitalist for admission to the floor however the patient quickly developed fulminant alcohol withdrawal requiring large doses of Valium and physical restraints for his safety.  The patient was then transferred to the ICU for aggressive medical management and Precedex infusion to facilitate patient care.  The patient's is primarily Yi-speaking and provided some information via electronic         Of note, this patient is marked for a merged chart.  The alternate name is Colt Montano medical record #[id number]       Patient is on Hospital Day: 2.    Significant Events / Subjective     07/07/25 : The patient is awake and communicative today, able to communicate in English for examination. He remains confused and impulsive however redirects easily. Sitter at bedside for patient safety. Started a diet which is tolerated well.  Hold further  antibiotics for now, infectious workup benign at this point.  He is stable for transfer out of the ICU    Assessment / Plan     Acute toxic / metabolic encephalopathy  Likely secondary to acute alcohol withdrawal syndrome  EtOH level <10  Urine tox screen positive for barbiturates and benzodiazapine which are consistent with medications administered since admission. Remaining drugs of abuse are negative   Ammonia level 37  CT head negative for acute intracranial findings  Phenobarbital per [facility] protocol   Supportive care for now.      Alcohol abuse syndrome  Acute alcohol withdrawal syndrome  Monitor with [facility] protocol  Started phenobarbital taper per EtOH withdrawal protocol     Acute liver injury on chronic, alcohol induced liver disease  Liver ultrasound reviewed: Hepatomegaly with likely mild to moderate severity hepatic steatosis, no visualized ascites, hepatopetal flow with main portal vein  INR 1.36  Acute hepatitis panel nonreactive  GI following  Continue rifaximin & lactulose     Leukocytosis  Blood cultures pending and NTD  Urine antigens for Legionella and strep pneumo negative  Urinalysis with reflex culture pending  Chest x-ray no acute findings  Procalcitonin 0.95  Liver ultrasound did not reveal ascites, not likely SBP       Disposition:  PCU    Code status:   Code Status (Patient has no pulse and is not breathing): CPR (Attempt to Resuscitate)  Medical Interventions (Patient has pulse or is breathing): Full Support  Level Of Support Discussed With: Patient       Nutrition:   Diet: High Protein Diet; Fluid Consistency: Thin (IDDSI 0)   Patient isn't on Tube Feeding     VTE Prophylaxis:  Mechanical VTE prophylaxis orders are present.           Objective / Physical Exam     Vital signs:  Temp: 98.1 °F (36.7 °C)  BP: 95/53  Heart Rate: 85  Resp: 14  SpO2: 100 %  Weight: 79.8 kg (175 lb 14.8 oz)    Admission Weight: Weight: 83.1 kg (183 lb 3.2 oz)  Current Weight: Weight: 79.8 kg (175 lb 14.8  oz)    Input/Output in last 24 hours:    Intake/Output Summary (Last 24 hours) at 7/7/2025 1241  Last data filed at 7/7/2025 0600  Gross per 24 hour   Intake 1912 ml   Output 1250 ml   Net 662 ml      Net IO Since Admission: 662 mL [07/07/25 1241]     Physical Exam  Vitals and nursing note reviewed.   Constitutional:       General: He is not in acute distress.     Appearance: He is ill-appearing.      Comments: Tremors    HENT:      Head: Normocephalic and atraumatic.      Right Ear: External ear normal.      Left Ear: External ear normal.   Eyes:      General: Scleral icterus present.      Conjunctiva/sclera: Conjunctivae normal.   Neck:      Comments: Trachea midline  No JVD  Cardiovascular:      Heart sounds: Normal heart sounds, S1 normal and S2 normal. No murmur heard.     Comments: SR  Pulmonary:      Breath sounds: Normal breath sounds. No wheezing or rhonchi.   Abdominal:      General: There is distension.      Tenderness: There is no abdominal tenderness.   Musculoskeletal:      Right lower leg: No edema.      Left lower leg: No edema.   Skin:     General: Skin is warm and dry.      Coloration: Skin is jaundiced.   Neurological:      Mental Status: He is alert.      Comments: Confused and impulsive   Agitated at times  Tremors           Radiology and Labs     Results from last 7 days   Lab Units 07/07/25  0522 07/06/25  0620   WBC 10*3/mm3 9.46 12.54*   HEMOGLOBIN g/dL 12.1* 13.2   HEMATOCRIT % 35.6* 38.2   PLATELETS 10*3/mm3 75* 66*      Results from last 7 days   Lab Units 07/07/25  0522 07/06/25  0620   PROTIME Seconds 15.7* 16.8*   INR  1.25* 1.36*      Results from last 7 days   Lab Units 07/07/25  0522 07/06/25  1936 07/06/25  1404 07/06/25  0620   SODIUM mmol/L 138  --  133* 134*   POTASSIUM mmol/L 3.6 3.0* 3.0* 3.3*   CHLORIDE mmol/L 107  --  99 97*   CO2 mmol/L 18.3*  --  19.7* 18.5*   ANION GAP mmol/L 12.7  --  14.3 18.5*   BUN mg/dL 28.3*  --  23.0* 23.6*   CREATININE mg/dL 0.93  --  1.10 1.66*    GLUCOSE mg/dL 84  --  85 90   PHOSPHORUS mg/dL  --   --  2.5  --    MAGNESIUM mg/dL 2.6  --  2.3 2.0   ALT (SGPT) U/L 56*  --   --  47*   AST (SGOT) U/L 135*  --   --  121*   ALK PHOS U/L 174*  --   --  213*      Results from last 7 days   Lab Units 07/07/25  0522 07/06/25  0620   ALT (SGPT) U/L 56* 47*   AST (SGOT) U/L 135* 121*   ALK PHOS U/L 174* 213*           CT Head Without Contrast  Result Date: 7/6/2025  No acute intracranial abnormality. Electronically Signed: Rashaad Joel MD  7/6/2025 6:35 PM EDT  Workstation ID: [id number]    XR Chest 1 View  Result Date: 7/6/2025  Impression: 1.Slightly elevated right hemidiaphragm. 2.Bandlike right perihilar and medial basilar scarring versus subsegmental atelectasis. Electronically Signed: Roderick Melton MD  7/6/2025 6:23 PM EDT  Workstation ID: [id number]    US Liver  Result Date: 7/6/2025  Impression: 1. Hepatomegaly with likely mild to moderate severity hepatic steatosis. 2. No sonographic evidence of hepatocellular carcinoma. 3. No visualized ascites. Hepatopetal flow of main portal vein. Electronically Signed: Trevor Marks MD  7/6/2025 4:25 PM EDT  Workstation ID: [id number]      Current medications     Scheduled Meds:   folic acid 1 mg in sodium chloride 0.9 % 50 mL IVPB, 1 mg, Intravenous, Daily  lactulose, 30 g, Oral, TID  multivitamin with minerals, 1 tablet, Oral, Daily  pantoprazole, 40 mg, Intravenous, Q AM  PHENobarbital, 65 mg, Intravenous, Once   Followed by  [START ON 7/8/2025] PHENobarbital, 32.4 mg, Oral, Once   Followed by  [START ON 7/8/2025] PHENobarbital, 32.4 mg, Oral, Once   Followed by  [START ON 7/9/2025] PHENobarbital, 32.4 mg, Oral, Once  prednisoLONE sodium phosphate, 40 mg, Oral, Daily  rifAXIMin, 550 mg, Oral, Q12H  sodium chloride, 10 mL, Intravenous, Q12H  thiamine (B-1) IV, 500 mg, Intravenous, Q8H   Followed by  [START ON 7/9/2025] thiamine (B-1) IV, 200 mg, Intravenous, Q8H   Followed by  [START ON 7/13/2025] thiamine, 100 mg, Oral,  Daily  zinc sulfate, 220 mg, Oral, Daily        Continuous Infusions:          Plan discussed with RN. Reviewed all other data in the last 24 hours, including but not limited to vitals, labs, microbiology, imaging and pertinent notes from other providers.      RYAN Solomon   Critical Care  07/07/25   12:41 EDT     Electronically signed by Tung Fernandez MD at 07/07/25 1344       Adrienne Thomas APRN at 07/07/25 0933       Attestation signed by Garrett Patel MD at 07/07/25 1516      The patient was seen and examined with gastroenterology advanced practice provider, Adrienne Thomas, TRICIA. I personally performed the substantive portion of the history of presenting illness.  I also performed the physical exam and the medical decision making.    Patient is confused and sleepy but does wake to stimulation.  He is also jaundiced.  Abdomen is soft and nontender.  WBC 9.4, Hgb 12, PLT 75, CR 0.9, TB 12.6, INR 1.2, , ammonia 37, hepatitis panel negative  Impression:  Severe alcohol induced hepatitis receiving prednisolone  Alcohol withdrawal  Plan:  Continue supportive care for alcohol withdrawal  Continue prednisolone  Continue lactulose and Xifaxan  Encourage p.o. intake  Electronically signed by Garrett Patel MD, 07/07/25, 3:14 PM EDT.                            LOS: 1 day   Patient Care Team:  Provider, No Known as PCP - General      Subjective     Interval History:     Subjective: Patient denies abdominal pain.  Patient continues to have sitter at bedside due to altered mental status.  No nausea or vomiting.  Patient more awake today.      ROS:   No chest pain, shortness of breath, or cough.        Medication Review:     Current Facility-Administered Medications:     aluminum-magnesium hydroxide-simethicone (MAALOX MAX) 400-400-40 MG/5ML suspension 15 mL, 15 mL, Oral, Q6H PRN, Ann Kearns APRN    sennosides-docusate (PERICOLACE) 8.6-50 MG per tablet 2 tablet, 2 tablet, Oral, BID  PRN **AND** polyethylene glycol (MIRALAX) packet 17 g, 17 g, Oral, Daily PRN **AND** bisacodyl (DULCOLAX) EC tablet 5 mg, 5 mg, Oral, Daily PRN **AND** bisacodyl (DULCOLAX) suppository 10 mg, 10 mg, Rectal, Daily PRN, Ann Kearns, APRN    Calcium Replacement - Follow Nurse / BPA Driven Protocol, , Not Applicable, PRN, Bryan De La Garza,     cefTRIAXone (ROCEPHIN) 2,000 mg in sodium chloride 0.9 % 100 mL MBP, 2,000 mg, Intravenous, Q24H, Day, Lucila G, APRN, Last Rate: 200 mL/hr at 07/06/25 2000, 2,000 mg at 07/06/25 2000    dexmedetomidine (PRECEDEX) 400 mcg in 100 mL NS infusion, 0.2-1.5 mcg/kg/hr, Intravenous, Titrated, Angelique López, RYAN    folic acid 1 mg in sodium chloride 0.9 % 50 mL IVPB, 1 mg, Intravenous, Daily, Day, Lucila G, APRN, Last Rate: 100 mL/hr at 07/06/25 1305, 1 mg at 07/06/25 1305    HYDROmorphone (DILAUDID) injection 0.5 mg, 0.5 mg, Intravenous, Q2H PRN, Angelique López APRCARLY, 0.5 mg at 07/07/25 0255    lactulose (CHRONULAC) 10 GM/15ML solution 30 g, 30 g, Oral, TID, Caitlin Alvarado APRN, 30 g at 07/06/25 2015    LORazepam (ATIVAN) tablet 1 mg, 1 mg, Oral, Q1H PRN **OR** midazolam (VERSED) injection 2 mg, 2 mg, Intravenous, Q1H PRN **OR** LORazepam (ATIVAN) tablet 2 mg, 2 mg, Oral, Q1H PRN, 2 mg at 07/06/25 2015 **OR** midazolam (VERSED) injection 4 mg, 4 mg, Intravenous, Q1H PRN, 4 mg at 07/07/25 0215 **OR** midazolam (VERSED) injection 4 mg, 4 mg, Intravenous, Q15 Min PRN, 4 mg at 07/06/25 1229 **OR** midazolam (VERSED) injection 4 mg, 4 mg, Intramuscular, Q15 Min PRN, Day, Lucila G, APRN    Magnesium Standard Dose Replacement - Follow Nurse / BPA Driven Protocol, , Not Applicable, PRN, Ann Kearns, APRN    Magnesium Standard Dose Replacement - Follow Nurse / BPA Driven Protocol, , Not Applicable, PRN, Bryan De La Garza, DO    multivitamin with minerals 1 tablet, 1 tablet, Oral, Daily, Day, Lucila ZIMMERMAN, APRN, 1 tablet at 07/06/25 2002    nitroglycerin (NITROSTAT)  SL tablet 0.4 mg, 0.4 mg, Sublingual, Q5 Min PRN, Bryan De La Garza DO    pantoprazole (PROTONIX) injection 40 mg, 40 mg, Intravenous, Q AM, Day, Lucila ZIMMERMAN APRN, 40 mg at 07/07/25 0505    [COMPLETED] PHENobarbital injection 65 mg, 65 mg, Intravenous, Once, 65 mg at 07/07/25 0504 **FOLLOWED BY** PHENobarbital injection 65 mg, 65 mg, Intravenous, Once **FOLLOWED BY** [START ON 7/8/2025] PHENobarbital tablet 32.4 mg, 32.4 mg, Oral, Once **FOLLOWED BY** [START ON 7/8/2025] PHENobarbital tablet 32.4 mg, 32.4 mg, Oral, Once **FOLLOWED BY** [START ON 7/9/2025] PHENobarbital tablet 32.4 mg, 32.4 mg, Oral, Once, Day, Lucila ZIMMERMAN, APRN    Phosphorus Replacement - Follow Nurse / BPA Driven Protocol, , Not Applicable, PRN, Bryan De La Garza DO    potassium chloride (KLOR-CON M20) CR tablet 40 mEq, 40 mEq, Oral, Q4H, Bryan De La Garza DO, 40 mEq at 07/07/25 0400    Potassium Replacement - Follow Nurse / BPA Driven Protocol, , Not Applicable, PRHolli CARROLL Christopher L, DO    prednisoLONE sodium phosphate (ORAPRED) 15 MG/5ML oral solution 40 mg, 40 mg, Oral, Daily, Caitlin Alvarado APRN, 40 mg at 07/06/25 1205    riFAXIMin (XIFAXAN) tablet 550 mg, 550 mg, Oral, Q12H, Caitlin Alvarado APRN, 550 mg at 07/06/25 2002    sodium chloride 0.9 % flush 10 mL, 10 mL, Intravenous, Q12H, Ann Kearns APRN, 10 mL at 07/06/25 2000    sodium chloride 0.9 % flush 10 mL, 10 mL, Intravenous, PRN, Ann Kearns APRN, 10 mL at 07/06/25 0808    sodium chloride 0.9 % infusion 40 mL, 40 mL, Intravenous, PRN, Ann Kearns APRCARLY    thiamine (B-1) 500 mg in sodium chloride 0.9 % 100 mL IVPB, 500 mg, Intravenous, Q8H, Last Rate: 200 mL/hr at 07/07/25 0505, 500 mg at 07/07/25 0505 **FOLLOWED BY** [START ON 7/9/2025] thiamine (B-1) injection 200 mg, 200 mg, Intravenous, Q8H **FOLLOWED BY** [START ON 7/13/2025] thiamine (VITAMIN B-1) tablet 100 mg, 100 mg, Oral, Daily, Day, RYAN Jovel    zinc sulfate (ZINCATE) capsule 220  "mg, 220 mg, Oral, Daily, Caitlin Alvarado, APRN, 220 mg at 07/06/25 1203      Objective     Vital Signs  Vitals:    07/07/25 0700 07/07/25 0715 07/07/25 0730 07/07/25 0800   BP: 97/56 101/52 97/53 95/53   BP Location:       Patient Position:       Pulse: 86 85 83 85   Resp:       Temp:    97.8 °F (36.6 °C)   TempSrc:    Oral   SpO2: 100% 100% 100% 100%   Weight:       Height:           Physical Exam:     General Appearance:  Sleepy but able to arouse with verbal stimuli, in no acute distress   Head:    Normocephalic, without obvious abnormality   Eyes:          Conjunctivae normal, anicteric sclera       Neck:   supple, no JVD   Lungs:     respirations regular, even and unlabored, 3 L nasal cannula   Abdomen:     Soft, non-tender, no rebound or guarding, non-distended       Extremities:   No edema, no cyanosis   Skin:   No bruising or rash, + jaundice        Results Review:    Results from last 7 days   Lab Units 07/07/25  0522 07/06/25  0620   WBC 10*3/mm3 9.46 12.54*   HEMOGLOBIN g/dL 12.1* 13.2   PLATELETS 10*3/mm3 75* 66*     Results from last 7 days   Lab Units 07/07/25  0522 07/06/25  1936 07/06/25  1404 07/06/25  0620   SODIUM mmol/L 138  --  133* 134*   POTASSIUM mmol/L 3.6 3.0* 3.0* 3.3*   CHLORIDE mmol/L 107  --  99 97*   CO2 mmol/L 18.3*  --  19.7* 18.5*   BUN mg/dL 28.3*  --  23.0* 23.6*   CREATININE mg/dL 0.93  --  1.10 1.66*   GLUCOSE mg/dL 84  --  85 90   ALBUMIN g/dL 3.1*  --   --  3.4*   BILIRUBIN mg/dL 12.6*  --   --  14.5*   ALK PHOS U/L 174*  --   --  213*   AST (SGOT) U/L 135*  --   --  121*   ALT (SGPT) U/L 56*  --   --  47*   INR  1.25*  --   --  1.36*   MAGNESIUM mg/dL 2.6  --  2.3 2.0   PHOSPHORUS mg/dL  --   --  2.5  --    LIPASE U/L  --   --   --  39   AMMONIA umol/L  --  37  --   --      Estimated Creatinine Clearance: 109.9 mL/min (by C-G formula based on SCr of 0.93 mg/dL).  No results found for: \"HGBA1C\"      Infection   Results from last 7 days   Lab Units 07/06/25  1404 "   PROCALCITONIN ng/mL 0.95*     UA    Results from last 7 days   Lab Units 07/06/25 1811   NITRITE UA  Positive*   WBC UA /HPF 6-10*   BACTERIA UA /HPF None Seen   SQUAM EPITHEL UA /HPF 3-6*     Microbiology Results (last 10 days)       Procedure Component Value - Date/Time    MRSA Screen, PCR (Inpatient) - Swab, Nares [[id number]]  (Normal) Collected: 07/06/25 1930    Lab Status: Final result Specimen: Swab from Nares Updated: 07/06/25 2131     MRSA PCR No MRSA Detected    Narrative:      The negative predictive value of this diagnostic test is high and should only be used to consider de-escalating anti-MRSA therapy. A positive result may indicate colonization with MRSA and must be correlated clinically.    S. Pneumo Ag Urine or CSF - Urine, Urine, Catheter In/Out [[id number]]  (Normal) Collected: 07/06/25 1811    Lab Status: Final result Specimen: Urine, Catheter In/Out Updated: 07/06/25 1849     Strep Pneumo Ag Negative    Legionella Antigen, Urine - Urine, Urine, Catheter In/Out [[id number]]  (Normal) Collected: 07/06/25 1811    Lab Status: Final result Specimen: Urine, Catheter In/Out Updated: 07/06/25 1849     LEGIONELLA ANTIGEN, URINE Negative          Imaging Results (Last 72 Hours)       Procedure Component Value Units Date/Time    CT Head Without Contrast [[id number]] Collected: 07/06/25 1830     Updated: 07/06/25 1837    Narrative:      CT HEAD WO CONTRAST    Date of Exam: 7/6/2025 6:23 PM EDT    Indication: acute encephalopathy.    Comparison: None available.    Technique: Axial CT images were obtained of the head without contrast administration.  Coronal reconstructions were performed.  Automated exposure control and iterative reconstruction methods were used.        FINDINGS:    Brain/Ventricles: There is no acute intracranial hemorrhage, mass effect or midline shift. No abnormal extra-axial fluid collection.  The gray-white differentiation is maintained without evidence of an acute infarct.  There is no  evidence of   hydrocephalus    Orbits: The visualized portion of the orbits demonstrate no acute abnormality.    Incidental note of the divergent gaze    Sinuses:The visualized paranasal sinuses and mastoid air cells demonstrate no acute abnormality.    Soft Tissues/Skull: No acute abnormality of the visualized skull or soft tissues.      Impression:      No acute intracranial abnormality.      Electronically Signed: Rashaad Joel MD    7/6/2025 6:35 PM EDT    Workstation ID: [id number]    XR Chest 1 View [[id number]] Collected: 07/06/25 1822     Updated: 07/06/25 1825    Narrative:      XR CHEST 1 VW    Date of Exam: 7/6/2025 6:00 PM EDT    Indication: Leukocytosis    Comparison: None available.    Findings:  The right hemidiaphragm is slightly elevated. There are bandlike right perihilar and medial basilar densities representing scarring versus subsegmental atelectasis. The remaining lungs and pleural spaces are clear. The heart size is normal. The pulmonary   vascular markings are normal. There is mild degenerative spondylosis in the thoracic spine.      Impression:      Impression:  1.Slightly elevated right hemidiaphragm.  2.Bandlike right perihilar and medial basilar scarring versus subsegmental atelectasis.        Electronically Signed: Roderick Melton MD    7/6/2025 6:23 PM EDT    Workstation ID: [id number]    US Liver [[id number]] Collected: 07/06/25 1624     Updated: 07/06/25 1627    Narrative:      US LIVER    Date of Exam: 7/6/2025 3:06 PM EDT    Indication: elevated LFT's, check for cirrhosis, ascites, HCC.    Comparison: No comparisons available.    Technique: Grayscale and color Doppler ultrasound evaluation of the liver was performed.      Findings:  Diffuse increased hepatic echogenicity. Fairly homogeneous hepatic echotexture. Liver is enlarged measuring up to 20 cm in length. No solid appearing mass. No visualized ascites. Hepatopetal flow of main portal vein. No intrahepatic duct dilation. Common   bile  duct measures up to 4 mm normal for age.      Impression:      Impression:  1. Hepatomegaly with likely mild to moderate severity hepatic steatosis.  2. No sonographic evidence of hepatocellular carcinoma.  3. No visualized ascites. Hepatopetal flow of main portal vein.        Electronically Signed: Trevor Marks MD    7/6/2025 4:25 PM EDT    Workstation ID: [id number]            Assessment & Plan     Patient admitted into the hospital 7/6/2025.  GI was consulted for elevated bilirubin.  History of alcohol abuse with withdrawal.    ASSESSMENT:  Alcohol hepatitis  Elevated LFTs  Alcohol withdrawal  Electrolyte abnormalities  Thrombocytopenia        PLAN:  -[employer] discriminant function 38 survival.  Patient started on prednisolone for alcoholic hepatitis 7/6/2025.  Calculate Lille score on day 7 (7/12/2025) or consider calculating early on day 4 (7/9/2025).  - Acute hepatitis panel negative.  - Hemoglobin 12.1 from 13.2, likely hemodilutional.  No overt GI bleeding noted.  Continue to monitor H&H and transfuse as needed for hemoglobin less than 7.  - LFTs slightly increasing with total bili slightly improving.  AST/ALT ratio consistent with alcohol hepatitis.  Alk phos 174 from 213,  from 121, ALT 56 from 47, total bilirubin 12.6 from 14.5.  - RUQ US-hepatomegaly with likely mild to moderate severe  -Continue CIWA protocol.  -Continue regular/high-protein diet as tolerated  -Continue thiamine, folic acid and zinc due to alcohol abuse.   -Continue lactulose and Xifaxan.  - Supportive care.    Electronically signed by RYAN Camejo, 07/07/25, 9:33 AM EDT.           Electronically signed by Garrett Patel MD at 07/07/25 1516          Consult Notes (last 72 hours)        Shaina Quiñones DNP, APRCARLY at 07/06/25 1337        Consult Orders    1. Inpatient Psychiatrist Consult [[id number]] ordered by Ann Kearns APRN at 07/06/25 1000              Attestation signed by Lulu Orona MD at  07/08/25 0913      I have reviewed the mid-level documentation, and agree with the documentation, medical decision making and treatment plan as outlined by the mid-level provider.    This document has been electronically signed by Lulu Orona MD  July 8, 2025 09:13 EDT                                                                     Referring Provider:     Ann Kearns APRN      Reason for Consultation: alcohol withdrawal     Chief complaint Pt was unable to voice pertinent complaint secondary to confusion    Subjective .     History of present illness:  The patient is a 35 y.o. male who was admitted secondary to history of alcohol use and acute alcohol withdrawal     Psychiatry consulted secondary to alcohol withdrawal.   Pt is pleasantly confused- Oriented to self, believes he is in [facility].   not agitated -Not aggressive- no threats.  Reports daily alcohol use -drinking 10-15 beers per day.The pt is a limited historian secondary to confusion   RN reported CIWA with phenobarbital and BZD - reported as effective for managing sxs of intermittent agitation and confusion. No threatening behaviors- pt no longer requiring restraints for agitation      The following portions of the patient's history were reviewed and updated as appropriate: allergies, current medications, past family history, past medical history, past social history, past surgical history and problem list.    History    Objective     Vital Signs   /74 (BP Location: Right arm, Patient Position: Lying)   Pulse 116   Temp 97.3 °F (36.3 °C) (Oral)   Resp 23   Wt 77.5 kg (170 lb 13.7 oz)   SpO2 96%       Mental Status Exam:   Hygiene:   fair  Cooperation:  attempting to be cooperative   Eye Contact:  Fair  Psychomotor Behavior:  Slow  Affect:  Appropriate  Mood: pleasant   Speech:  Minimal  Thought Process:  Unable to demonstrate  Thought Content:  Unable to demonstrate  Suicidal:  None  Homicidal:  None  Hallucinations:   Not demonstrated today  Delusion:  Unable to demonstrate  Memory:  Deficits  Orientation:  Person, vaguely to state   Reliability:  poor  Insight:  Poor  Judgement:  Impaired  Impulse Control:  Fair            Assessment & Plan       * No active hospital problems. *       Assessment: Alcohol dependence, and withdrawal   Treatment Plan: The pt presents with alcohol dependence and withdrawal.   Continue CIWA with phenobarbital per hospitalist. Will evaluate cessation and appropriate treatment options when pt is able to participate in discussion -SW following    Will follow   Treatment Plan discussed with: Patient      I discussed the patients findings and my recommendations with patient and nursing staff    I have reviewed and approved the behavioral health treatment plans and problem list. Yes  Thank you for the consult   Referring MD has access to consult report and progress notes in EMR     Shaina Quiñones DNP, APRN  07/06/25  13:37 EDT             Electronically signed by Lulu Orona MD at 07/08/25 0979

## 2025-07-09 NOTE — THERAPY EVALUATION
Patient Name: Colt Luciano  : 1990    MRN: [id number]                              Today's Date: 2025       Admit Date: 2025    Visit Dx: No diagnosis found.  Patient Active Problem List   Diagnosis    Alcohol withdrawal delirium     History reviewed. No pertinent past medical history.  History reviewed. No pertinent surgical history.   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  -       Row Name 25 1300          Living Environment    Current Living Arrangements apartment  -     People in Home friend(s);other (see comments)  shares home w/ 2 friends.  -       Row Name 25 1305          Home Main Entrance    Number of Stairs, Main Entrance two  -CM     Stair Railings, Main Entrance railings safe and in good condition  -       Row Name 25 1305          Stairs Within Home, Primary    Stairs, Within Home, Primary single level function in home  -CM     Number of Stairs, Within Home, Primary none  -       Row Name 25 1300          Cognition    Orientation Status (Cognition) oriented x 4;other (see comments)  mild language barrier; pt reports he speaks English and does not need , but had difficulty understanding instructions at times unless given in basic [place]  -       Row Name 25 1300          Safety Issues/Impairments Affecting Functional Mobility    Safety Issues Affecting Function (Mobility) impulsivity;insight into deficits/self-awareness;problem-solving;safety  precaution awareness;safety precautions follow-through/compliance;judgment;at risk behavior observed  -CM     Impairments Affecting Function (Mobility) balance;endurance/activity tolerance;motor planning;postural/trunk control  -CM               User Key  (r) = Recorded By, (t) = Taken By, (c) = Cosigned By      Initials Name Provider Type    Rebekah Odom, PT Physical Therapist                   Mobility       Row Name 07/09/25 1305 07/09/25 1304       Bed Mobility    Bed Mobility -- supine-sit  -CM    Supine-Sit Rankin (Bed Mobility) minimum assist (75% patient effort)  -CM --    Assistive Device (Bed Mobility) head of bed elevated  -CM --      Row Name 07/09/25 1305          Sit-Stand Transfer    Sit-Stand Rankin (Transfers) minimum assist (75% patient effort);moderate assist (50% patient effort);2 person assist  -CM     Assistive Device (Sit-Stand Transfers) walker, front-wheeled  -CM       Row Name 07/09/25 1305          Gait/Stairs (Locomotion)    Rankin Level (Gait) moderate assist (50% patient effort);2 person assist;maximum assist (25% patient effort)  -CM     Assistive Device (Gait) walker, front-wheeled  -CM     Patient was able to Ambulate yes  -CM     Distance in Feet (Gait) 8  extremely ataxic w/ short step length, marked lateral sway of upper body.  -CM     Deviations/Abnormal Patterns (Gait) ataxic  -CM               User Key  (r) = Recorded By, (t) = Taken By, (c) = Cosigned By      Initials Name Provider Type    Rebekah Odom, PT Physical Therapist                   Obj/Interventions       Row Name 07/09/25 1306          Range of Motion Comprehensive    General Range of Motion bilateral lower extremity ROM WFL  -CM       Row Name 07/09/25 1306          Strength Comprehensive (MMT)    General Manual Muscle Testing (MMT) Assessment no strength deficits identified  -CM       Row Name 07/09/25 1306          Balance    Balance Assessment sitting static balance;sitting  dynamic balance;standing static balance;standing dynamic balance  -CM     Static Sitting Balance standby assist  -CM     Dynamic Sitting Balance standby assist;contact guard  -CM     Position, Sitting Balance unsupported;sitting edge of bed  -CM     Static Standing Balance moderate assist  -CM     Dynamic Standing Balance moderate assist;maximum assist  -CM     Position/Device Used, Standing Balance supported;walker, rolling  -CM       Row Name 07/09/25 1306          Sensory Assessment (Somatosensory)    Sensory Assessment (Somatosensory) unable/difficult to assess  -CM               User Key  (r) = Recorded By, (t) = Taken By, (c) = Cosigned By      Initials Name Provider Type    Rebekah Odom, PT Physical Therapist                   Goals/Plan       Row Name 07/09/25 1315          Bed Mobility Goal 1 (PT)    Activity/Assistive Device (Bed Mobility Goal 1, PT) bed mobility activities, all  -CM     [place] Level/Cues Needed (Bed Mobility Goal 1, PT) independent  -CM     Time Frame (Bed Mobility Goal 1, PT) 2 weeks  -CM       Row Name 07/09/25 1315          Transfer Goal 1 (PT)    Activity/Assistive Device (Transfer Goal 1, PT) transfers, all  -CM     [place] Level/Cues Needed (Transfer Goal 1, PT) independent  -CM     Time Frame (Transfer Goal 1, PT) 2 weeks  -CM       Row Name 07/09/25 1315          Gait Training Goal 1 (PT)    Activity/Assistive Device (Gait Training Goal 1, PT) gait (walking locomotion);assistive device use  -CM     [place] Level (Gait Training Goal 1, PT) modified independence  -CM     Distance (Gait Training Goal 1, PT) 250 ft, no loss of balance.  -CM     Time Frame (Gait Training Goal 1, PT) 2 weeks  -CM       Row Name 07/09/25 1315          Stairs Goal 1 (PT)    Activity/Assistive Device (Stairs Goal 1, PT) stairs, all skills  -CM     [place] Level/Cues Needed (Stairs Goal 1, PT) supervision required  -CM     Number of Stairs (Stairs Goal 1, PT) 3  -CM     Time  Frame (Stairs Goal 1, PT) 2 weeks  -CM       Row Name 07/09/25 1315          Therapy Assessment/Plan (PT)    Planned Therapy Interventions (PT) balance training;bed mobility training;gait training;home exercise program;postural re-education;transfer training;patient/family education;stair training;neuromuscular re-education;ROM (range of motion);motor coordination training  -CM               User Key  (r) = Recorded By, (t) = Taken By, (c) = Cosigned By      Initials Name Provider Type    CM Rebekah Mcintosh, PT Physical Therapist                   Clinical Impression       Row Name 07/09/25 1307          Pain    Pretreatment Pain Rating 0/10 - no pain  -CM     Posttreatment Pain Rating 0/10 - no pain  -CM     Pain Management Interventions exercise or physical activity utilized  -CM     Response to Pain Interventions activity level improved;functional ability improved;activity participation with tolerable pain  -CM       Row Name 07/09/25 1307          Plan of Care Review    Plan of Care Reviewed With patient  -CM     Outcome Evaluation 34 yo male adm 7/6/25 for etoh withdrawal. Also dx w/ ORLY, acute liver injury, hyperammonia, metabolic encephalopathy. CT abdomen revealed evolving liver cirrhosis. Pt had stopped drinking on his own 4 days prior to admission. Normally drinks 12-15 beers daily. No significant PMH known. Today, pt is alert and agreeable to PT. Says he speaks English well and does not need , but pt had difficulty seeming to understand some questions until simple [place] instructions used. This may have been due to confusion vs. language barrier, but may be helpful to have ipad  at future sessions. Comes to sit w/ cga/sba. However, requires mod assist x 2 and rw to come to stand. Then requires mod to max assist of 2 to amb w/ rw x 8 ft. Pt's gait markedly ataxic and tremulous. Pt very unsafe for home at this time. Recommend SNF at d/c, pending progress. If able to improve  adequately, would benefit from OP psych rehab. Will follow.  -CM       Row Name 07/09/25 1314          Therapy Assessment/Plan (PT)    Rehab Potential (PT) good  -CM     Criteria for Skilled Interventions Met (PT) yes;meets criteria;skilled treatment is necessary  -CM     Therapy Frequency (PT) 5 times/wk  -CM     Predicted Duration of Therapy Intervention (PT) until d/c  -CM       Row Name 07/09/25 1314          Vital Signs    O2 Delivery Pre Treatment room air  -CM     O2 Delivery Intra Treatment room air  -CM     O2 Delivery Post Treatment room air  -CM     Recovery Time VSS  -CM       Row Name 07/09/25 1314          Positioning and Restraints    Pre-Treatment Position in bed  -CM     Post Treatment Position chair  -CM     In Chair notified nsg;sitting;call light within reach;encouraged to call for assist;exit alarm on  -CM               User Key  (r) = Recorded By, (t) = Taken By, (c) = Cosigned By      Initials Name Provider Type    Rebekah Odom, PT Physical Therapist                   Outcome Measures       Row Name 07/09/25 1316 07/09/25 0800       How much help from another person do you currently need...    Turning from your back to your side while in flat bed without using bedrails? 4  -CM 4  -TD    Moving from lying on back to sitting on the side of a flat bed without bedrails? 3  -CM 4  -TD    Moving to and from a bed to a chair (including a wheelchair)? 2  -CM 3  -TD    Standing up from a chair using your arms (e.g., wheelchair, bedside chair)? 2  -CM 3  -TD    Climbing 3-5 steps with a railing? 1  -CM 2  -TD    To walk in hospital room? 2  -CM 2  -TD    AM-PAC 6 Clicks Score (PT) 14  -CM 18  -TD              User Key  (r) = Recorded By, (t) = Taken By, (c) = Cosigned By      Initials Name Provider Type    Rebekah Odom, PT Physical Therapist    Corazon Greenberg, RN Registered Nurse                                 Physical Therapy Education       Title: PT OT SLP Therapies (Done)        Topic: Physical Therapy (Done)       Point: Mobility training (Done)       Learning Progress Summary            Patient Acceptance, E,TB,D, VU,NR by CM at 7/9/2025 1316                      Point: Home exercise program (Done)       Learning Progress Summary            Patient Acceptance, E,TB,D, VU,NR by CM at 7/9/2025 1316                      Point: Body mechanics (Done)       Learning Progress Summary            Patient Acceptance, E,TB,D, VU,NR by CM at 7/9/2025 1316                      Point: Precautions (Done)       Learning Progress Summary            Patient Acceptance, E,TB,D, VU,NR by CM at 7/9/2025 1316                                      User Key       Initials Effective Dates Name Provider Type Discipline     06/16/21 -  Rebekah Mcintosh, PT Physical Therapist PT                  PT Recommendation and Plan  Planned Therapy Interventions (PT): balance training, bed mobility training, gait training, home exercise program, postural re-education, transfer training, patient/family education, stair training, neuromuscular re-education, ROM (range of motion), motor coordination training  Outcome Evaluation: 36 yo male adm 7/6/25 for etoh withdrawal. Also dx w/ ORLY, acute liver injury, hyperammonia, metabolic encephalopathy. CT abdomen revealed evolving liver cirrhosis. Pt had stopped drinking on his own 4 days prior to admission. Normally drinks 12-15 beers daily. No significant PMH known. Today, pt is alert and agreeable to PT. Says he speaks English well and does not need , but pt had difficulty seeming to understand some questions until simple [place] instructions used. This may have been due to confusion vs. language barrier, but may be helpful to have ipad  at future sessions. Comes to sit w/ cga/sba. However, requires mod assist x 2 and rw to come to stand. Then requires mod to max assist of 2 to amb w/ rw x 8 ft. Pt's gait markedly ataxic and tremulous. Pt very unsafe for home  at this time. Recommend SNF at d/c, pending progress. If able to improve adequately, would benefit from OP psych rehab. Will follow.     Time Calculation:   PT Evaluation Complexity  Clinical Decision Making (PT Evaluation Complexity): moderate complexity     PT Charges       Row Name 07/09/25 1319             Time Calculation    Start Time 0948  -CM      Stop Time 1008  -CM      Time Calculation (min) 20 min  -CM      PT Received On 07/09/25  -CM      PT - Next Appointment 07/10/25  -CM      PT Goal Re-Cert Due Date 07/23/25  -CM         Time Calculation- PT    Total Timed Code Minutes- PT 0 minute(s)  -CM                User Key  (r) = Recorded By, (t) = Taken By, (c) = Cosigned By      Initials Name Provider Type    Rebekah Odom, PT Physical Therapist                  Therapy Charges for Today       Code Description Service Date Service Provider Modifiers Qty    [id number] HC PT EVAL MOD COMPLEXITY 4 7/9/2025 Rebekah Mcintosh, PT GP 1            PT G-Codes  AM-PAC 6 Clicks Score (PT): 14  PT Discharge Summary  Anticipated Discharge Disposition (PT): skilled nursing facility, other (see comments) (pending progress)    Rebekah Mcintosh, PT  7/9/2025

## 2025-07-09 NOTE — THERAPY EVALUATION
Patient Name: Colt Luciano  : 1990    MRN: [id number]                              Today's Date: 2025       Admit Date: 2025    Visit Dx: No diagnosis found.  Patient Active Problem List   Diagnosis    Alcohol withdrawal delirium     History reviewed. No pertinent past medical history.  History reviewed. No pertinent surgical history.   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  -       Row Name 25 1300          Living Environment    Current Living Arrangements apartment  -     People in Home friend(s);other (see comments)  shares home w/ 2 friends.  -       Row Name 25 1305          Home Main Entrance    Number of Stairs, Main Entrance two  -CM     Stair Railings, Main Entrance railings safe and in good condition  -       Row Name 25 1305          Stairs Within Home, Primary    Stairs, Within Home, Primary single level function in home  -CM     Number of Stairs, Within Home, Primary none  -       Row Name 25 1300          Cognition    Orientation Status (Cognition) oriented x 4;other (see comments)  mild language barrier; pt reports he speaks English and does not need , but had difficulty understanding instructions at times unless given in basic [place]ian  -       Row Name 25 1300          Safety Issues/Impairments Affecting Functional Mobility    Safety Issues Affecting Function (Mobility) impulsivity;insight into deficits/self-awareness;problem-solving;safety  precaution awareness;safety precautions follow-through/compliance;judgment;at risk behavior observed  -CM     Impairments Affecting Function (Mobility) balance;endurance/activity tolerance;motor planning;postural/trunk control  -CM               User Key  (r) = Recorded By, (t) = Taken By, (c) = Cosigned By      Initials Name Provider Type    Rebekah Odom, PT Physical Therapist                   Mobility       Row Name 07/09/25 1305 07/09/25 1304       Bed Mobility    Bed Mobility -- supine-sit  -CM    Supine-Sit [place] (Bed Mobility) minimum assist (75% patient effort)  -CM --    Assistive Device (Bed Mobility) head of bed elevated  -CM --      Row Name 07/09/25 1305          Sit-Stand Transfer    Sit-Stand [place] (Transfers) minimum assist (75% patient effort);moderate assist (50% patient effort);2 person assist  -CM     Assistive Device (Sit-Stand Transfers) walker, front-wheeled  -CM       Row Name 07/09/25 1305          Gait/Stairs (Locomotion)    [place] Level (Gait) moderate assist (50% patient effort);2 person assist;maximum assist (25% patient effort)  -CM     Assistive Device (Gait) walker, front-wheeled  -CM     Patient was able to Ambulate yes  -CM     Distance in Feet (Gait) 8  extremely ataxic w/ short step length, marked lateral sway of upper body.  -CM     Deviations/Abnormal Patterns (Gait) ataxic  -CM               User Key  (r) = Recorded By, (t) = Taken By, (c) = Cosigned By      Initials Name Provider Type    Rebekah Odom, PT Physical Therapist                   Obj/Interventions       Row Name 07/09/25 1306          Range of Motion Comprehensive    General Range of Motion bilateral lower extremity ROM WFL  -CM       Row Name 07/09/25 1306          Strength Comprehensive (MMT)    General Manual Muscle Testing (MMT) Assessment no strength deficits identified  -CM       Row Name 07/09/25 1306          Balance    Balance Assessment sitting static balance;sitting  dynamic balance;standing static balance;standing dynamic balance  -CM     Static Sitting Balance standby assist  -CM     Dynamic Sitting Balance standby assist;contact guard  -CM     Position, Sitting Balance unsupported;sitting edge of bed  -CM     Static Standing Balance moderate assist  -CM     Dynamic Standing Balance moderate assist;maximum assist  -CM     Position/Device Used, Standing Balance supported;walker, rolling  -CM       Row Name 07/09/25 1306          Sensory Assessment (Somatosensory)    Sensory Assessment (Somatosensory) unable/difficult to assess  -CM               User Key  (r) = Recorded By, (t) = Taken By, (c) = Cosigned By      Initials Name Provider Type    Rebekah Odom, PT Physical Therapist                   Goals/Plan       Row Name 07/09/25 1315          Bed Mobility Goal 1 (PT)    Activity/Assistive Device (Bed Mobility Goal 1, PT) bed mobility activities, all  -CM     [place] Level/Cues Needed (Bed Mobility Goal 1, PT) independent  -CM     Time Frame (Bed Mobility Goal 1, PT) 2 weeks  -CM       Row Name 07/09/25 1315          Transfer Goal 1 (PT)    Activity/Assistive Device (Transfer Goal 1, PT) transfers, all  -CM     [place] Level/Cues Needed (Transfer Goal 1, PT) independent  -CM     Time Frame (Transfer Goal 1, PT) 2 weeks  -CM       Row Name 07/09/25 1315          Gait Training Goal 1 (PT)    Activity/Assistive Device (Gait Training Goal 1, PT) gait (walking locomotion);assistive device use  -CM     [place] Level (Gait Training Goal 1, PT) modified independence  -CM     Distance (Gait Training Goal 1, PT) 250 ft, no loss of balance.  -CM     Time Frame (Gait Training Goal 1, PT) 2 weeks  -CM       Row Name 07/09/25 1315          Stairs Goal 1 (PT)    Activity/Assistive Device (Stairs Goal 1, PT) stairs, all skills  -CM     [place] Level/Cues Needed (Stairs Goal 1, PT) supervision required  -CM     Number of Stairs (Stairs Goal 1, PT) 3  -CM     Time  Frame (Stairs Goal 1, PT) 2 weeks  -CM       Row Name 07/09/25 1315          Therapy Assessment/Plan (PT)    Planned Therapy Interventions (PT) balance training;bed mobility training;gait training;home exercise program;postural re-education;transfer training;patient/family education;stair training;neuromuscular re-education;ROM (range of motion);motor coordination training  -CM               User Key  (r) = Recorded By, (t) = Taken By, (c) = Cosigned By      Initials Name Provider Type    CM Rebekah Mcintosh, PT Physical Therapist                   Clinical Impression       Row Name 07/09/25 1307          Pain    Pretreatment Pain Rating 0/10 - no pain  -CM     Posttreatment Pain Rating 0/10 - no pain  -CM     Pain Management Interventions exercise or physical activity utilized  -CM     Response to Pain Interventions activity level improved;functional ability improved;activity participation with tolerable pain  -CM       Row Name 07/09/25 1307          Plan of Care Review    Plan of Care Reviewed With patient  -CM     Outcome Evaluation 36 yo male adm 7/6/25 for etoh withdrawal. Also dx w/ ORLY, acute liver injury, hyperammonia, metabolic encephalopathy. CT abdomen revealed evolving liver cirrhosis. Pt had stopped drinking on his own 4 days prior to admission. Normally drinks 12-15 beers daily. No significant PMH known. Today, pt is alert and agreeable to PT. Says he speaks English well and does not need , but pt had difficulty seeming to understand some questions until simple Finnish instructions used. This may have been due to confusion vs. language barrier, but may be helpful to have ipad  at future sessions. Comes to sit w/ cga/sba. However, requires mod assist x 2 and rw to come to stand. Then requires mod to max assist of 2 to amb w/ rw x 8 ft. Pt's gait markedly ataxic and tremulous. Pt very unsafe for home at this time. Recommend SNF at d/c, pending progress. If able to improve  adequately, would benefit from OP psych rehab. Will follow.  -CM       Row Name 07/09/25 1314          Therapy Assessment/Plan (PT)    Rehab Potential (PT) good  -CM     Criteria for Skilled Interventions Met (PT) yes;meets criteria;skilled treatment is necessary  -CM     Therapy Frequency (PT) 5 times/wk  -CM     Predicted Duration of Therapy Intervention (PT) until d/c  -CM       Row Name 07/09/25 1314          Vital Signs    O2 Delivery Pre Treatment room air  -CM     O2 Delivery Intra Treatment room air  -CM     O2 Delivery Post Treatment room air  -CM     Recovery Time VSS  -CM       Row Name 07/09/25 1314          Positioning and Restraints    Pre-Treatment Position in bed  -CM     Post Treatment Position chair  -CM     In Chair notified nsg;sitting;call light within reach;encouraged to call for assist;exit alarm on  -CM               User Key  (r) = Recorded By, (t) = Taken By, (c) = Cosigned By      Initials Name Provider Type    Rebekah dOom, PT Physical Therapist                   Outcome Measures       Row Name 07/09/25 1316 07/09/25 0800       How much help from another person do you currently need...    Turning from your back to your side while in flat bed without using bedrails? 4  -CM 4  -TD    Moving from lying on back to sitting on the side of a flat bed without bedrails? 3  -CM 4  -TD    Moving to and from a bed to a chair (including a wheelchair)? 2  -CM 3  -TD    Standing up from a chair using your arms (e.g., wheelchair, bedside chair)? 2  -CM 3  -TD    Climbing 3-5 steps with a railing? 1  -CM 2  -TD    To walk in hospital room? 2  -CM 2  -TD    AM-PAC 6 Clicks Score (PT) 14  -CM 18  -TD              User Key  (r) = Recorded By, (t) = Taken By, (c) = Cosigned By      Initials Name Provider Type    Rebekah Odom, PT Physical Therapist    Corazon Greenberg, RN Registered Nurse                                 Physical Therapy Education       Title: PT OT SLP Therapies (Done)        Topic: Physical Therapy (Done)       Point: Mobility training (Done)       Learning Progress Summary            Patient Acceptance, E,TB,D, VU,NR by CM at 7/9/2025 1316                      Point: Home exercise program (Done)       Learning Progress Summary            Patient Acceptance, E,TB,D, VU,NR by CM at 7/9/2025 1316                      Point: Body mechanics (Done)       Learning Progress Summary            Patient Acceptance, E,TB,D, VU,NR by CM at 7/9/2025 1316                      Point: Precautions (Done)       Learning Progress Summary            Patient Acceptance, E,TB,D, VU,NR by CM at 7/9/2025 1316                                      User Key       Initials Effective Dates Name Provider Type Discipline     06/16/21 -  Rebekah Mcintosh, PT Physical Therapist PT                  PT Recommendation and Plan  Planned Therapy Interventions (PT): balance training, bed mobility training, gait training, home exercise program, postural re-education, transfer training, patient/family education, stair training, neuromuscular re-education, ROM (range of motion), motor coordination training  Outcome Evaluation: 36 yo male adm 7/6/25 for etoh withdrawal. Also dx w/ ORLY, acute liver injury, hyperammonia, metabolic encephalopathy. CT abdomen revealed evolving liver cirrhosis. Pt had stopped drinking on his own 4 days prior to admission. Normally drinks 12-15 beers daily. No significant PMH known. Today, pt is alert and agreeable to PT. Says he speaks English well and does not need , but pt had difficulty seeming to understand some questions until simple Vietnamese instructions used. This may have been due to confusion vs. language barrier, but may be helpful to have ipad  at future sessions. Comes to sit w/ cga/sba. However, requires mod assist x 2 and rw to come to stand. Then requires mod to max assist of 2 to amb w/ rw x 8 ft. Pt's gait markedly ataxic and tremulous. Pt very unsafe for home  at this time. Recommend SNF at d/c, pending progress. If able to improve adequately, would benefit from OP psych rehab. Will follow.     Time Calculation:   PT Evaluation Complexity  Clinical Decision Making (PT Evaluation Complexity): moderate complexity     PT Charges       Row Name 07/09/25 1319             Time Calculation    Start Time 0948  -CM      Stop Time 1008  -CM      Time Calculation (min) 20 min  -CM      PT Received On 07/09/25  -CM      PT - Next Appointment 07/10/25  -CM      PT Goal Re-Cert Due Date 07/23/25  -CM         Time Calculation- PT    Total Timed Code Minutes- PT 0 minute(s)  -CM                User Key  (r) = Recorded By, (t) = Taken By, (c) = Cosigned By      Initials Name Provider Type    Rebekah Odom, PT Physical Therapist                  Therapy Charges for Today       Code Description Service Date Service Provider Modifiers Qty    64313576919 HC PT EVAL MOD COMPLEXITY 4 7/9/2025 Rebekah Mcintosh, PT GP 1            PT G-Codes  AM-PAC 6 Clicks Score (PT): 14  PT Discharge Summary  Anticipated Discharge Disposition (PT): skilled nursing facility, other (see comments) (pending progress)    Rebekah Mcintosh, PT  7/9/2025

## 2025-07-09 NOTE — PLAN OF CARE
Goal Outcome Evaluation:  Plan of Care Reviewed With: patient        Progress: no change  Outcome Evaluation: 34 yo male adm 7/6/25 for etoh withdrawal. Also dx w/ ORLY, acute liver injury, hyperammonia, metabolic encephalopathy. CT abdomen revealed evolving liver cirrhosis. Pt had stopped drinking on his own 4 days prior to admission. Normally drinks 12-15 beers daily. No significant PMH known. Pt. states he speaks english, although he would benefit from . Pt. lives at home w/ friends and maintains I/ADL independence. Pt. provided min-mod A x 2 for SPS transfers this date, increased tremors w/ standing and multiple LOB. Pt. provided max A for all LB ADLs. Recommend SNF placement at d/c to address aforementioned deficits pending progress.    Anticipated Discharge Disposition (OT): skilled nursing facility

## 2025-07-09 NOTE — PROGRESS NOTES
LOS: 3 days   Patient Care Team:  Joyce Shannon APRN as PCP - General (Nurse Practitioner)      Subjective     Interval History:     Subjective: Patient has been weaned off Precedex and received x 1 dose of Ativan 1 mg overnight.  Patient is awake and oriented x 4 today.  Patient denies abdominal pain, nausea, or vomiting.  Tolerating regular diet.  Multiple brown liquid BMs noted overnight.      ROS:   No chest pain, shortness of breath, or cough.        Medication Review:     Current Facility-Administered Medications:     aluminum-magnesium hydroxide-simethicone (MAALOX MAX) 400-400-40 MG/5ML suspension 15 mL, 15 mL, Oral, Q6H PRN, Ann Kearns, APRN    sennosides-docusate (PERICOLACE) 8.6-50 MG per tablet 2 tablet, 2 tablet, Oral, BID PRN **AND** polyethylene glycol (MIRALAX) packet 17 g, 17 g, Oral, Daily PRN **AND** bisacodyl (DULCOLAX) EC tablet 5 mg, 5 mg, Oral, Daily PRN **AND** bisacodyl (DULCOLAX) suppository 10 mg, 10 mg, Rectal, Daily PRN, Ann Kearns, APRN    Calcium Replacement - Follow Nurse / BPA Driven Protocol, , Not Applicable, PRN, Bryan De La Garza,     enoxaparin sodium (LOVENOX) syringe 40 mg, 40 mg, Subcutaneous, Q24H, Tung Fernandez MD, 40 mg at 07/08/25 1530    famotidine (PEPCID) tablet 20 mg, 20 mg, Oral, BID AC, Jason Carpio MD, 20 mg at 07/09/25 0752    folic acid 1 mg in sodium chloride 0.9 % 50 mL IVPB, 1 mg, Intravenous, Daily, Day, Lucila G, APRN, Last Rate: 100 mL/hr at 07/09/25 0908, 1 mg at 07/09/25 0908    HYDROmorphone (DILAUDID) injection 0.5 mg, 0.5 mg, Intravenous, Q2H PRN, Angelique López APRN, 0.5 mg at 07/07/25 0255    lactulose (CHRONULAC) 10 GM/15ML solution 30 g, 30 g, Oral, TID, Christiano Caitlinmarsha Gordillo, APRN, 30 g at 07/09/25 0905    loperamide (IMODIUM) capsule 2 mg, 2 mg, Oral, 4x Daily PRN, Susan Wilhelm, EMMANUEL    LORazepam (ATIVAN) tablet 1 mg, 1 mg, Oral, Q1H PRN, 1 mg at 07/08/25 2208 **OR** midazolam (VERSED) injection 2 mg, 2 mg,  Intravenous, Q1H PRN **OR** LORazepam (ATIVAN) tablet 2 mg, 2 mg, Oral, Q1H PRN, 2 mg at 07/07/25 2027 **OR** midazolam (VERSED) injection 4 mg, 4 mg, Intravenous, Q1H PRN, 4 mg at 07/07/25 2329 **OR** midazolam (VERSED) injection 4 mg, 4 mg, Intravenous, Q15 Min PRN, 4 mg at 07/08/25 0022 **OR** midazolam (VERSED) injection 4 mg, 4 mg, Intramuscular, Q15 Min PRN, Susan Wilhelm PA-C    Magnesium Standard Dose Replacement - Follow Nurse / BPA Driven Protocol, , Not Applicable, PRN, Ann Kearns APRN    Magnesium Standard Dose Replacement - Follow Nurse / BPA Driven Protocol, , Not Applicable, PRN, Bryan De La Garza DO    multivitamin with minerals 1 tablet, 1 tablet, Oral, Daily, Day, Dawn G, APRN, 1 tablet at 07/09/25 0908    nitroglycerin (NITROSTAT) SL tablet 0.4 mg, 0.4 mg, Sublingual, Q5 Min PRN, Bryan De La Garza DO    [COMPLETED] PHENobarbital injection 65 mg, 65 mg, Intravenous, Once, 65 mg at 07/07/25 0504 **FOLLOWED BY** [COMPLETED] PHENobarbital injection 65 mg, 65 mg, Intravenous, Once, 65 mg at 07/07/25 1857 **FOLLOWED BY** PHENobarbital tablet 32.4 mg, 32.4 mg, Oral, Once **FOLLOWED BY** [COMPLETED] PHENobarbital tablet 32.4 mg, 32.4 mg, Oral, Once, 32.4 mg at 07/08/25 1747 **FOLLOWED BY** [COMPLETED] PHENobarbital tablet 32.4 mg, 32.4 mg, Oral, Once, Day, Dawn G, APRN, 32.4 mg at 07/09/25 0513    Phosphorus Replacement - Follow Nurse / BPA Driven Protocol, , Not Applicable, PRN, Bryan De La Garza, DO    potassium chloride (KLOR-CON M20) CR tablet 40 mEq, 40 mEq, Oral, Q4H, Tung Fernandez MD, 40 mEq at 07/09/25 0752    Potassium Replacement - Follow Nurse / BPA Driven Protocol, , Not Applicable, PRN, Bryan De La Garza DO    prednisoLONE sodium phosphate (ORAPRED) 15 MG/5ML oral solution 40 mg, 40 mg, Oral, Daily, Caitlin Alvarado APRN, 40 mg at 07/09/25 0907    riFAXIMin (XIFAXAN) tablet 550 mg, 550 mg, Oral, Q12H, Caitlin Alvarado APRN, 550 mg at 07/09/25 0908     sodium chloride 0.9 % flush 10 mL, 10 mL, Intravenous, Q12H, Achterberg, Ann, APRN, 10 mL at 07/09/25 0909    sodium chloride 0.9 % flush 10 mL, 10 mL, Intravenous, PRN, Achterberg, Ann, APRN, 10 mL at 07/06/25 0808    sodium chloride 0.9 % infusion 40 mL, 40 mL, Intravenous, PRN, Achterberg, Ann, APRN    [COMPLETED] thiamine (B-1) 500 mg in sodium chloride 0.9 % 100 mL IVPB, 500 mg, Intravenous, Q8H, Last Rate: 200 mL/hr at 07/09/25 0514, 500 mg at 07/09/25 0514 **FOLLOWED BY** thiamine (B-1) injection 200 mg, 200 mg, Intravenous, Q8H **FOLLOWED BY** [START ON 7/13/2025] thiamine (VITAMIN B-1) tablet 100 mg, 100 mg, Oral, Daily, Day, Lucila ZIMMERMAN APRN    zinc sulfate (ZINCATE) capsule 220 mg, 220 mg, Oral, Daily, Caitlin Alvarado Rand, APRN, 220 mg at 07/09/25 0908      Objective     Vital Signs  Vitals:    07/09/25 0500 07/09/25 0700 07/09/25 0800 07/09/25 0900   BP: 126/76 154/95 154/96 137/84   Pulse: 80 85 97 101   Resp:  25 25 27   Temp:   99.7 °F (37.6 °C)    TempSrc:   Oral    SpO2: 100% 100% 98% 100%   Weight:       Height:           Physical Exam:     General Appearance:    Awake and alert, in no acute distress, lying in bed   Head:    Normocephalic, without obvious abnormality   Eyes:          Conjunctivae normal, anicteric sclera       Neck:    no JVD   Lungs:     respirations regular, even and unlabored, room air   Abdomen:     Soft, non-tender, no rebound or guarding, non-distended       Extremities:   No edema, no cyanosis   Skin:   No bruising or rash, no jaundice        Results Review:    Results from last 7 days   Lab Units 07/09/25  0510 07/08/25  0509 07/07/25  0522 07/06/25  0620   WBC 10*3/mm3 8.54 9.47 9.46 12.54*   HEMOGLOBIN g/dL 12.0* 12.5* 12.1* 13.2   PLATELETS 10*3/mm3 126* 112* 75* 66*     Results from last 7 days   Lab Units 07/09/25  0510 07/09/25  0210 07/08/25  1749 07/08/25  1026 07/08/25  0509 07/07/25  0522 07/06/25  1936 07/06/25  1404 07/06/25  0620   SODIUM mmol/L 137  --   " --   --  143 138  --  133* 134*   POTASSIUM mmol/L 3.2*  --  3.9  --  3.4* 3.6 3.0* 3.0* 3.3*   CHLORIDE mmol/L 107  --   --   --  114* 107  --  99 97*   CO2 mmol/L 17.6*  --   --   --  18.2* 18.3*  --  19.7* 18.5*   BUN mg/dL 13.7  --   --   --  23.2* 28.3*  --  23.0* 23.6*   CREATININE mg/dL 0.59*  --   --   --  0.69* 0.93  --  1.10 1.66*   GLUCOSE mg/dL 89  --   --   --  137* 84  --  85 90   ALBUMIN g/dL 3.1*  --   --   --  3.2* 3.1*  --   --  3.4*   BILIRUBIN mg/dL 6.7*  --   --   --  8.1* 12.6*  --   --  14.5*   ALK PHOS U/L 176*  --   --   --  193* 174*  --   --  213*   AST (SGOT) U/L 99*  --   --   --  122* 135*  --   --  121*   ALT (SGPT) U/L 75*  --   --   --  71* 56*  --   --  47*   INR  1.18*  --   --   --  1.20* 1.25*  --   --  1.36*   MAGNESIUM mg/dL 1.7  --   --   --  2.4 2.6  --  2.3 2.0   PHOSPHORUS mg/dL 2.2* 1.9* 1.4*  --  2.0*  --   --  2.5  --    LIPASE U/L  --   --   --   --   --   --   --   --  39   AMMONIA umol/L  --   --   --  93*  --   --  37  --   --      Estimated Creatinine Clearance: 173.3 mL/min (A) (by C-G formula based on SCr of 0.59 mg/dL (L)).  No results found for: \"HGBA1C\"      Infection   Results from last 7 days   Lab Units 07/06/25 1944 07/06/25 1913 07/06/25  1811 07/06/25  1404   BLOODCX  No growth at 2 days No growth at 2 days  --   --    URINECX   --   --  No growth  --    PROCALCITONIN ng/mL  --   --   --  0.95*     UA    Results from last 7 days   Lab Units 07/06/25  1811   NITRITE UA  Positive*   WBC UA /HPF 6-10*   BACTERIA UA /HPF None Seen   SQUAM EPITHEL UA /HPF 3-6*   URINECX  No growth     Microbiology Results (last 10 days)       Procedure Component Value - Date/Time    Blood Culture - Blood, Arm, Left [[id number]]  (Normal) Collected: 07/06/25 1944    Lab Status: Preliminary result Specimen: Blood from Arm, Left Updated: 07/08/25 2000     Blood Culture No growth at 2 days    MRSA Screen, PCR (Inpatient) - Swab, Nares [[id number]]  (Normal) Collected: 07/06/25 " 1930    Lab Status: Final result Specimen: Swab from Nares Updated: 07/06/25 2131     MRSA PCR No MRSA Detected    Narrative:      The negative predictive value of this diagnostic test is high and should only be used to consider de-escalating anti-MRSA therapy. A positive result may indicate colonization with MRSA and must be correlated clinically.    Blood Culture - Blood, Arm, Right [[id number]]  (Normal) Collected: 07/06/25 1913    Lab Status: Preliminary result Specimen: Blood from Arm, Right Updated: 07/08/25 1931     Blood Culture No growth at 2 days    Narrative:      Less than seven (7) mL's of blood was collected.  Insufficient quantity may yield false negative results.    S. Pneumo Ag Urine or CSF - Urine, Urine, Catheter In/Out [[id number]]  (Normal) Collected: 07/06/25 1811    Lab Status: Final result Specimen: Urine, Catheter In/Out Updated: 07/06/25 1849     Strep Pneumo Ag Negative    Legionella Antigen, Urine - Urine, Urine, Catheter In/Out [[id number]]  (Normal) Collected: 07/06/25 1811    Lab Status: Final result Specimen: Urine, Catheter In/Out Updated: 07/06/25 1849     LEGIONELLA ANTIGEN, URINE Negative    Urine Culture - Urine, Urine, Clean Catch [[id number]]  (Normal) Collected: 07/06/25 1811    Lab Status: Final result Specimen: Urine, Clean Catch Updated: 07/08/25 0644     Urine Culture No growth          Imaging Results (Last 72 Hours)       Procedure Component Value Units Date/Time    CT Outside Abd/Pelvis [[id number]] Resulted: 07/07/25 0944     Updated: 07/07/25 0944    Narrative:      This procedure was auto-finalized with no dictation required.    CT Head Without Contrast [[id number]] Collected: 07/06/25 1830     Updated: 07/06/25 1837    Narrative:      CT HEAD WO CONTRAST    Date of Exam: 7/6/2025 6:23 PM EDT    Indication: acute encephalopathy.    Comparison: None available.    Technique: Axial CT images were obtained of the head without contrast administration.  Coronal reconstructions  were performed.  Automated exposure control and iterative reconstruction methods were used.        FINDINGS:    Brain/Ventricles: There is no acute intracranial hemorrhage, mass effect or midline shift. No abnormal extra-axial fluid collection.  The gray-white differentiation is maintained without evidence of an acute infarct.  There is no evidence of   hydrocephalus    Orbits: The visualized portion of the orbits demonstrate no acute abnormality.    Incidental note of the divergent gaze    Sinuses:The visualized paranasal sinuses and mastoid air cells demonstrate no acute abnormality.    Soft Tissues/Skull: No acute abnormality of the visualized skull or soft tissues.      Impression:      No acute intracranial abnormality.      Electronically Signed: Rashaad Joel MD    7/6/2025 6:35 PM EDT    Workstation ID: [id number]    XR Chest 1 View [[id number]] Collected: 07/06/25 1822     Updated: 07/06/25 1825    Narrative:      XR CHEST 1 VW    Date of Exam: 7/6/2025 6:00 PM EDT    Indication: Leukocytosis    Comparison: None available.    Findings:  The right hemidiaphragm is slightly elevated. There are bandlike right perihilar and medial basilar densities representing scarring versus subsegmental atelectasis. The remaining lungs and pleural spaces are clear. The heart size is normal. The pulmonary   vascular markings are normal. There is mild degenerative spondylosis in the thoracic spine.      Impression:      Impression:  1.Slightly elevated right hemidiaphragm.  2.Bandlike right perihilar and medial basilar scarring versus subsegmental atelectasis.        Electronically Signed: Roderick Melton MD    7/6/2025 6:23 PM EDT    Workstation ID: [id number]    US Liver [[id number]] Collected: 07/06/25 1624     Updated: 07/06/25 1627    Narrative:      US LIVER    Date of Exam: 7/6/2025 3:06 PM EDT    Indication: elevated LFT's, check for cirrhosis, ascites, HCC.    Comparison: No comparisons available.    Technique: Grayscale and  color Doppler ultrasound evaluation of the liver was performed.      Findings:  Diffuse increased hepatic echogenicity. Fairly homogeneous hepatic echotexture. Liver is enlarged measuring up to 20 cm in length. No solid appearing mass. No visualized ascites. Hepatopetal flow of main portal vein. No intrahepatic duct dilation. Common   bile duct measures up to 4 mm normal for age.      Impression:      Impression:  1. Hepatomegaly with likely mild to moderate severity hepatic steatosis.  2. No sonographic evidence of hepatocellular carcinoma.  3. No visualized ascites. Hepatopetal flow of main portal vein.        Electronically Signed: Trevor Marks MD    7/6/2025 4:25 PM EDT    Workstation ID: [id number]            Assessment & Plan       Patient admitted into the hospital 7/6/2025.  GI was consulted for elevated bilirubin.  History of alcohol abuse with withdrawal.     ASSESSMENT:  Alcohol hepatitis  Elevated LFTs  Alcohol withdrawal  Electrolyte abnormalities  Thrombocytopenia       PLAN:  -Brenda discriminant function 38.  Patient started on prednisolone for alcoholic hepatitis 7/6/2025.  Early Lille score 0.008 today.  Patient is to continue prednisolone  x 4 weeks.  - Acute hepatitis panel negative.  - Hemoglobin stable at 12.0.  No overt GI bleeding noted.  Continue to monitor H&H and transfuse as needed for hemoglobin less than 7.  - LFTs improving.  AST/ALT ratio consistent with alcohol hepatitis.  Alk phos 176 from 193 , AST from 122, ALT 75 from 71, total bilirubin 6.7 from 8.1.  - RUQ US-hepatomegaly with likely mild to moderate severe  -Continue CIWA protocol.  -Continue regular/high-protein diet as tolerated  -Continue thiamine, folic acid and zinc due to alcohol abuse.   -Continue lactulose and Xifaxan.  -Encouraged nutrition with high-protein diet.  - Supportive care.    Electronically signed by RYAN Camejo, 07/09/25, 9:20 AM EDT.

## 2025-07-09 NOTE — PROGRESS NOTES
[facility] SERVICE  TRANSFER OF CARE/ACCEPTANCE NOTE    PATIENT NAME: Colt Luciano  : 1990  MRN: [id number]            [facility] SERVICE  TRANSFER OF CARE/ACCEPTANCE NOTE    PATIENT NAME: Colt Luciano  : 1990  MRN: [id number]     Active Hospital Problems    Diagnosis  POA    **Alcohol withdrawal delirium [F10.931]  Yes      Resolved Hospital Problems   No resolved problems to display.       Patient seen and examined by me on 25 at 10 am.    Interim History:Colt Luciano is a 35 y.o. male with PMH of alcohol abuse disorder [facility] and was admitted with a principal diagnosis of Alcohol withdrawal delirium.  The following information has been obtained primarily from review of documentation and collaboration with the hospitalist as the patient is confused at the time of admission to the ICU.  The patient reportedly presented to the ED of an [facility] facility overnight for evaluation of increasing tremors, anxiousness, and abdominal pain.  The patient reported that he usually drinks 8-12 beers per day however quit drinking about 4 days ago.  Friends and his boss noted that he was having increased shaking and brought him to the ED.  Findings in the ED ED were consistent with acute kidney injury and acute liver injury.  CT of the abdomen revealed hepatomegaly with steatosis and nodular changes consistent with evolving cirrhosis.  Transfer to [facility] was requested for higher level medical management as well as subspecialty availability.  The patient was initially excepted by the hospitalist for admission to the floor however the patient quickly developed fulminant alcohol withdrawal requiring large doses of Valium and physical restraints for his safety.  The patient was then transferred to the ICU for aggressive medical management and Precedex infusion to facilitate patient care.  The patient's is  primarily Palau[place]-speaking and provided some information via electronic    Patient reports feeling better but still with unsteady gait    I have noted the following changes since admission: continued unsteady gait and patient with no insurance hence difficult SNF placement.    I have reviewed the H&P, diagnostic data and plan of care for Colt Luciano.  I will be taking over care of this patient during the current hospitalization.        Signature: Electronically signed by Jason Carpio MD, 07/09/25, 11:58 EDT.  [facility] Hospitalist Team

## 2025-07-09 NOTE — PROGRESS NOTES
Critical Care Progress Note     Colt Luciano : 1990 MRN:[id number] LOS:3  Rm: 2308/1     Principal Problem: Alcohol withdrawal delirium     Reason for follow up: All the medical problems listed below    Summary     Colt Luciano is a 35 y.o. male with PMH of alcohol abuse disorder presented to the hospital of an [facility], and was admitted with a principal diagnosis of Alcohol withdrawal delirium.  The following information has been obtained primarily from review of documentation and collaboration with the hospitalist as the patient is confused at the time of admission to the ICU.  The patient reportedly presented to the ED of an [facility] facility overnight for evaluation of increasing tremors, anxiousness, and abdominal pain.  The patient reported that he usually drinks 8-12 beers per day however quit drinking about 4 days ago.  Friends and his boss noted that he was having increased shaking and brought him to the ED.  Findings in the ED ED were consistent with acute kidney injury and acute liver injury.  CT of the abdomen revealed hepatomegaly with steatosis and nodular changes consistent with evolving cirrhosis.  Transfer to [facility] was requested for higher level medical management as well as subspecialty availability.  The patient was initially excepted by the hospitalist for admission to the floor however the patient quickly developed fulminant alcohol withdrawal requiring large doses of Valium and physical restraints for his safety.  The patient was then transferred to the ICU for aggressive medical management and Precedex infusion to facilitate patient care.  The patient's is primarily Kyrgyz-speaking and provided some information via electronic         Of note, this patient is marked for a merged chart.  The alternate name is Colt Montano medical record #[id number]       :The patient is awake and communicative today, able to communicate in English for  examination. He remains confused and impulsive however redirects easily. Sitter at bedside for patient safety. Started a diet which is tolerated well.  Hold further antibiotics for now, infectious workup benign at this point.  He is stable for transfer out of the ICU.    7/8:Patient was reupgraded to the ICU because he was combative and aggressive. Given geodon as well as a significant amount of versed and subsequently required precedex. Was in 4 point restraints for violence. This AM the precedex was weaned off. The patient is comfortable, calm, and conversant. Vital signs are stable. No restraints.    Patient is on Hospital Day: 4.    Significant Events / Subjective     07/09/25 : Did not need precedex this AM, is doing well and awake with no complaints. Still with significant jaundice. GI recommendation for 4 weeks of prednisolone. Patient stable for downgrade out of ICU    Assessment / Plan     Acute toxic / metabolic encephalopathy  Hospital acquired delirium  Likely secondary to acute alcohol withdrawal syndrome  EtOH level <10  Urine tox screen positive for barbiturates and benzodiazapine which are consistent with medications administered since admission. Remaining drugs of abuse are negative   Ammonia level 37  CT head negative for acute intracranial findings  Phenobarbital per CIWA protocol   Supportive care for now.   Delirium precautions     Alcohol abuse syndrome  Acute alcohol withdrawal syndrome  Monitor with CIWA protocol  Started phenobarbital taper per EtOH withdrawal protocol     Acute liver injury on chronic, alcohol induced liver disease  Hyperammonemia  Liver ultrasound reviewed: Hepatomegaly with likely mild to moderate severity hepatic steatosis, no visualized ascites, hepatopetal flow with main portal vein  INR 1.36  Bilirubin improving  Acute hepatitis panel nonreactive  GI following  On prednisolone for 4 weeks per GI  Continue rifaximin & lactulose  Ammonia this AM was 93        Disposition:  PCU    Code status:   Code Status (Patient has no pulse and is not breathing): CPR (Attempt to Resuscitate)  Medical Interventions (Patient has pulse or is breathing): Full Support  Level Of Support Discussed With: Patient       Nutrition:   Diet: High Protein Diet; Fluid Consistency: Thin (IDDSI 0)   Patient isn't on Tube Feeding     VTE Prophylaxis:  Pharmacologic & mechanical VTE prophylaxis orders are present.           Objective / Physical Exam     Vital signs:  Temp: 99.7 °F (37.6 °C)  BP: 127/74  Heart Rate: 105  Resp: 25  SpO2: 99 %  Weight: 79.8 kg (175 lb 14.8 oz)    Admission Weight: Weight: 83.1 kg (183 lb 3.2 oz)  Current Weight: Weight: 79.8 kg (175 lb 14.8 oz)    Input/Output in last 24 hours:    Intake/Output Summary (Last 24 hours) at 7/9/2025 1120  Last data filed at 7/9/2025 0840  Gross per 24 hour   Intake 600 ml   Output 900 ml   Net -300 ml      Net IO Since Admission: 1,022 mL [07/09/25 1120]     Physical Exam  Vitals and nursing note reviewed.   Constitutional:       General: He is not in acute distress.     Appearance: He is not ill-appearing.      Comments: Sitting up in bed in chair drinking soda   HENT:      Head: Normocephalic and atraumatic.      Right Ear: External ear normal.      Left Ear: External ear normal.   Eyes:      General: Scleral icterus present.      Conjunctiva/sclera: Conjunctivae normal.   Neck:      Comments: Trachea midline  No JVD  Cardiovascular:      Rate and Rhythm: Regular rhythm. Tachycardia present.      Heart sounds: Normal heart sounds, S1 normal and S2 normal. No murmur heard.  Pulmonary:      Breath sounds: Normal breath sounds. No wheezing or rhonchi.   Abdominal:      General: There is distension.      Tenderness: There is no abdominal tenderness.   Musculoskeletal:      Right lower leg: No edema.      Left lower leg: No edema.   Skin:     General: Skin is warm and dry.      Coloration: Skin is jaundiced.   Neurological:      General: No  focal deficit present.      Mental Status: He is alert.      Sensory: No sensory deficit.      Motor: No weakness.          Radiology and Labs     Results from last 7 days   Lab Units 07/09/25  0510 07/08/25  0509 07/07/25  0522 07/06/25  0620   WBC 10*3/mm3 8.54 9.47 9.46 12.54*   HEMOGLOBIN g/dL 12.0* 12.5* 12.1* 13.2   HEMATOCRIT % 34.8* 36.9* 35.6* 38.2   PLATELETS 10*3/mm3 126* 112* 75* 66*      Results from last 7 days   Lab Units 07/09/25  0510 07/08/25  0509 07/07/25  0522 07/06/25  0620   PROTIME Seconds 15.0* 15.2* 15.7* 16.8*   INR  1.18* 1.20* 1.25* 1.36*      Results from last 7 days   Lab Units 07/09/25  0510 07/09/25  0210 07/08/25  1749 07/08/25  0509 07/07/25  0522 07/06/25  1936 07/06/25  1404 07/06/25  0620   SODIUM mmol/L 137  --   --  143 138  --  133* 134*   POTASSIUM mmol/L 3.2*  --  3.9 3.4* 3.6 3.0* 3.0* 3.3*   CHLORIDE mmol/L 107  --   --  114* 107  --  99 97*   CO2 mmol/L 17.6*  --   --  18.2* 18.3*  --  19.7* 18.5*   ANION GAP mmol/L 12.4  --   --  10.8 12.7  --  14.3 18.5*   BUN mg/dL 13.7  --   --  23.2* 28.3*  --  23.0* 23.6*   CREATININE mg/dL 0.59*  --   --  0.69* 0.93  --  1.10 1.66*   GLUCOSE mg/dL 89  --   --  137* 84  --  85 90   PHOSPHORUS mg/dL 2.2* 1.9* 1.4* 2.0*  --   --  2.5  --    MAGNESIUM mg/dL 1.7  --   --  2.4 2.6  --  2.3 2.0   ALT (SGPT) U/L 75*  --   --  71* 56*  --   --  47*   AST (SGOT) U/L 99*  --   --  122* 135*  --   --  121*   ALK PHOS U/L 176*  --   --  193* 174*  --   --  213*      Results from last 7 days   Lab Units 07/09/25  0510 07/08/25  0509 07/07/25  0522 07/06/25  0620   ALT (SGPT) U/L 75* 71* 56* 47*   AST (SGOT) U/L 99* 122* 135* 121*   ALK PHOS U/L 176* 193* 174* 213*           No radiology results for the last day      Current medications     Scheduled Meds:   enoxaparin sodium, 40 mg, Subcutaneous, Q24H  famotidine, 20 mg, Oral, BID AC  folic acid 1 mg in sodium chloride 0.9 % 50 mL IVPB, 1 mg, Intravenous, Daily  lactulose, 30 g, Oral,  TID  multivitamin with minerals, 1 tablet, Oral, Daily  PHENobarbital, 32.4 mg, Oral, Once  prednisoLONE sodium phosphate, 40 mg, Oral, Daily  rifAXIMin, 550 mg, Oral, Q12H  sodium chloride, 10 mL, Intravenous, Q12H  thiamine (B-1) IV, 200 mg, Intravenous, Q8H   Followed by  [START ON 7/13/2025] thiamine, 100 mg, Oral, Daily  zinc sulfate, 220 mg, Oral, Daily        Continuous Infusions:          Plan discussed with RN. Reviewed all other data in the last 24 hours, including but not limited to vitals, labs, microbiology, imaging and pertinent notes from other providers.      Tung Fernandez MD   Critical Care  07/09/25   11:20 EDT

## 2025-07-09 NOTE — PLAN OF CARE
Goal Outcome Evaluation:         Pt remains alert and oriented. 1mg ativan given once for CIWA score. Pt had multiple liquid BM this shift. No gtts ongoing. Video  used by this RN to explain medical terms to pt. VSS. Pt remains on room air.

## 2025-07-09 NOTE — THERAPY EVALUATION
Patient Name: Colt Luciano  : 1990    MRN: [id number]                              Today's Date: 2025       Admit Date: 2025    Visit Dx: No diagnosis found.  Patient Active Problem List   Diagnosis    Alcohol withdrawal delirium     History reviewed. No pertinent past medical history.  History reviewed. No pertinent surgical history.   General Information       Row Name 25          OT Time and Intention    Document Type evaluation  -     Mode of Treatment occupational therapy  -MP     Session Not Performed unable to evaluate, medical status change;other (see comments)  -       Row Name 25          General Information    Patient Profile Reviewed yes  -MP     Prior Level of Function independent:;ADL's  -MP     Existing Precautions/Restrictions fall  -       Row Name 25          Living Environment    Current Living Arrangements apartment  -     People in Home friend(s)  -       Row Name 25          Cognition    Orientation Status (Cognition) oriented x 4;other (see comments)  -       Row Name 25          Safety Issues/Impairments Affecting Functional Mobility    Impairments Affecting Function (Mobility) balance;endurance/activity tolerance;motor planning;postural/trunk control  -               User Key  (r) = Recorded By, (t) = Taken By, (c) = Cosigned By      Initials Name Provider Type    MP Junior Smith OT Occupational Therapist                     Mobility/ADL's       Row Name 25          Bed Mobility    Bed Mobility supine-sit  -MP     Supine-Sit [place] (Bed Mobility) minimum assist (75% patient effort)  -     Assistive Device (Bed Mobility) head of bed elevated  -       Row Name 25          Sit-Stand Transfer    Sit-Stand [place] (Transfers) minimum assist (75% patient effort);moderate assist (50% patient effort);2 person assist  -       Row Name 25          Activities  of Daily Living    BADL Assessment/Intervention lower body dressing  -MP       Row Name 07/09/25 1652          Lower Body Dressing Assessment/Training    [place] Level (Lower Body Dressing) lower body dressing skills;maximum assist (25% patient effort)  -MP               User Key  (r) = Recorded By, (t) = Taken By, (c) = Cosigned By      Initials Name Provider Type    Junior Pike OT Occupational Therapist                   Obj/Interventions       Row Name 07/09/25 1657          Range of Motion Comprehensive    Comment, General Range of Motion BUE WFL  -MP       Row Name 07/09/25 1657          Strength Comprehensive (MMT)    Comment, General Manual Muscle Testing (MMT) Assessment BUE 4/5  -MP       Row Name 07/09/25 1657          Balance    Balance Interventions sitting;standing;supported;sit to stand;dynamic;static  -MP               User Key  (r) = Recorded By, (t) = Taken By, (c) = Cosigned By      Initials Name Provider Type    MP Junior Smith, OT Occupational Therapist                   Goals/Plan       Row Name 07/09/25 1700          Bed Mobility Goal 1 (OT)    Activity/Assistive Device (Bed Mobility Goal 1, OT) bed mobility activities, all  -MP     [place] Level/Cues Needed (Bed Mobility Goal 1, OT) modified independence  -MP     Time Frame (Bed Mobility Goal 1, OT) long term goal (LTG);2 weeks  -MP       Row Name 07/09/25 1700          Transfer Goal 1 (OT)    Activity/Assistive Device (Transfer Goal 1, OT) sit-to-stand/stand-to-sit;toilet  -MP     [place] Level/Cues Needed (Transfer Goal 1, OT) independent  -MP     Time Frame (Transfer Goal 1, OT) long term goal (LTG);2 weeks  -MP       Row Name 07/09/25 1700          Dressing Goal 1 (OT)    Activity/Device (Dressing Goal 1, OT) lower body dressing  -MP     [place]/Cues Needed (Dressing Goal 1, OT) independent  -MP     Time Frame (Dressing Goal 1, OT) long term goal (LTG);2 weeks  -MP               User Key  (r) = Recorded  By, (t) = Taken By, (c) = Cosigned By      Initials Name Provider Type    Junior Pike, JOSÉ MIGUEL Occupational Therapist                   Clinical Impression       Row Name 07/09/25 1658          Pain Assessment    Pretreatment Pain Rating 0/10 - no pain  -MP     Posttreatment Pain Rating 0/10 - no pain  -MP       Row Name 07/09/25 1658          Plan of Care Review    Plan of Care Reviewed With patient  -MP     Progress no change  -MP     Outcome Evaluation 34 yo male adm 7/6/25 for etoh withdrawal. Also dx w/ ORLY, acute liver injury, hyperammonia, metabolic encephalopathy. CT abdomen revealed evolving liver cirrhosis. Pt had stopped drinking on his own 4 days prior to admission. Normally drinks 12-15 beers daily. No significant PMH known. Pt. states he speaks english, although he would benefit from . Pt. lives at home w/ friends and maintains I/ADL independence. Pt. provided min-mod A x 2 for SPS transfers this date, increased tremors w/ standing and multiple LOB. Pt. provided max A for all LB ADLs. Recommend SNF placement at d/c to address aforementioned deficits pending progress.  -MP       Row Name 07/09/25 1658          Therapy Assessment/Plan (OT)    Rehab Potential (OT) good  -MP     Criteria for Skilled Therapeutic Interventions Met (OT) yes;meets criteria;skilled treatment is necessary  -MP     Therapy Frequency (OT) 3 times/wk  -MP       Row Name 07/09/25 1658          Therapy Plan Review/Discharge Plan (OT)    Anticipated Discharge Disposition (OT) skilled nursing facility  -MP       Row Name 07/09/25 1658          Vital Signs    Pre Patient Position Supine  -MP     Intra Patient Position Standing  -MP     Post Patient Position Sitting  -MP       Row Name 07/09/25 1658          Positioning and Restraints    Pre-Treatment Position in bed  -MP     Post Treatment Position chair  -MP     In Chair sitting;call light within reach;encouraged to call for assist;exit alarm on  -MP               User  Key  (r) = Recorded By, (t) = Taken By, (c) = Cosigned By      Initials Name Provider Type    Junior Pike OT Occupational Therapist                   Outcome Measures       Row Name 07/09/25 1316 07/09/25 0800       How much help from another person do you currently need...    Turning from your back to your side while in flat bed without using bedrails? 4  -CM 4  -TD    Moving from lying on back to sitting on the side of a flat bed without bedrails? 3  -CM 4  -TD    Moving to and from a bed to a chair (including a wheelchair)? 2  -CM 3  -TD    Standing up from a chair using your arms (e.g., wheelchair, bedside chair)? 2  -CM 3  -TD    Climbing 3-5 steps with a railing? 1  -CM 2  -TD    To walk in hospital room? 2  -CM 2  -TD    AM-PAC 6 Clicks Score (PT) 14  -CM 18  -TD              User Key  (r) = Recorded By, (t) = Taken By, (c) = Cosigned By      Initials Name Provider Type    CM Rebekah Mcintosh, PT Physical Therapist    TD Corazon Dozier, RN Registered Nurse                    Occupational Therapy Education       Title: PT OT SLP Therapies (In Progress)       Topic: Occupational Therapy (In Progress)       Point: Body mechanics (Done)       Learning Progress Summary            Patient Acceptance, E,TB, VU by  at 7/9/2025 1701                                      User Key       Initials Effective Dates Name Provider Type Discipline     06/16/21 -  Junior Smith OT Occupational Therapist OT                  OT Recommendation and Plan  Therapy Frequency (OT): 3 times/wk  Plan of Care Review  Plan of Care Reviewed With: patient  Progress: no change  Outcome Evaluation: 36 yo male adm 7/6/25 for etoh withdrawal. Also dx w/ ORLY, acute liver injury, hyperammonia, metabolic encephalopathy. CT abdomen revealed evolving liver cirrhosis. Pt had stopped drinking on his own 4 days prior to admission. Normally drinks 12-15 beers daily. No significant PMH known. Pt. states he speaks english, although he  would benefit from . Pt. lives at home w/ friends and maintains I/ADL independence. Pt. provided min-mod A x 2 for SPS transfers this date, increased tremors w/ standing and multiple LOB. Pt. provided max A for all LB ADLs. Recommend SNF placement at d/c to address aforementioned deficits pending progress.     Time Calculation:         Time Calculation- OT       Row Name 07/09/25 1701             Time Calculation- OT    OT Start Time 0948  -MP      OT Stop Time 1008  -MP      OT Time Calculation (min) 20 min  -MP      Total Timed Code Minutes- OT 0 minute(s)  -MP      OT Received On 07/09/25  -MP      OT - Next Appointment 07/11/25  -      OT Goal Re-Cert Due Date 07/23/25  -                User Key  (r) = Recorded By, (t) = Taken By, (c) = Cosigned By      Initials Name Provider Type    Junior Pike OT Occupational Therapist                  Therapy Charges for Today       Code Description Service Date Service Provider Modifiers Qty    72877936169 HC OT EVAL LOW COMPLEXITY 4 7/9/2025 Junior Smith OT GO 1                 Junior Smith OT  7/9/2025

## 2025-07-09 NOTE — PLAN OF CARE
Goal Outcome Evaluation:  Plan of Care Reviewed With: patient           Outcome Evaluation: 36 yo male adm 7/6/25 for etoh withdrawal. Also dx w/ ORLY, acute liver injury, hyperammonia, metabolic encephalopathy. CT abdomen revealed evolving liver cirrhosis. Pt had stopped drinking on his own 4 days prior to admission. Normally drinks 12-15 beers daily. No significant PMH known. Today, pt is alert and agreeable to PT. Says he speaks English well and does not need , but pt had difficulty seeming to understand some questions until simple [place] instructions used. This may have been due to confusion vs. language barrier, but may be helpful to have ipad  at future sessions. Comes to sit w/ cga/sba. However, requires mod assist x 2 and rw to come to stand. Then requires mod to max assist of 2 to amb w/ rw x 8 ft. Pt's gait markedly ataxic and tremulous. Pt very unsafe for home at this time. Recommend SNF at d/c, pending progress. If able to improve adequately, would benefit from OP psych rehab. Will follow.

## 2025-07-10 ENCOUNTER — READMISSION MANAGEMENT (OUTPATIENT)
Dept: CALL CENTER | Facility: HOSPITAL | Age: 35
End: 2025-07-10
Payer: MEDICAID

## 2025-07-10 VITALS
HEART RATE: 66 BPM | RESPIRATION RATE: 21 BRPM | HEIGHT: 65 IN | SYSTOLIC BLOOD PRESSURE: 119 MMHG | DIASTOLIC BLOOD PRESSURE: 68 MMHG | BODY MASS INDEX: 30.82 KG/M2 | WEIGHT: 184.97 LBS | TEMPERATURE: 97.9 F | OXYGEN SATURATION: 94 %

## 2025-07-10 PROBLEM — R74.01 TRANSAMINITIS: Status: ACTIVE | Noted: 2025-07-10

## 2025-07-10 PROBLEM — F10.931 ALCOHOL WITHDRAWAL DELIRIUM: Status: RESOLVED | Noted: 2025-07-06 | Resolved: 2025-07-10

## 2025-07-10 PROBLEM — F10.20 ALCOHOL DEPENDENCE: Status: ACTIVE | Noted: 2025-07-10

## 2025-07-10 LAB
ALBUMIN SERPL-MCNC: 3.1 G/DL (ref 3.5–5.2)
ALBUMIN/GLOB SERPL: 0.9 G/DL
ALP SERPL-CCNC: 206 U/L (ref 39–117)
ALT SERPL W P-5'-P-CCNC: 77 U/L (ref 1–41)
ANION GAP SERPL CALCULATED.3IONS-SCNC: 12.2 MMOL/L (ref 5–15)
AST SERPL-CCNC: 97 U/L (ref 1–40)
BASOPHILS # BLD AUTO: 0.02 10*3/MM3 (ref 0–0.2)
BASOPHILS NFR BLD AUTO: 0.2 % (ref 0–1.5)
BILIRUB SERPL-MCNC: 5.7 MG/DL (ref 0–1.2)
BUN SERPL-MCNC: 9.8 MG/DL (ref 6–20)
BUN/CREAT SERPL: 19.2 (ref 7–25)
CALCIUM SPEC-SCNC: 8.5 MG/DL (ref 8.6–10.5)
CHLORIDE SERPL-SCNC: 105 MMOL/L (ref 98–107)
CO2 SERPL-SCNC: 18.8 MMOL/L (ref 22–29)
CREAT SERPL-MCNC: 0.51 MG/DL (ref 0.76–1.27)
DEPRECATED RDW RBC AUTO: 57.3 FL (ref 37–54)
EGFRCR SERPLBLD CKD-EPI 2021: 137.3 ML/MIN/1.73
EOSINOPHIL # BLD AUTO: 0.09 10*3/MM3 (ref 0–0.4)
EOSINOPHIL NFR BLD AUTO: 1 % (ref 0.3–6.2)
ERYTHROCYTE [DISTWIDTH] IN BLOOD BY AUTOMATED COUNT: 16.4 % (ref 12.3–15.4)
GLOBULIN UR ELPH-MCNC: 3.4 GM/DL
GLUCOSE BLDC GLUCOMTR-MCNC: 127 MG/DL (ref 70–105)
GLUCOSE SERPL-MCNC: 112 MG/DL (ref 65–99)
HCT VFR BLD AUTO: 35.2 % (ref 37.5–51)
HGB BLD-MCNC: 12 G/DL (ref 13–17.7)
IMM GRANULOCYTES # BLD AUTO: 0.07 10*3/MM3 (ref 0–0.05)
IMM GRANULOCYTES NFR BLD AUTO: 0.8 % (ref 0–0.5)
INR PPP: 1.3 (ref 0.9–1.1)
LIPASE SERPL-CCNC: 59 U/L (ref 13–60)
LYMPHOCYTES # BLD AUTO: 1.85 10*3/MM3 (ref 0.7–3.1)
LYMPHOCYTES NFR BLD AUTO: 20 % (ref 19.6–45.3)
MAGNESIUM SERPL-MCNC: 1.8 MG/DL (ref 1.6–2.6)
MCH RBC QN AUTO: 32.3 PG (ref 26.6–33)
MCHC RBC AUTO-ENTMCNC: 34.1 G/DL (ref 31.5–35.7)
MCV RBC AUTO: 94.9 FL (ref 79–97)
MONOCYTES # BLD AUTO: 1.09 10*3/MM3 (ref 0.1–0.9)
MONOCYTES NFR BLD AUTO: 11.8 % (ref 5–12)
NEUTROPHILS NFR BLD AUTO: 6.14 10*3/MM3 (ref 1.7–7)
NEUTROPHILS NFR BLD AUTO: 66.2 % (ref 42.7–76)
NRBC BLD AUTO-RTO: 0 /100 WBC (ref 0–0.2)
PHOSPHATE SERPL-MCNC: 2.8 MG/DL (ref 2.5–4.5)
PLATELET # BLD AUTO: 134 10*3/MM3 (ref 140–450)
PMV BLD AUTO: 11.3 FL (ref 6–12)
POTASSIUM SERPL-SCNC: 3.5 MMOL/L (ref 3.5–5.2)
POTASSIUM SERPL-SCNC: 4.4 MMOL/L (ref 3.5–5.2)
PROT SERPL-MCNC: 6.5 G/DL (ref 6–8.5)
PROTHROMBIN TIME: 16.2 SECONDS (ref 11.7–14.2)
RBC # BLD AUTO: 3.71 10*6/MM3 (ref 4.14–5.8)
SODIUM SERPL-SCNC: 136 MMOL/L (ref 136–145)
WBC NRBC COR # BLD AUTO: 9.26 10*3/MM3 (ref 3.4–10.8)

## 2025-07-10 PROCEDURE — 97530 THERAPEUTIC ACTIVITIES: CPT

## 2025-07-10 PROCEDURE — 83690 ASSAY OF LIPASE: CPT | Performed by: INTERNAL MEDICINE

## 2025-07-10 PROCEDURE — 85610 PROTHROMBIN TIME: CPT | Performed by: NURSE PRACTITIONER

## 2025-07-10 PROCEDURE — 84100 ASSAY OF PHOSPHORUS: CPT

## 2025-07-10 PROCEDURE — 83735 ASSAY OF MAGNESIUM: CPT | Performed by: NURSE PRACTITIONER

## 2025-07-10 PROCEDURE — 25010000002 THIAMINE PER 100 MG: Performed by: NURSE PRACTITIONER

## 2025-07-10 PROCEDURE — 97116 GAIT TRAINING THERAPY: CPT

## 2025-07-10 PROCEDURE — 80053 COMPREHEN METABOLIC PANEL: CPT | Performed by: NURSE PRACTITIONER

## 2025-07-10 PROCEDURE — 25010000002 FOLIC ACID 5 MG/ML SOLUTION 10 ML VIAL: Performed by: NURSE PRACTITIONER

## 2025-07-10 PROCEDURE — 82948 REAGENT STRIP/BLOOD GLUCOSE: CPT

## 2025-07-10 PROCEDURE — 63710000001 PREDNISOLONE PER 5 MG: Performed by: NURSE PRACTITIONER

## 2025-07-10 PROCEDURE — 84132 ASSAY OF SERUM POTASSIUM: CPT | Performed by: INTERNAL MEDICINE

## 2025-07-10 PROCEDURE — 85025 COMPLETE CBC W/AUTO DIFF WBC: CPT | Performed by: HOSPITALIST

## 2025-07-10 RX ORDER — POTASSIUM CHLORIDE 1500 MG/1
40 TABLET, EXTENDED RELEASE ORAL EVERY 4 HOURS
Status: COMPLETED | OUTPATIENT
Start: 2025-07-10 | End: 2025-07-10

## 2025-07-10 RX ORDER — FAMOTIDINE 20 MG/1
20 TABLET, FILM COATED ORAL
Qty: 60 TABLET | Refills: 0 | Status: SHIPPED | OUTPATIENT
Start: 2025-07-10 | End: 2025-07-16 | Stop reason: SDUPTHER

## 2025-07-10 RX ORDER — LACTULOSE 10 G/15ML
10 SOLUTION ORAL 3 TIMES DAILY
Qty: 1419 ML | Refills: 0 | Status: SHIPPED | OUTPATIENT
Start: 2025-07-10 | End: 2025-08-11

## 2025-07-10 RX ORDER — PREDNISOLONE SODIUM PHOSPHATE 15 MG/5ML
40 SOLUTION ORAL DAILY
Qty: 350 ML | Refills: 0 | Status: SHIPPED | OUTPATIENT
Start: 2025-07-11 | End: 2025-08-06

## 2025-07-10 RX ORDER — PANTOPRAZOLE SODIUM 40 MG/1
40 TABLET, DELAYED RELEASE ORAL 2 TIMES DAILY
Status: DISCONTINUED | OUTPATIENT
Start: 2025-07-10 | End: 2025-07-10 | Stop reason: HOSPADM

## 2025-07-10 RX ADMIN — POTASSIUM CHLORIDE 40 MEQ: 1500 TABLET, EXTENDED RELEASE ORAL at 05:58

## 2025-07-10 RX ADMIN — FAMOTIDINE 20 MG: 20 TABLET, FILM COATED ORAL at 07:56

## 2025-07-10 RX ADMIN — RIFAXIMIN 550 MG: 550 TABLET ORAL at 08:41

## 2025-07-10 RX ADMIN — PREDNISOLONE SODIUM PHOSPHATE 40 MG: 15 SOLUTION ORAL at 09:26

## 2025-07-10 RX ADMIN — THIAMINE HYDROCHLORIDE 200 MG: 100 INJECTION, SOLUTION INTRAMUSCULAR; INTRAVENOUS at 05:58

## 2025-07-10 RX ADMIN — Medication 10 ML: at 08:42

## 2025-07-10 RX ADMIN — FOLIC ACID 1 MG: 5 INJECTION, SOLUTION INTRAMUSCULAR; INTRAVENOUS; SUBCUTANEOUS at 08:42

## 2025-07-10 RX ADMIN — Medication 1 TABLET: at 08:41

## 2025-07-10 RX ADMIN — POTASSIUM CHLORIDE 40 MEQ: 1500 TABLET, EXTENDED RELEASE ORAL at 11:08

## 2025-07-10 RX ADMIN — Medication 220 MG: at 08:41

## 2025-07-10 RX ADMIN — LACTULOSE SOLUTION USP, 10 G/15 ML 30 G: 10 SOLUTION ORAL; RECTAL at 08:41

## 2025-07-10 RX ADMIN — THIAMINE HYDROCHLORIDE 200 MG: 100 INJECTION, SOLUTION INTRAMUSCULAR; INTRAVENOUS at 14:19

## 2025-07-10 RX ADMIN — PANTOPRAZOLE SODIUM 40 MG: 40 TABLET, DELAYED RELEASE ORAL at 11:54

## 2025-07-10 NOTE — CASE MANAGEMENT/SOCIAL WORK
Continued Stay Note  BIBI Ardon     Patient Name: Colt Luciano  MRN: [id number]  Today's Date: 7/10/2025    Admit Date: 7/6/2025    Plan: From home with assist from Significant Other and 2 coworkers.   Discharge Plan       Row Name 07/10/25 1258       Plan    Plan From home with assist from Significant Other and 2 coworkers.    Plan Comments CM informed per SHADI Silva, that patient refused going to SNF and wants to return home with assist from his girlfriend Carmen Sales ([phone]) and two co-workers. Pt relayed to [facility], that he had been ambulating around his room.             Expected Discharge Date and Time       Expected Discharge Date Expected Discharge Time    Jul 11, 2025        Jennifer Melton RN    [phone]  [email or web address]

## 2025-07-10 NOTE — DISCHARGE SUMMARY
"[facility] Medicine Services  Discharge Summary    Date of Service: 7/10/2025  Patient Name: Colt Luciano  : 1990  MRN: [id number]    Date of Admission: 2025  Discharge Diagnosis: Alcohol withdrawal delirium  Date of Discharge: 7/10/2025  Primary Care Physician: Joyce Shannon APRN      Presenting Problem:   Alcohol withdrawal delirium [F10.931]    Active and Resolved Hospital Problems:  Active Hospital Problems    Diagnosis POA    Transaminitis [R74.01] Yes    Alcohol dependence [F10.20] Yes      Resolved Hospital Problems    Diagnosis POA    **Alcohol withdrawal delirium [F10.931] Yes     Continue prednisolone 40 mg daily x 28 days total.  Continue lactulose and Xifaxan  Recommend alcohol rehab  Follow-up with GI in the office in 2 to 4 weeks  Recommending avoiding alcohol.  Will need repeat LFTs in the outpatient setting for continued monitoring.    GERD-famotidine twice daily.  Outpatient follow-up with GI.    Hospital Course     HPI:    \"Colt Luciano is a 35 y.o. male with PMH of alcohol abuse disorder [facility], and was admitted with a principal diagnosis of Alcohol withdrawal delirium.  The following information has been obtained primarily from review of documentation and collaboration with the hospitalist as the patient is confused at the time of admission to the ICU.  The patient reportedly [facility] for evaluation of increasing tremors, anxiousness, and abdominal pain.  The patient reported that he usually drinks 8-12 beers per day however quit drinking about 4 days ago.  Friends and his boss noted that he was having increased shaking and brought him to the ED.  Findings in the ED ED were consistent with acute kidney injury and acute liver injury.  CT of the abdomen revealed hepatomegaly with steatosis and nodular changes consistent with evolving cirrhosis.  Transfer to [facility] was requested for higher level medical management as well as subspecialty availability.  The patient was initially excepted by the hospitalist for admission to the floor however the patient quickly developed fulminant alcohol withdrawal requiring large doses of Valium and physical restraints for his safety.  The patient was then transferred to the ICU for aggressive medical management and Precedex infusion to facilitate patient care.  The patient's is primarily Venezuelan-speaking and provided some information via electronic  \"    Hospital Course:  While in the hospital patient treated for alcohol withdrawal along with severe liver dysfunction.  Gastroenterology evaluated the patient of which recommending patient be discharged with prednisolone for 28 days total, rifaximin, lactulose, discontinuation of alcohol use.  Patient given the option for possible facility/rehabilitation of which he did not declined at this time would like to discharge home.  Patient stable no longer requiring CIWA protocol during hospitalization.  Patient educated significantly in Venezuelan in regards to avoiding alcohol use.  Concern for GERD no patient was started on famotidine.    DISCHARGE Follow Up Recommendations for labs and diagnostics: LFT        Day of Discharge     Vital Signs:  Temp:  [97.7 °F (36.5 °C)-98.5 °F (36.9 °C)] 97.9 °F (36.6 °C)  Heart Rate:  [69-91] 78  Resp:  [15-21] 21  BP: ()/(56-90) 136/89    Physical Exam:  Physical Exam  Vitals and nursing note reviewed.   Constitutional:       General: He is not in acute distress.     Appearance: Normal appearance. He is normal weight. He is not ill-appearing or toxic-appearing.   HENT:      Head: Normocephalic and atraumatic.      Nose: Nose normal.      Mouth/Throat:      Mouth: Mucous membranes are moist.      Pharynx: Oropharynx is clear.   Eyes:      General: No scleral icterus.     Extraocular Movements: Extraocular movements intact.      " Conjunctiva/sclera: Conjunctivae normal.   Cardiovascular:      Rate and Rhythm: Normal rate and regular rhythm.      Pulses: Normal pulses.      Heart sounds: Normal heart sounds. No murmur heard.     No friction rub. No gallop.   Pulmonary:      Effort: Pulmonary effort is normal. No respiratory distress.      Breath sounds: Normal breath sounds. No wheezing, rhonchi or rales.   Abdominal:      General: Abdomen is flat. Bowel sounds are normal. There is no distension.      Palpations: Abdomen is soft.      Tenderness: There is no abdominal tenderness. There is no guarding.   Musculoskeletal:         General: Normal range of motion.      Cervical back: Normal range of motion. No rigidity or tenderness.      Right lower leg: No edema.      Left lower leg: No edema.   Skin:     General: Skin is warm.      Coloration: Skin is not jaundiced or pale.   Neurological:      General: No focal deficit present.      Mental Status: He is alert and oriented to person, place, and time. Mental status is at baseline.   Psychiatric:         Mood and Affect: Mood normal.         Behavior: Behavior normal.         Thought Content: Thought content normal.         Judgment: Judgment normal.            Pertinent  and/or Most Recent Results     LAB RESULTS:      Lab 07/10/25  0355 07/09/25  0510 07/08/25  0509 07/07/25  0522 07/06/25  1936 07/06/25  1404 07/06/25  0620   WBC 9.26 8.54 9.47 9.46  --   --  12.54*   HEMOGLOBIN 12.0* 12.0* 12.5* 12.1*  --   --  13.2   HEMATOCRIT 35.2* 34.8* 36.9* 35.6*  --   --  38.2   PLATELETS 134* 126* 112* 75*  --   --  66*   NEUTROS ABS 6.14 5.90 7.18* 7.55*  --   --  10.01*   IMMATURE GRANS (ABS) 0.07* 0.06* 0.04 0.04  --   --  0.09*   LYMPHS ABS 1.85 1.61 1.30 0.97  --   --  1.07   MONOS ABS 1.09* 0.80 0.91* 0.89  --   --  1.28*   EOS ABS 0.09 0.13 0.02 0.00  --   --  0.06   MCV 94.9 95.1 95.3 94.2  --   --  93.4   PROCALCITONIN  --   --   --   --   --  0.95*  --    LACTATE  --   --   --   --  0.9  --    --    PROTIME 16.2* 15.0* 15.2* 15.7*  --   --  16.8*         Lab 07/10/25  0355 07/09/25  1629 07/09/25  0510 07/09/25  0210 07/08/25  1749 07/08/25  0509 07/07/25  0522 07/06/25  1936 07/06/25  1404   SODIUM 136  --  137  --   --  143 138  --  133*   POTASSIUM 3.5 3.9 3.2*  --  3.9 3.4* 3.6   < > 3.0*   CHLORIDE 105  --  107  --   --  114* 107  --  99   CO2 18.8*  --  17.6*  --   --  18.2* 18.3*  --  19.7*   ANION GAP 12.2  --  12.4  --   --  10.8 12.7  --  14.3   BUN 9.8  --  13.7  --   --  23.2* 28.3*  --  23.0*   CREATININE 0.51*  --  0.59*  --   --  0.69* 0.93  --  1.10   EGFR 137.3  --  131.4  --   --  125.3 111.2  --  90.9   GLUCOSE 112*  --  89  --   --  137* 84  --  85   CALCIUM 8.5*  --  8.4*  --   --  8.3* 8.2*  --  8.2*   MAGNESIUM 1.8  --  1.7  --   --  2.4 2.6  --  2.3   PHOSPHORUS 2.8 3.0 2.2* 1.9* 1.4* 2.0*  --   --  2.5    < > = values in this interval not displayed.         Lab 07/10/25  0355 07/09/25  0510 07/08/25  0509 07/07/25  0522 07/06/25  0620   TOTAL PROTEIN 6.5 6.2 6.5 6.3 7.2   ALBUMIN 3.1* 3.1* 3.2* 3.1* 3.4*   GLOBULIN 3.4 3.1 3.3 3.2 3.8   ALT (SGPT) 77* 75* 71* 56* 47*   AST (SGOT) 97* 99* 122* 135* 121*   BILIRUBIN 5.7* 6.7* 8.1* 12.6* 14.5*   ALK PHOS 206* 176* 193* 174* 213*   LIPASE 59  --   --   --  39         Lab 07/10/25  0355 07/09/25  0510 07/08/25  0509 07/07/25  0522 07/06/25  0620   PROTIME 16.2* 15.0* 15.2* 15.7* 16.8*   INR 1.30* 1.18* 1.20* 1.25* 1.36*                 Brief Urine Lab Results  (Last result in the past 365 days)        Color   Clarity   Blood   Leuk Est   Nitrite   Protein   CREAT   Urine HCG        07/06/25 1811 Orange  Comment: Any Substance that causes an abnormal urine color can alter the accuracy of the chemical reactions.   Cloudy   Large (3+)   Small (1+)   Positive   >=300 mg/dL (3+)                 Microbiology Results (last 10 days)       Procedure Component Value - Date/Time    Blood Culture - Blood, Arm, Left [[id number]]  (Normal)  Collected: 07/06/25 1944    Lab Status: Preliminary result Specimen: Blood from Arm, Left Updated: 07/09/25 2001     Blood Culture No growth at 3 days    MRSA Screen, PCR (Inpatient) - Swab, Nares [[id number]]  (Normal) Collected: 07/06/25 1930    Lab Status: Final result Specimen: Swab from Nares Updated: 07/06/25 2131     MRSA PCR No MRSA Detected    Narrative:      The negative predictive value of this diagnostic test is high and should only be used to consider de-escalating anti-MRSA therapy. A positive result may indicate colonization with MRSA and must be correlated clinically.    Blood Culture - Blood, Arm, Right [[id number]]  (Normal) Collected: 07/06/25 1913    Lab Status: Preliminary result Specimen: Blood from Arm, Right Updated: 07/09/25 1932     Blood Culture No growth at 3 days    Narrative:      Less than seven (7) mL's of blood was collected.  Insufficient quantity may yield false negative results.    S. Pneumo Ag Urine or CSF - Urine, Urine, Catheter In/Out [[id number]]  (Normal) Collected: 07/06/25 1811    Lab Status: Final result Specimen: Urine, Catheter In/Out Updated: 07/06/25 1849     Strep Pneumo Ag Negative    Legionella Antigen, Urine - Urine, Urine, Catheter In/Out [[id number]]  (Normal) Collected: 07/06/25 1811    Lab Status: Final result Specimen: Urine, Catheter In/Out Updated: 07/06/25 1849     LEGIONELLA ANTIGEN, URINE Negative    Urine Culture - Urine, Urine, Clean Catch [[id number]]  (Normal) Collected: 07/06/25 1811    Lab Status: Final result Specimen: Urine, Clean Catch Updated: 07/08/25 0644     Urine Culture No growth            CT Head Without Contrast  Result Date: 7/6/2025  Impression: No acute intracranial abnormality. Electronically Signed: Rashaad Joel MD  7/6/2025 6:35 PM EDT  Workstation ID: [id number]    XR Chest 1 View  Result Date: 7/6/2025  Impression: Impression: 1.Slightly elevated right hemidiaphragm. 2.Bandlike right perihilar and medial basilar scarring versus  subsegmental atelectasis. Electronically Signed: Roderick Melton MD  7/6/2025 6:23 PM EDT  Workstation ID: [id number]    US Liver  Result Date: 7/6/2025  Impression: Impression: 1. Hepatomegaly with likely mild to moderate severity hepatic steatosis. 2. No sonographic evidence of hepatocellular carcinoma. 3. No visualized ascites. Hepatopetal flow of main portal vein. Electronically Signed: Trevor Marks MD  7/6/2025 4:25 PM EDT  Workstation ID: [id number]                  Labs Pending at Discharge:  Pending Results       Procedure [Order ID] Specimen - Date/Time    Potassium [[id number]]     Specimen: Blood             Procedures Performed           Consults:   Consults       Date and Time Order Name Status Description    7/9/2025  8:23 AM Inpatient Hospitalist Consult      7/8/2025  1:24 AM Inpatient Intensivist Consult      7/6/2025  5:54 AM Inpatient Psychiatrist Consult Completed     7/6/2025  5:41 AM Inpatient Gastroenterology Consult                Discharge Details        Discharge Medications        New Medications        Instructions Start Date   famotidine 20 MG tablet  Commonly known as: PEPCID   20 mg, Oral, 2 Times Daily Before Meals      lactulose 10 GM/15ML solution  Commonly known as: CHRONULAC   10 g, Oral, 3 Times Daily      prednisoLONE sodium phosphate 15 MG/5ML solution  Commonly known as: ORAPRED   40 mg, Oral, Daily   Start Date: July 11, 2025     riFAXIMin 550 MG tablet  Commonly known as: XIFAXAN   550 mg, Oral, Every 12 Hours Scheduled               No Known Allergies      Discharge Disposition:   Home or Self Care    Diet:  Hospital:  Diet Order   Procedures    Diet: High Protein Diet; Fluid Consistency: Thin (IDDSI 0)         Discharge Activity:         CODE STATUS:  Code Status and Medical Interventions: CPR (Attempt to Resuscitate); Full Support   Ordered at: 07/06/25 0543     Code Status (Patient has no pulse and is not breathing):    CPR (Attempt to Resuscitate)     Medical Interventions  (Patient has pulse or is breathing):    Full Support     Level Of Support Discussed With:    Patient         No future appointments.        Time spent on Discharge including face to face service:  35 minutes    Signature: Electronically signed by Ulices Wood MD, 07/10/25, 14:04 EDT.  [facility], [place], operated by [facility] Hospitalist Team

## 2025-07-10 NOTE — OUTREACH NOTE
Prep Survey      Flowsheet Row Responses   [facility]? [facility]   Is LACE score < 7 ? No   Eligibility [facility]   Date of Admission 07/06/25   Date of Discharge 07/10/25   Discharge Disposition Home or Self Care   Discharge diagnosis Alcohol withdrawal delirium   Does the patient have one of the following disease processes/diagnoses(primary or secondary)? Other   Does the patient have Home health ordered? No   Is there a DME ordered? No   Prep survey completed? Yes            DEION WOODWARD - Registered Nurse

## 2025-07-10 NOTE — DISCHARGE INSTR - APPOINTMENTS
Follow up with RYAN Daniel on 7/16/25 at 9:00am.    Follow up with [facility] on 7/21/25 at 2:00pm.     Important instructions for your appointments:    Please arrive 15 minutes early for your appointments.  Bring current photo id, insurance cards, and a list of your medications.       **An  will be available at your appointments.**

## 2025-07-10 NOTE — DISCHARGE INSTR - OTHER ORDERS
Follow up with RYAN Daniel on 7/16/25 at 9:00am.    Follow up with [facility] of [facility] on 7/21/25 at 2:00pm.     Important instructions for your appointments:    Please arrive 15 minutes early for your appointments.  Bring current photo id, insurance cards, and a list of your medications.       **An  will be available at your appointments.**

## 2025-07-10 NOTE — DISCHARGE INSTRUCTIONS
We recommend that you avoid alcohol at all costs.  We recommend that you follow-up with gastroenterology and primary care provider after being discharged from the hospital for continued monitoring and management of your liver dysfunction.  -Recommend taking medication as prescribed.  If you notice that you are having significant bowel movement greater than 3/day you can hold your lactulose.  Restart if you were to have less than 3 bowel movements per day.  This medication was prescribed and recommended by gastroenterology.

## 2025-07-10 NOTE — CASE MANAGEMENT/SOCIAL WORK
Continued Stay Note  [facility]     Patient Name: Colt Luciano  MRN: [id number]  Today's Date: 7/10/2025    Admit Date: 7/6/2025     Discharge Plan       Row Name 07/10/25 1602       Plan    Plan Comments Discharge  was contacted by ANA & JULIAN asking for a PCP for pt with a Ukrainian speaking . A PCP appointment was secured with RYAN Daniel on 7/16/25 at 9:00am. Patient will also be seen by [facility] of [facility] on 7/21/25 at 2:00pm. AVS was updated and ANA and JULIAN made aware.           Nav Cuenca, Discharge   [facility]  Care Coordination  [address] [place] [address]  Office: [phone]  Fax: [phone]

## 2025-07-10 NOTE — PLAN OF CARE
Goal Outcome Evaluation:      Colt Luciano presents with functional mobility impairments related to alcohol withdrawal delirium which indicate the need for skilled intervention. Tolerating session today without incident. Pt transfers well but is ataxic and unsteady with ambulation without AD.  Gait pattern improves with use of RW.  Will continue to follow and progress as tolerated. If pt progresses as anticipated, I do not anticipate that he will need a RW at discharge, but it is helpful at this time. Pt still with safety concerns and may benefit from SNF at discharge.

## 2025-07-10 NOTE — CASE MANAGEMENT/SOCIAL WORK
"Social Work Assessment  [facility]     Patient Name: Colt Luciano  MRN: [id number]  Today's Date: 7/10/2025    Admit Date: 7/6/2025     Demographic Summary       Row Name 07/10/25 3196       General Information    Reason for Consult health care directive;discharge planning    General Information Comments SW f/u with pt with assistance of telephonic  Sanju (#[id number]) to obtain the rest of info to complete HCR. Document sent to HIM and original/copies provided to pt. SW also spoke with pt's boss Beau @1669 to confirm he is agreeable to being back-up HCR. SW also noted pt is rec'd for SNF. SW discussed with pt and he declines SNF at this time. Pt reports he wants to go home. SW inquired of who is available to provide support at home and pt initially stated his friends could. SW pointed out that his friends work all the time, and then pt stated his g/f, Carmen, does not work and could help him. SW provided Carmen's contact info to RNCM, if needed.                Substance Abuse       Row Name 07/10/25 6712       Substance Use    Substance Use Comment SW was consulted re: etoh abuse. SW met with pt to discuss further. Pt affirms drinking 10-15 (12 oz) beers qd. Pt states, \"but no more.\" Pt reports having gone to AA and was unaware there are Danish language AA meetings locally. SW provided pt with info for local AA meeting finder. Pt thanked this writer and denies further needs at this time.             Twila Villanueva, MSW, LSW  Medical Social Worker  Ph [phone]  Fax [phone]  [email or web address]    "

## 2025-07-11 ENCOUNTER — TRANSITIONAL CARE MANAGEMENT TELEPHONE ENCOUNTER (OUTPATIENT)
Dept: CALL CENTER | Facility: HOSPITAL | Age: 35
End: 2025-07-11
Payer: MEDICAID

## 2025-07-11 LAB
BACTERIA SPEC AEROBE CULT: NORMAL
BACTERIA SPEC AEROBE CULT: NORMAL

## 2025-07-11 NOTE — OUTREACH NOTE
Call Center TCM Note      Flowsheet Row Responses   [facility] patient discharged from? [facility]   Does the patient have one of the following disease processes/diagnoses(primary or secondary)? Other   TCM attempt successful? Yes   Call start time 1311   Call end time 1320   Discharge diagnosis Alcohol withdrawal delirium   If primary language is not English what is the name and relationship or agency of  used? [facility]  ID #[id number]   Meds reviewed with patient/caregiver? Yes   Is the patient having any side effects they believe may be caused by any medication additions or changes? No   Does the patient have all medications ordered at discharge? Yes   Is the patient taking all medications as directed (includes completed medication regime)? Yes   Medication comments Started new meds today   Comments PCP appt on 7/16 @ 9:00 with FRANKIE Daniel   Does the patient have an appointment with their PCP within 7-14 days of discharge? Yes   Psychosocial issues? No   Did the patient receive a copy of their discharge instructions? Yes   Nursing interventions Reviewed instructions with patient   What is the patient's perception of their health status since discharge? Improving   Is the patient/caregiver able to teach back signs and symptoms related to disease process for when to call PCP? Yes   Is the patient/caregiver able to teach back signs and symptoms related to disease process for when to call 911? Yes   Is the patient/caregiver able to teach back the hierarchy of who to call/visit for symptoms/problems? PCP, Specialist, Home health nurse, Urgent Care, ED, 911 Yes   Additional teach back comments States he is doing well and started meds.  Has not been drinking alcohol.   TCM call completed? Yes   Wrap up additional comments No questions or needs at this time.   Call end time 1320            Milly CASAS - Licensed Nurse    7/11/2025, 13:22 EDT

## 2025-07-11 NOTE — PAYOR COMM NOTE
"NOTIFICATION OF DISCHARGE      AUTH # [id number]   Colt Montano (35 y.o. Male) 1990        DISCHARGED TO HOME ON 07/10/2025.      THANKS,                    Natasha Sandoval, RN MSN  /UR  [facility]  [phone] office  [phone] fax  [email or web address]    [facility]  NPI: [id number]  Tax: [phone]          Colt Montano (35 y.o. Male)       Date of Birth   1990    Social Security Number       Address   [address] [place] [address]    Home Phone   [phone]    MRN   [id number]       Latter day   None    Marital Status   Single                            Admission Date   7/6/2025    Admission Type   Urgent    Admitting Provider   Bryan De La Garza DO    Attending Provider       Department, Room/Bed   [facility] INTENSIVE CARE UNIT, 2308/1       Discharge Date   7/10/2025    Discharge Disposition   Home or Self Care    Discharge Destination                                 Attending Provider: (none)   Allergies: No Known Allergies    Isolation: None   Infection: None   Code Status: Prior    Ht: 165.1 cm (65\")   Wt: 83.9 kg (184 lb 15.5 oz)    Admission Cmt: None   Principal Problem: Alcohol withdrawal delirium [F10.931]                   Active Insurance as of 7/6/2025       Primary Coverage       Payor Plan Insurance Group Employer/Plan Group    [place]        Payor Plan Address Payor Plan Phone Number Payor Plan Fax Number Effective Dates    [address] [address]   1/1/2025 - None Entered    [place] [address]         Subscriber Name Subscriber Birth Date Member ID       COLT MONTANO 1990 [id number]                     Emergency Contacts        (Rel.) Home Phone Work Phone Mobile Phone    SANDRO MOLINA (Friend) -- -- [phone]    Guanako Gagnon (Friend) -- -- [phone]    BASIL SANDHU (Friend) -- -- [phone]    Tonny Isbell (Brother) -- -- --    Basil Sandhu (Friend) " "-- -- [phone]    Carmen Sales (Friend) -- -- [phone]    Vin Luciano (Mother) -- -- --                 Discharge Summary        Ulices Wood MD at 07/10/25 1401                       [facility] Medicine Services  Discharge Summary    Date of Service: 7/10/2025  Patient Name: Colt Luciano  : 1990  MRN: [id number]    Date of Admission: 2025  Discharge Diagnosis: Alcohol withdrawal delirium  Date of Discharge: 7/10/2025  Primary Care Physician: Joyce Shannon, RYAN      Presenting Problem:   Alcohol withdrawal delirium [F10.931]    Active and Resolved Hospital Problems:  Active Hospital Problems    Diagnosis POA    Transaminitis [R74.01] Yes    Alcohol dependence [F10.20] Yes      Resolved Hospital Problems    Diagnosis POA    **Alcohol withdrawal delirium [F10.931] Yes     Continue prednisolone 40 mg daily x 28 days total.  Continue lactulose and Xifaxan  Recommend alcohol rehab  Follow-up with GI in the office in 2 to 4 weeks  Recommending avoiding alcohol.  Will need repeat LFTs in the outpatient setting for continued monitoring.    GERD-famotidine twice daily.  Outpatient follow-up with GI.    Hospital Course     HPI:    \"Colt Luciano is a 35 y.o. male with PMH of alcohol abuse disorder presented to the hospital of an [facility], and was admitted with a principal diagnosis of Alcohol withdrawal delirium.  The following information has been obtained primarily from review of documentation and collaboration with the hospitalist as the patient is confused at the time of admission to the ICU.  [facility] of increasing tremors, anxiousness, and abdominal pain.  The patient reported that he usually drinks 8-12 beers per day however quit drinking about 4 days ago.  Friends and his boss noted that he was having increased shaking and brought him to the ED.  Findings in the ED ED were consistent with " "acute kidney injury and acute liver injury.  CT of the abdomen revealed hepatomegaly with steatosis and nodular changes consistent with evolving cirrhosis.  Transfer to [facility] was requested for higher level medical management as well as subspecialty availability.  The patient was initially excepted by the hospitalist for admission to the floor however the patient quickly developed fulminant alcohol withdrawal requiring large doses of Valium and physical restraints for his safety.  The patient was then transferred to the ICU for aggressive medical management and Precedex infusion to facilitate patient care.  The patient's is primarily [place]ayan-speaking and provided some information via electronic  \"    Hospital Course:  While in the hospital patient treated for alcohol withdrawal along with severe liver dysfunction.  Gastroenterology evaluated the patient of which recommending patient be discharged with prednisolone for 28 days total, rifaximin, lactulose, discontinuation of alcohol use.  Patient given the option for possible facility/rehabilitation of which he did not declined at this time would like to discharge home.  Patient stable no longer requiring CIWA protocol during hospitalization.  Patient educated significantly in [place] in regards to avoiding alcohol use.  Concern for GERD no patient was started on famotidine.    DISCHARGE Follow Up Recommendations for labs and diagnostics: LFT        Day of Discharge     Vital Signs:  Temp:  [97.7 °F (36.5 °C)-98.5 °F (36.9 °C)] 97.9 °F (36.6 °C)  Heart Rate:  [69-91] 78  Resp:  [15-21] 21  BP: ()/(56-90) 136/89    Physical Exam:  Physical Exam  Vitals and nursing note reviewed.   Constitutional:       General: He is not in acute distress.     Appearance: Normal appearance. He is normal weight. He is not ill-appearing or toxic-appearing.   HENT:      Head: Normocephalic and atraumatic.      Nose: Nose normal.      Mouth/Throat:      Mouth: " Mucous membranes are moist.      Pharynx: Oropharynx is clear.   Eyes:      General: No scleral icterus.     Extraocular Movements: Extraocular movements intact.      Conjunctiva/sclera: Conjunctivae normal.   Cardiovascular:      Rate and Rhythm: Normal rate and regular rhythm.      Pulses: Normal pulses.      Heart sounds: Normal heart sounds. No murmur heard.     No friction rub. No gallop.   Pulmonary:      Effort: Pulmonary effort is normal. No respiratory distress.      Breath sounds: Normal breath sounds. No wheezing, rhonchi or rales.   Abdominal:      General: Abdomen is flat. Bowel sounds are normal. There is no distension.      Palpations: Abdomen is soft.      Tenderness: There is no abdominal tenderness. There is no guarding.   Musculoskeletal:         General: Normal range of motion.      Cervical back: Normal range of motion. No rigidity or tenderness.      Right lower leg: No edema.      Left lower leg: No edema.   Skin:     General: Skin is warm.      Coloration: Skin is not jaundiced or pale.   Neurological:      General: No focal deficit present.      Mental Status: He is alert and oriented to person, place, and time. Mental status is at baseline.   Psychiatric:         Mood and Affect: Mood normal.         Behavior: Behavior normal.         Thought Content: Thought content normal.         Judgment: Judgment normal.            Pertinent  and/or Most Recent Results     LAB RESULTS:      Lab 07/10/25  0355 07/09/25  0510 07/08/25  0509 07/07/25  0522 07/06/25  1936 07/06/25  1404 07/06/25  0620   WBC 9.26 8.54 9.47 9.46  --   --  12.54*   HEMOGLOBIN 12.0* 12.0* 12.5* 12.1*  --   --  13.2   HEMATOCRIT 35.2* 34.8* 36.9* 35.6*  --   --  38.2   PLATELETS 134* 126* 112* 75*  --   --  66*   NEUTROS ABS 6.14 5.90 7.18* 7.55*  --   --  10.01*   IMMATURE GRANS (ABS) 0.07* 0.06* 0.04 0.04  --   --  0.09*   LYMPHS ABS 1.85 1.61 1.30 0.97  --   --  1.07   MONOS ABS 1.09* 0.80 0.91* 0.89  --   --  1.28*   EOS  ABS 0.09 0.13 0.02 0.00  --   --  0.06   MCV 94.9 95.1 95.3 94.2  --   --  93.4   PROCALCITONIN  --   --   --   --   --  0.95*  --    LACTATE  --   --   --   --  0.9  --   --    PROTIME 16.2* 15.0* 15.2* 15.7*  --   --  16.8*         Lab 07/10/25  0355 07/09/25  1629 07/09/25  0510 07/09/25  0210 07/08/25  1749 07/08/25  0509 07/07/25  0522 07/06/25  1936 07/06/25  1404   SODIUM 136  --  137  --   --  143 138  --  133*   POTASSIUM 3.5 3.9 3.2*  --  3.9 3.4* 3.6   < > 3.0*   CHLORIDE 105  --  107  --   --  114* 107  --  99   CO2 18.8*  --  17.6*  --   --  18.2* 18.3*  --  19.7*   ANION GAP 12.2  --  12.4  --   --  10.8 12.7  --  14.3   BUN 9.8  --  13.7  --   --  23.2* 28.3*  --  23.0*   CREATININE 0.51*  --  0.59*  --   --  0.69* 0.93  --  1.10   EGFR 137.3  --  131.4  --   --  125.3 111.2  --  90.9   GLUCOSE 112*  --  89  --   --  137* 84  --  85   CALCIUM 8.5*  --  8.4*  --   --  8.3* 8.2*  --  8.2*   MAGNESIUM 1.8  --  1.7  --   --  2.4 2.6  --  2.3   PHOSPHORUS 2.8 3.0 2.2* 1.9* 1.4* 2.0*  --   --  2.5    < > = values in this interval not displayed.         Lab 07/10/25  0355 07/09/25  0510 07/08/25  0509 07/07/25  0522 07/06/25  0620   TOTAL PROTEIN 6.5 6.2 6.5 6.3 7.2   ALBUMIN 3.1* 3.1* 3.2* 3.1* 3.4*   GLOBULIN 3.4 3.1 3.3 3.2 3.8   ALT (SGPT) 77* 75* 71* 56* 47*   AST (SGOT) 97* 99* 122* 135* 121*   BILIRUBIN 5.7* 6.7* 8.1* 12.6* 14.5*   ALK PHOS 206* 176* 193* 174* 213*   LIPASE 59  --   --   --  39         Lab 07/10/25  0355 07/09/25  0510 07/08/25  0509 07/07/25  0522 07/06/25  0620   PROTIME 16.2* 15.0* 15.2* 15.7* 16.8*   INR 1.30* 1.18* 1.20* 1.25* 1.36*                 Brief Urine Lab Results  (Last result in the past 365 days)        Color   Clarity   Blood   Leuk Est   Nitrite   Protein   CREAT   Urine HCG        07/06/25 1811 Orange  Comment: Any Substance that causes an abnormal urine color can alter the accuracy of the chemical reactions.   Cloudy   Large (3+)   Small (1+)   Positive   >=300  mg/dL (3+)                 Microbiology Results (last 10 days)       Procedure Component Value - Date/Time    Blood Culture - Blood, Arm, Left [[id number]]  (Normal) Collected: 07/06/25 1944    Lab Status: Preliminary result Specimen: Blood from Arm, Left Updated: 07/09/25 2001     Blood Culture No growth at 3 days    MRSA Screen, PCR (Inpatient) - Swab, Nares [[id number]]  (Normal) Collected: 07/06/25 1930    Lab Status: Final result Specimen: Swab from Nares Updated: 07/06/25 2131     MRSA PCR No MRSA Detected    Narrative:      The negative predictive value of this diagnostic test is high and should only be used to consider de-escalating anti-MRSA therapy. A positive result may indicate colonization with MRSA and must be correlated clinically.    Blood Culture - Blood, Arm, Right [[id number]]  (Normal) Collected: 07/06/25 1913    Lab Status: Preliminary result Specimen: Blood from Arm, Right Updated: 07/09/25 1932     Blood Culture No growth at 3 days    Narrative:      Less than seven (7) mL's of blood was collected.  Insufficient quantity may yield false negative results.    S. Pneumo Ag Urine or CSF - Urine, Urine, Catheter In/Out [[id number]]  (Normal) Collected: 07/06/25 1811    Lab Status: Final result Specimen: Urine, Catheter In/Out Updated: 07/06/25 1849     Strep Pneumo Ag Negative    Legionella Antigen, Urine - Urine, Urine, Catheter In/Out [[id number]]  (Normal) Collected: 07/06/25 1811    Lab Status: Final result Specimen: Urine, Catheter In/Out Updated: 07/06/25 1849     LEGIONELLA ANTIGEN, URINE Negative    Urine Culture - Urine, Urine, Clean Catch [[id number]]  (Normal) Collected: 07/06/25 1811    Lab Status: Final result Specimen: Urine, Clean Catch Updated: 07/08/25 0644     Urine Culture No growth            CT Head Without Contrast  Result Date: 7/6/2025  Impression: No acute intracranial abnormality. Electronically Signed: Rashaad Joel MD  7/6/2025 6:35 PM EDT  Workstation ID: [id number]    XR  Chest 1 View  Result Date: 7/6/2025  Impression: Impression: 1.Slightly elevated right hemidiaphragm. 2.Bandlike right perihilar and medial basilar scarring versus subsegmental atelectasis. Electronically Signed: Roderick Melton MD  7/6/2025 6:23 PM EDT  Workstation ID: [id number]    US Liver  Result Date: 7/6/2025  Impression: Impression: 1. Hepatomegaly with likely mild to moderate severity hepatic steatosis. 2. No sonographic evidence of hepatocellular carcinoma. 3. No visualized ascites. Hepatopetal flow of main portal vein. Electronically Signed: Trevor Marks MD  7/6/2025 4:25 PM EDT  Workstation ID: [id number]                  Labs Pending at Discharge:  Pending Results       Procedure [Order ID] Specimen - Date/Time    Potassium [[id number]]     Specimen: Blood             Procedures Performed           Consults:   Consults       Date and Time Order Name Status Description    7/9/2025  8:23 AM Inpatient Hospitalist Consult      7/8/2025  1:24 AM Inpatient Intensivist Consult      7/6/2025  5:54 AM Inpatient Psychiatrist Consult Completed     7/6/2025  5:41 AM Inpatient Gastroenterology Consult                Discharge Details        Discharge Medications        New Medications        Instructions Start Date   famotidine 20 MG tablet  Commonly known as: PEPCID   20 mg, Oral, 2 Times Daily Before Meals      lactulose 10 GM/15ML solution  Commonly known as: CHRONULAC   10 g, Oral, 3 Times Daily      prednisoLONE sodium phosphate 15 MG/5ML solution  Commonly known as: ORAPRED   40 mg, Oral, Daily   Start Date: July 11, 2025     riFAXIMin 550 MG tablet  Commonly known as: XIFAXAN   550 mg, Oral, Every 12 Hours Scheduled               No Known Allergies      Discharge Disposition:   Home or Self Care    Diet:  Hospital:  Diet Order   Procedures    Diet: High Protein Diet; Fluid Consistency: Thin (IDDSI 0)         Discharge Activity:         CODE STATUS:  Code Status and Medical Interventions: CPR (Attempt to  Resuscitate); Full Support   Ordered at: 07/06/25 0543     Code Status (Patient has no pulse and is not breathing):    CPR (Attempt to Resuscitate)     Medical Interventions (Patient has pulse or is breathing):    Full Support     Level Of Support Discussed With:    Patient         No future appointments.        Time spent on Discharge including face to face service:  35 minutes    Signature: Electronically signed by Severo Doewll MD, 07/10/25, 14:04 EDT.  [facility]      Electronically signed by Severo Dowell MD at 07/10/25 1404       Discharge Order (From admission, onward)       Start     Ordered    07/10/25 1359  Discharge patient  Once        Expected Discharge Date: 07/10/25   Discharge Disposition: Home or Self Care   Physician of Record for Attribution - Please select from Treatment Team: SEVERO DOWELL [[id number]]   Review needed by CMO to determine Physician of Record: No      Question Answer Comment   Physician of Record for Attribution - Please select from Treatment Team SEVERO DOWELL    Review needed by CMO to determine Physician of Record No        07/10/25 1400

## 2025-07-11 NOTE — CASE MANAGEMENT/SOCIAL WORK
Case Management Discharge Note      Final Note: Home         Selected Continued Care - Discharged on 7/10/2025 Admission date: 7/6/2025 - Discharge disposition: Home or Self Care       Transportation Services  Transportation: Private Transportation  Private: Car    Final Discharge Disposition Code: 01 - home or self-care

## 2025-07-16 ENCOUNTER — OFFICE VISIT (OUTPATIENT)
Dept: FAMILY MEDICINE CLINIC | Facility: CLINIC | Age: 35
End: 2025-07-16
Payer: MEDICAID

## 2025-07-16 ENCOUNTER — PATIENT ROUNDING (BHMG ONLY) (OUTPATIENT)
Dept: FAMILY MEDICINE CLINIC | Facility: CLINIC | Age: 35
End: 2025-07-16
Payer: MEDICAID

## 2025-07-16 VITALS
WEIGHT: 177 LBS | DIASTOLIC BLOOD PRESSURE: 84 MMHG | HEIGHT: 65 IN | TEMPERATURE: 97.5 F | HEART RATE: 80 BPM | SYSTOLIC BLOOD PRESSURE: 139 MMHG | OXYGEN SATURATION: 100 % | BODY MASS INDEX: 29.49 KG/M2

## 2025-07-16 DIAGNOSIS — Z76.89 ENCOUNTER TO ESTABLISH CARE: ICD-10-CM

## 2025-07-16 DIAGNOSIS — Z09 HOSPITAL DISCHARGE FOLLOW-UP: ICD-10-CM

## 2025-07-16 DIAGNOSIS — F10.931 ALCOHOL WITHDRAWAL DELIRIUM: Primary | ICD-10-CM

## 2025-07-16 DIAGNOSIS — K21.9 GASTROESOPHAGEAL REFLUX DISEASE, UNSPECIFIED WHETHER ESOPHAGITIS PRESENT: ICD-10-CM

## 2025-07-16 DIAGNOSIS — Z13.220 LIPID SCREENING: ICD-10-CM

## 2025-07-16 DIAGNOSIS — F10.29 ALCOHOL DEPENDENCE WITH UNSPECIFIED ALCOHOL-INDUCED DISORDER: ICD-10-CM

## 2025-07-16 RX ORDER — FAMOTIDINE 20 MG/1
20 TABLET, FILM COATED ORAL
Qty: 60 TABLET | Refills: 0 | Status: SHIPPED | OUTPATIENT
Start: 2025-07-16

## 2025-07-16 NOTE — PROGRESS NOTES
"Chief Complaint  Establish Care and Hospital Follow Up Visit    Subjective        Colt Montano presents to [facility]    History of Present Illness  The patient presents to [facility].and hospital follow up     He reports no specific health concerns at present. He is currently on a regimen of three medications, which he finds beneficial. His last medical consultation was in 2024, and he has not sought medical attention since then. He is not taking Dulcolax or folic acid. He requires refills for his medications. He is under the care of Dr. Joyce Barrientos for alcohol-related issues and is prescribed a 50 mg medication once daily. He abstains from alcohol. He was recently hospitalized on 07/10/2025 due to alcohol-related issues and stomach problems. He is also under the care of a gastroenterologist. He is not experiencing any current discomfort. He was prescribed prednisone during his hospital stay, which he takes from Sunday to Thursday.     Alcohol: The patient abstains from alcohol.    Results      History of Present Illness    Objective   Vital Signs:  /84 (BP Location: Right arm, Patient Position: Sitting, Cuff Size: Adult)   Pulse 80   Temp 97.5 °F (36.4 °C) (Infrared)   Ht 165.1 cm (65\")   Wt 80.3 kg (177 lb)   SpO2 100%   BMI 29.45 kg/m²   Estimated body mass index is 29.45 kg/m² as calculated from the following:    Height as of this encounter: 165.1 cm (65\").    Weight as of this encounter: 80.3 kg (177 lb).          Review of Systems   All other systems reviewed and are negative.       Physical Exam  Vitals reviewed.   Constitutional:       Appearance: Normal appearance. He is obese.   Eyes:      Comments: Patient does have some yellow discoloration to his eyes    Cardiovascular:      Rate and Rhythm: Normal rate and regular rhythm.      Pulses: Normal pulses.      Heart sounds: Normal heart sounds.   Pulmonary:      Effort: Pulmonary effort is normal.      " Breath sounds: Normal breath sounds.   Skin:     General: Skin is warm and dry.      Comments: Did not look jaundice    Neurological:      Mental Status: He is alert.   Psychiatric:         Mood and Affect: Mood normal.         Behavior: Behavior normal.         Thought Content: Thought content normal.         Judgment: Judgment normal.          Physical Exam  Respiratory: Clear to auscultation, no wheezing, rales or rhonchi  Cardiovascular: Regular rate and rhythm, no murmurs, rubs, or gallops    Result Review :                Assessment and Plan   Diagnoses and all orders for this visit:    1. Alcohol withdrawal delirium (Primary)    2. Gastroesophageal reflux disease, unspecified whether esophagitis present  -     famotidine (PEPCID) 20 MG tablet; Take 1 tablet by mouth 2 (Two) Times a Day Before Meals.  Dispense: 60 tablet; Refill: 0    3. Hospital discharge follow-up  Comments:  make appointment for annual physical   keep GI follow up    4. Alcohol dependence with unspecified alcohol-induced disorder    5. Encounter to establish care  Comments:  make appointment for annual physical             Assessment & Plan  1. Establishment of care.  - Blood pressure is slightly elevated today, which is unusual for the patient.  - Physical exam findings include elevated blood pressure.  - Advised to come fasting for the next visit to recheck abnormal blood work results from the hospital.  - Medications will be refilled and sent to the pharmacy.    2. Alcohol dependence.  - Patient reports being prescribed medication for alcohol dependence by Dr. Cook.  - Medication effectiveness is noted as working better.  - Discussion includes confirming the medication dosage (50 mg once daily) and ensuring Dr. Cook continues to prescribe it.  - No new medications or therapies prescribed for alcohol dependence today.    3. Constipation.  - Patient is not currently taking Dulcolax.  - No physical exam findings or test results  discussed.  - Assessment includes determining if Dulcolax is needed for constipation.  - No new medications or therapies prescribed for constipation today.    4. Gastrointestinal issues.  - Patient reports previous liver problems and jaundice.  - No physical exam findings or test results discussed.  - Assessment includes reviewing hospital records and setting up an appointment with a gastroenterologist. Looks like from hospital discharge he is to follow up with GI in 1-2 weeks  - No new medications or therapies prescribed for gastrointestinal issues today.      Seeing this patient there is a language barrier he understands some english but uses a translate via his cell phone to fully understand my questions      Follow Up   Return in about 1 week (around 7/23/2025) for Annual physical.  Patient was given instructions and counseling regarding his condition or for health maintenance advice. Please see specific information pulled into the AVS if appropriate.         This note has been electronically signed.   Mary Dobson PA-C 21:08 EDT 07/16/25   Patient or patient representative verbalized consent for the use of Ambient Listening during the visit with  Mary Dobson PA-C for chart documentation. 7/16/2025  21:12 EDT    The encounter note is created with the use of AI technology.  I do apologize if there are typos and/or confusion within the note.  Please feel free to contact me or my office with any questions or concerns.

## 2025-07-16 NOTE — PROGRESS NOTES
July 16, 2025    Hello, may I speak with Colt Montano?    My name is Felecia    I am  with [facility]  [address]  [address]  [place] [address].    Before we get started may I verify your date of birth? 1990    I am calling to officially welcome you to our practice and ask about your recent visit. Is this a good time to talk? yes    Tell me about your visit with us. What things went well?  It went well       We're always looking for ways to make our patients' experiences even better. Do you have recommendations on ways we may improve?  no    Overall were you satisfied with your first visit to our practice? yes       I appreciate you taking the time to speak with me today. Is there anything else I can do for you? no      Thank you, and have a great day.

## 2025-07-23 ENCOUNTER — READMISSION MANAGEMENT (OUTPATIENT)
Dept: CALL CENTER | Facility: HOSPITAL | Age: 35
End: 2025-07-23
Payer: MEDICAID

## 2025-07-23 NOTE — OUTREACH NOTE
Medical Week 2 Survey      Flowsheet Row Responses   [facility] patient discharged from? [facility]   Does the patient have one of the following disease processes/diagnoses(primary or secondary)? Other   Week 2 attempt successful? No   Unsuccessful attempts Attempt 1   oke Neptali Baker Registered Nurse

## 2025-07-24 ENCOUNTER — OFFICE VISIT (OUTPATIENT)
Dept: FAMILY MEDICINE CLINIC | Facility: CLINIC | Age: 35
End: 2025-07-24
Payer: MEDICAID

## 2025-07-24 VITALS
HEIGHT: 65 IN | DIASTOLIC BLOOD PRESSURE: 86 MMHG | SYSTOLIC BLOOD PRESSURE: 139 MMHG | BODY MASS INDEX: 29.32 KG/M2 | TEMPERATURE: 97.3 F | OXYGEN SATURATION: 97 % | WEIGHT: 176 LBS | HEART RATE: 74 BPM

## 2025-07-24 DIAGNOSIS — F10.29 ALCOHOL DEPENDENCE WITH UNSPECIFIED ALCOHOL-INDUCED DISORDER: Chronic | ICD-10-CM

## 2025-07-24 DIAGNOSIS — Z00.00 ANNUAL PHYSICAL EXAM: Primary | ICD-10-CM

## 2025-07-24 DIAGNOSIS — K59.00 CONSTIPATION, UNSPECIFIED CONSTIPATION TYPE: Chronic | ICD-10-CM

## 2025-07-24 DIAGNOSIS — K21.9 GASTROESOPHAGEAL REFLUX DISEASE, UNSPECIFIED WHETHER ESOPHAGITIS PRESENT: Chronic | ICD-10-CM

## 2025-07-24 DIAGNOSIS — F10.930 ALCOHOL WITHDRAWAL SYNDROME WITHOUT COMPLICATION: Chronic | ICD-10-CM

## 2025-07-24 RX ORDER — BACLOFEN 10 MG/1
TABLET ORAL
Qty: 158 TABLET | Refills: 0 | Status: SHIPPED | OUTPATIENT
Start: 2025-07-24 | End: 2025-08-23

## 2025-07-24 RX ORDER — FOLIC ACID 1 MG/1
1 TABLET ORAL DAILY
Qty: 30 TABLET | Refills: 0 | Status: SHIPPED | OUTPATIENT
Start: 2025-07-24

## 2025-07-24 RX ORDER — LACTULOSE 10 G/15ML
10 SOLUTION ORAL DAILY
Qty: 473 ML | Refills: 3 | Status: CANCELLED | OUTPATIENT
Start: 2025-07-24

## 2025-07-24 RX ORDER — GAUZE BANDAGE 2" X 2"
100 BANDAGE TOPICAL DAILY
Qty: 30 TABLET | Refills: 0 | Status: SHIPPED | OUTPATIENT
Start: 2025-07-24

## 2025-07-24 RX ORDER — POLYETHYLENE GLYCOL 3350 17 G/17G
17 POWDER, FOR SOLUTION ORAL DAILY PRN
Qty: 510 G | Refills: 0 | Status: SHIPPED | OUTPATIENT
Start: 2025-07-24

## 2025-07-24 RX ORDER — FAMOTIDINE 20 MG/1
20 TABLET, FILM COATED ORAL
Qty: 60 TABLET | Refills: 0 | Status: SHIPPED | OUTPATIENT
Start: 2025-07-24

## 2025-07-24 RX ORDER — FOLIC ACID 1 MG/1
1 TABLET ORAL DAILY
Qty: 30 TABLET | Refills: 0 | Status: CANCELLED | OUTPATIENT
Start: 2025-07-24

## 2025-07-24 RX ORDER — POLYETHYLENE GLYCOL 3350 17 G/17G
17 POWDER, FOR SOLUTION ORAL DAILY PRN
Qty: 510 G | Refills: 0 | Status: CANCELLED | OUTPATIENT
Start: 2025-07-24

## 2025-07-24 NOTE — PROGRESS NOTES
"Chief Complaint  Annual Exam    Subjective        Colt Montano presents to [facility]    History of Present Illness  The patient presents for annual physical and for evaluation of anxiety, and medication management.    He reports an improvement in his overall health, with the exception of a slight tremor due to his withdrawal from alcohol. He has not consumed any food or beverages today. He is not experiencing any ear-related issues, nasal sores, polyps, mouth sores, or dental problems. He also reports no muscle weakness or joint issues, maintaining full range of motion. His daily routine includes cycling for 20 minutes in the morning and evening. He is not experiencing any urinary symptoms, difficulty urinating, constipation, or diarrhea.   He is currently taking folic acid and lactulose, and requires refills for these medications. He has discontinued the use of Dulcolax and is taking MiraLAX daily. He is also on a course of prednisone and sodium phosphate, which he will continue until completion.    He was previously prescribed medication for anxiety last year, which he found effective. However, he has not taken it recently. He does not report any feelings of depression but admits to experiencing anxiety. He is not currently enrolled in a rehabilitation program for alcohol use but is considering joining one.    Social History:  Hobbies: Cycling  Alcohol: Considering joining a rehabilitation program    Results      History of Present Illness    Objective   Vital Signs:  /86 (BP Location: Right arm, Patient Position: Sitting, Cuff Size: Adult)   Pulse 74   Temp 97.3 °F (36.3 °C) (Infrared)   Ht 165.1 cm (65\")   Wt 79.8 kg (176 lb)   SpO2 97%   BMI 29.29 kg/m²   Estimated body mass index is 29.29 kg/m² as calculated from the following:    Height as of this encounter: 165.1 cm (65\").    Weight as of this encounter: 79.8 kg (176 lb).          Review of Systems "   Neurological:  Positive for tremors.   All other systems reviewed and are negative.       Physical Exam  Vitals reviewed.   Constitutional:       Appearance: Normal appearance. He is obese.      Comments: There is a mild language barrier patient does use a  roro to help him further understand my communication with him.   HENT:      Head: Normocephalic and atraumatic.      Right Ear: Tympanic membrane, ear canal and external ear normal.      Left Ear: Tympanic membrane, ear canal and external ear normal.      Nose: Nose normal.      Mouth/Throat:      Mouth: Mucous membranes are moist.      Pharynx: Oropharynx is clear.   Eyes:      Extraocular Movements: Extraocular movements intact.      Pupils: Pupils are equal, round, and reactive to light.      Comments: There is yellowing of his sclera both eyes    Cardiovascular:      Rate and Rhythm: Normal rate and regular rhythm.      Pulses: Normal pulses.      Heart sounds: Normal heart sounds.   Pulmonary:      Effort: Pulmonary effort is normal.      Breath sounds: Normal breath sounds.   Abdominal:      General: Abdomen is flat. Bowel sounds are normal.      Palpations: Abdomen is soft.   Genitourinary:     Comments: No physical exam was done but the patient was questioned as to whether or not he had any urinary symptoms or genital concerns  Musculoskeletal:         General: Normal range of motion.      Cervical back: Normal range of motion and neck supple.   Skin:     General: Skin is warm and dry.      Capillary Refill: Capillary refill takes less than 2 seconds.   Neurological:      General: No focal deficit present.      Mental Status: He is alert.   Psychiatric:         Mood and Affect: Mood normal.         Behavior: Behavior normal.         Thought Content: Thought content normal.         Judgment: Judgment normal.          Physical Exam  Ears: Bilateral ears appear normal.  Eyes: Pupils checked, no abnormalities noted except yellowing in the  sclera.  Nose: No nasal sores or polyps observed.  Mouth/Throat: Oral examination performed, no mouth sores or dental problems noted.  Neck: Neck examined, no abnormalities noted.  Gastrointestinal: Soft, no tenderness, no distention, no masses.  Musculoskeletal: Full range of motion noted in joints.    Result Review :                Assessment and Plan   Diagnoses and all orders for this visit:    1. Annual physical exam (Primary)  Comments:  Return in 1 month to recheck his anxiety and medications    2. Alcohol dependence with unspecified alcohol-induced disorder  -     folic acid (FOLVITE) 1 MG tablet; Take 1 tablet by mouth Daily.  Dispense: 30 tablet; Refill: 0  -     Thiamine Mononitrate 100 MG tablet; Take 1 tablet by mouth Daily.  Dispense: 30 tablet; Refill: 0    3. Gastroesophageal reflux disease, unspecified whether esophagitis present  -     famotidine (PEPCID) 20 MG tablet; Take 1 tablet by mouth 2 (Two) Times a Day Before Meals.  Dispense: 60 tablet; Refill: 0    4. Alcohol withdrawal syndrome without complication  -     baclofen (LIORESAL) 10 MG tablet; Take 0.5 tablets by mouth 3 (Three) Times a Day for 3 days, THEN 1 tablet 3 (Three) Times a Day for 3 days, THEN 2 tablets 3 (Three) Times a Day for 24 days.  Dispense: 158 tablet; Refill: 0    5. Constipation, unspecified constipation type  -     polyethylene glycol (MIRALAX) 17 GM/SCOOP powder; Take 17 g by mouth Daily As Needed (Use if senna-docusate is ineffective).  Dispense: 510 g; Refill: 0             Assessment & Plan  1. Anxiety.  - He reports that he was previously on medication for anxiety, which worked well for him.  - He is not currently experiencing significant anxiety but is considering rejoining a program for alcohol use.  - The patient's chart will be reviewed to confirm the appropriateness of restarting any medication we will him on baclofen to see if this helps with tremors    2. Constipation.  - He is currently taking MiraLAX daily  and requires a refill.  - He is also taking lactulose and needs a refill for this medication.  - He has discontinued Dulcolax and does not need a refill for it.  - Prescriptions for MiraLAX and lactulose will be sent to pharmacy.    3. Medication Management.  - He needs refills for folic acid and sodium phosphate.  - The patient's chart will be reviewed to confirm the long-term use of these medications.  - Prescriptions will be sent to pharmacy.  - Blood work will be conducted today to monitor his overall health status.    4. Health Maintenance.  - Blood work will be conducted today to monitor his overall health status.  - Physical exam findings are normal, including ear inspection, pupil check, and abdominal palpation.  - No significant muscle weakness or joint issues reported.  - No urinary symptoms, constipation, or diarrhea reported.    Follow-up: The patient will follow up in 1 month.      Follow Up   Return in about 1 month (around 8/24/2025) for Recheck Anxiety .  Patient was given instructions and counseling regarding his condition or for health maintenance advice. Please see specific information pulled into the AVS if appropriate.         This note has been electronically signed.   Mary Dobson PA-C 11:45 EDT 07/24/25   Patient or patient representative verbalized consent for the use of Ambient Listening during the visit with  Mary Dobson PA-C for chart documentation. 7/24/2025  11:45 EDT    The encounter note is created with the use of AI technology.  I do apologize if there are typos and/or confusion within the note.  Please feel free to contact me or my office with any questions or concerns.

## 2025-08-14 DIAGNOSIS — F10.930 ALCOHOL WITHDRAWAL SYNDROME WITHOUT COMPLICATION: Chronic | ICD-10-CM

## 2025-08-14 RX ORDER — BACLOFEN 10 MG/1
TABLET ORAL
Qty: 158 TABLET | Refills: 0 | Status: SHIPPED | OUTPATIENT
Start: 2025-08-14 | End: 2025-09-13

## 2025-08-28 ENCOUNTER — OFFICE VISIT (OUTPATIENT)
Dept: FAMILY MEDICINE CLINIC | Facility: CLINIC | Age: 35
End: 2025-08-28
Payer: MEDICAID

## 2025-08-28 VITALS
HEART RATE: 90 BPM | BODY MASS INDEX: 28.99 KG/M2 | DIASTOLIC BLOOD PRESSURE: 80 MMHG | HEIGHT: 65 IN | TEMPERATURE: 97.7 F | OXYGEN SATURATION: 99 % | SYSTOLIC BLOOD PRESSURE: 121 MMHG | WEIGHT: 174 LBS

## 2025-08-28 DIAGNOSIS — F41.9 ANXIETY: Primary | Chronic | ICD-10-CM

## 2025-08-28 DIAGNOSIS — F10.931 ALCOHOL WITHDRAWAL DELIRIUM: ICD-10-CM

## 2025-08-28 DIAGNOSIS — F10.29 ALCOHOL DEPENDENCE WITH UNSPECIFIED ALCOHOL-INDUCED DISORDER: ICD-10-CM

## 2025-08-28 DIAGNOSIS — L29.9 PRURITUS: ICD-10-CM

## 2025-08-28 RX ORDER — LANOLIN ALCOHOL/MO/W.PET/CERES
CREAM (GRAM) TOPICAL
COMMUNITY
Start: 2025-08-04

## 2025-08-29 LAB
ALBUMIN SERPL-MCNC: 4 G/DL (ref 4.1–5.1)
ALP SERPL-CCNC: 202 IU/L (ref 44–121)
ALT SERPL-CCNC: 24 IU/L (ref 0–44)
AST SERPL-CCNC: 26 IU/L (ref 0–40)
BASOPHILS # BLD AUTO: 0 X10E3/UL (ref 0–0.2)
BASOPHILS NFR BLD AUTO: 0 %
BILIRUB SERPL-MCNC: 1.7 MG/DL (ref 0–1.2)
BUN SERPL-MCNC: 7 MG/DL (ref 6–20)
BUN/CREAT SERPL: 12 (ref 9–20)
CALCIUM SERPL-MCNC: 9.4 MG/DL (ref 8.7–10.2)
CHLORIDE SERPL-SCNC: 102 MMOL/L (ref 96–106)
CO2 SERPL-SCNC: 22 MMOL/L (ref 20–29)
CREAT SERPL-MCNC: 0.59 MG/DL (ref 0.76–1.27)
EGFRCR SERPLBLD CKD-EPI 2021: 131 ML/MIN/1.73
EOSINOPHIL # BLD AUTO: 0.3 X10E3/UL (ref 0–0.4)
EOSINOPHIL NFR BLD AUTO: 2 %
ERYTHROCYTE [DISTWIDTH] IN BLOOD BY AUTOMATED COUNT: 11.8 % (ref 11.6–15.4)
GLOBULIN SER CALC-MCNC: 3.3 G/DL (ref 1.5–4.5)
GLUCOSE SERPL-MCNC: 102 MG/DL (ref 70–99)
HCT VFR BLD AUTO: 41.6 % (ref 37.5–51)
HGB BLD-MCNC: 13.2 G/DL (ref 13–17.7)
IMM GRANULOCYTES # BLD AUTO: 0.1 X10E3/UL (ref 0–0.1)
IMM GRANULOCYTES NFR BLD AUTO: 1 %
LYMPHOCYTES # BLD AUTO: 2.7 X10E3/UL (ref 0.7–3.1)
LYMPHOCYTES NFR BLD AUTO: 23 %
MCH RBC QN AUTO: 31.7 PG (ref 26.6–33)
MCHC RBC AUTO-ENTMCNC: 31.7 G/DL (ref 31.5–35.7)
MCV RBC AUTO: 100 FL (ref 79–97)
MONOCYTES # BLD AUTO: 0.9 X10E3/UL (ref 0.1–0.9)
MONOCYTES NFR BLD AUTO: 7 %
NEUTROPHILS # BLD AUTO: 8.1 X10E3/UL (ref 1.4–7)
NEUTROPHILS NFR BLD AUTO: 67 %
PLATELET # BLD AUTO: 196 X10E3/UL (ref 150–450)
POTASSIUM SERPL-SCNC: 3.8 MMOL/L (ref 3.5–5.2)
PROT SERPL-MCNC: 7.3 G/DL (ref 6–8.5)
RBC # BLD AUTO: 4.17 X10E6/UL (ref 4.14–5.8)
SODIUM SERPL-SCNC: 139 MMOL/L (ref 134–144)
WBC # BLD AUTO: 12.1 X10E3/UL (ref 3.4–10.8)